# Patient Record
Sex: FEMALE | Race: WHITE | Employment: OTHER
[De-identification: names, ages, dates, MRNs, and addresses within clinical notes are randomized per-mention and may not be internally consistent; named-entity substitution may affect disease eponyms.]

---

## 2017-01-16 ENCOUNTER — TELEPHONE (OUTPATIENT)
Dept: INTERNAL MEDICINE | Facility: CLINIC | Age: 62
End: 2017-01-16

## 2017-03-27 ENCOUNTER — TELEPHONE (OUTPATIENT)
Dept: INTERNAL MEDICINE CLINIC | Age: 62
End: 2017-03-27

## 2017-03-28 RX ORDER — LIRAGLUTIDE 6 MG/ML
1.8 INJECTION SUBCUTANEOUS DAILY
Qty: 5 PEN | Refills: 3 | Status: SHIPPED | OUTPATIENT
Start: 2017-03-28 | End: 2017-07-18 | Stop reason: SDUPTHER

## 2017-03-28 RX ORDER — LIRAGLUTIDE 6 MG/ML
1.8 INJECTION SUBCUTANEOUS DAILY
Qty: 5 PEN | Refills: 3 | Status: SHIPPED | OUTPATIENT
Start: 2017-03-28 | End: 2017-03-28 | Stop reason: SDUPTHER

## 2017-04-28 ENCOUNTER — TELEPHONE (OUTPATIENT)
Dept: INTERNAL MEDICINE CLINIC | Age: 62
End: 2017-04-28

## 2017-04-28 DIAGNOSIS — E11.00 TYPE 2 DIABETES MELLITUS WITH HYPEROSMOLARITY WITHOUT COMA, UNSPECIFIED LONG TERM INSULIN USE STATUS: Primary | ICD-10-CM

## 2017-05-12 ENCOUNTER — HOSPITAL ENCOUNTER (OUTPATIENT)
Age: 62
Discharge: HOME | End: 2017-05-12
Payer: MEDICARE

## 2017-05-12 DIAGNOSIS — E11.00 TYPE 2 DIABETES MELLITUS WITH HYPEROSMOLARITY WITHOUT COMA, UNSPECIFIED LONG TERM INSULIN USE STATUS: ICD-10-CM

## 2017-05-12 LAB
ABSOLUTE EOS #: 0.1 K/UL (ref 0–0.4)
ABSOLUTE LYMPH #: 1.7 K/UL (ref 1–4.8)
ABSOLUTE MONO #: 0.4 K/UL (ref 0.1–1.2)
ALBUMIN SERPL-MCNC: 4.3 G/DL (ref 3.5–5.2)
ALBUMIN/GLOBULIN RATIO: 1.3 (ref 1–2.5)
ALP BLD-CCNC: 63 U/L (ref 35–104)
ALT SERPL-CCNC: 28 U/L (ref 5–33)
ANION GAP SERPL CALCULATED.3IONS-SCNC: 14 MMOL/L (ref 9–17)
AST SERPL-CCNC: 28 U/L
BASOPHILS # BLD: 0 %
BASOPHILS ABSOLUTE: 0 K/UL (ref 0–0.2)
BILIRUB SERPL-MCNC: 0.75 MG/DL (ref 0.3–1.2)
BUN BLDV-MCNC: 14 MG/DL (ref 8–23)
BUN/CREAT BLD: ABNORMAL (ref 9–20)
CALCIUM SERPL-MCNC: 9.8 MG/DL (ref 8.6–10.4)
CHLORIDE BLD-SCNC: 103 MMOL/L (ref 98–107)
CHOLESTEROL, FASTING: 169 MG/DL
CHOLESTEROL/HDL RATIO: 4.4
CO2: 26 MMOL/L (ref 20–31)
CREAT SERPL-MCNC: 0.72 MG/DL (ref 0.5–0.9)
CREATININE URINE: 213.7 MG/DL (ref 28–217)
DIFFERENTIAL TYPE: NORMAL
EOSINOPHILS RELATIVE PERCENT: 2 %
ESTIMATED AVERAGE GLUCOSE: 114 MG/DL
GFR AFRICAN AMERICAN: >60 ML/MIN
GFR NON-AFRICAN AMERICAN: >60 ML/MIN
GFR SERPL CREATININE-BSD FRML MDRD: ABNORMAL ML/MIN/{1.73_M2}
GFR SERPL CREATININE-BSD FRML MDRD: ABNORMAL ML/MIN/{1.73_M2}
GLUCOSE BLD-MCNC: 103 MG/DL (ref 70–99)
HBA1C MFR BLD: 5.6 % (ref 4–6)
HCT VFR BLD CALC: 43.5 % (ref 36–46)
HDLC SERPL-MCNC: 38 MG/DL
HEMOGLOBIN: 14.9 G/DL (ref 12–16)
LDL CHOLESTEROL: 114 MG/DL (ref 0–130)
LYMPHOCYTES # BLD: 29 %
MCH RBC QN AUTO: 29.4 PG (ref 26–34)
MCHC RBC AUTO-ENTMCNC: 34.3 G/DL (ref 31–37)
MCV RBC AUTO: 85.5 FL (ref 80–100)
MICROALBUMIN/CREAT 24H UR: <12 MG/L
MICROALBUMIN/CREAT UR-RTO: 6 MCG/MG CREAT
MONOCYTES # BLD: 7 %
PDW BLD-RTO: 13.3 % (ref 12.5–15.4)
PLATELET # BLD: 287 K/UL (ref 140–450)
PLATELET ESTIMATE: NORMAL
PMV BLD AUTO: 8.3 FL (ref 6–12)
POTASSIUM SERPL-SCNC: 5.5 MMOL/L (ref 3.7–5.3)
RBC # BLD: 5.09 M/UL (ref 4–5.2)
RBC # BLD: NORMAL 10*6/UL
SEG NEUTROPHILS: 62 %
SEGMENTED NEUTROPHILS ABSOLUTE COUNT: 3.7 K/UL (ref 1.8–7.7)
SODIUM BLD-SCNC: 143 MMOL/L (ref 135–144)
TOTAL PROTEIN: 7.5 G/DL (ref 6.4–8.3)
TRIGLYCERIDE, FASTING: 84 MG/DL
TSH SERPL DL<=0.05 MIU/L-ACNC: 1.25 MIU/L (ref 0.3–5)
VLDLC SERPL CALC-MCNC: ABNORMAL MG/DL (ref 1–30)
WBC # BLD: 6 K/UL (ref 3.5–11)
WBC # BLD: NORMAL 10*3/UL

## 2017-05-12 PROCEDURE — 84443 ASSAY THYROID STIM HORMONE: CPT

## 2017-05-12 PROCEDURE — 82570 ASSAY OF URINE CREATININE: CPT

## 2017-05-12 PROCEDURE — 80061 LIPID PANEL: CPT

## 2017-05-12 PROCEDURE — 85025 COMPLETE CBC W/AUTO DIFF WBC: CPT

## 2017-05-12 PROCEDURE — 82043 UR ALBUMIN QUANTITATIVE: CPT

## 2017-05-12 PROCEDURE — 83036 HEMOGLOBIN GLYCOSYLATED A1C: CPT

## 2017-05-12 PROCEDURE — 80053 COMPREHEN METABOLIC PANEL: CPT

## 2017-05-12 PROCEDURE — 36415 COLL VENOUS BLD VENIPUNCTURE: CPT

## 2017-05-24 ENCOUNTER — OFFICE VISIT (OUTPATIENT)
Dept: INTERNAL MEDICINE CLINIC | Age: 62
End: 2017-05-24
Payer: MEDICARE

## 2017-05-24 VITALS
HEIGHT: 65 IN | RESPIRATION RATE: 14 BRPM | SYSTOLIC BLOOD PRESSURE: 122 MMHG | BODY MASS INDEX: 26.33 KG/M2 | DIASTOLIC BLOOD PRESSURE: 70 MMHG | WEIGHT: 158 LBS

## 2017-05-24 DIAGNOSIS — K21.9 GASTROESOPHAGEAL REFLUX DISEASE WITHOUT ESOPHAGITIS: Primary | ICD-10-CM

## 2017-05-24 DIAGNOSIS — E11.9 TYPE 2 DIABETES MELLITUS WITHOUT COMPLICATION, WITHOUT LONG-TERM CURRENT USE OF INSULIN (HCC): ICD-10-CM

## 2017-05-24 PROCEDURE — 99214 OFFICE O/P EST MOD 30 MIN: CPT | Performed by: INTERNAL MEDICINE

## 2017-05-24 ASSESSMENT — ENCOUNTER SYMPTOMS
NAUSEA: 0
TROUBLE SWALLOWING: 0
EYE DISCHARGE: 0
EYE PAIN: 0
VOMITING: 0
COLOR CHANGE: 0
SHORTNESS OF BREATH: 0
COUGH: 0
BLURRED VISION: 0
DIARRHEA: 0
VISUAL CHANGE: 0
BACK PAIN: 0
ABDOMINAL PAIN: 0
CHEST TIGHTNESS: 0
SORE THROAT: 0
CONSTIPATION: 0

## 2017-07-18 ENCOUNTER — OFFICE VISIT (OUTPATIENT)
Dept: PRIMARY CARE CLINIC | Age: 62
End: 2017-07-18
Payer: MEDICARE

## 2017-07-18 VITALS
RESPIRATION RATE: 14 BRPM | HEIGHT: 64 IN | DIASTOLIC BLOOD PRESSURE: 68 MMHG | BODY MASS INDEX: 25.61 KG/M2 | SYSTOLIC BLOOD PRESSURE: 120 MMHG | HEART RATE: 54 BPM | WEIGHT: 150 LBS

## 2017-07-18 DIAGNOSIS — I25.10 CORONARY ARTERY DISEASE INVOLVING NATIVE CORONARY ARTERY OF NATIVE HEART WITHOUT ANGINA PECTORIS: ICD-10-CM

## 2017-07-18 DIAGNOSIS — J02.0 ACUTE STREPTOCOCCAL PHARYNGITIS: ICD-10-CM

## 2017-07-18 DIAGNOSIS — R05.9 COUGH: ICD-10-CM

## 2017-07-18 DIAGNOSIS — M79.7 FIBROMYALGIA: ICD-10-CM

## 2017-07-18 DIAGNOSIS — I48.91 ATRIAL FIBRILLATION, UNSPECIFIED TYPE (HCC): ICD-10-CM

## 2017-07-18 DIAGNOSIS — I10 ESSENTIAL HYPERTENSION: ICD-10-CM

## 2017-07-18 DIAGNOSIS — K21.9 GASTROESOPHAGEAL REFLUX DISEASE WITHOUT ESOPHAGITIS: ICD-10-CM

## 2017-07-18 DIAGNOSIS — E11.9 TYPE 2 DIABETES MELLITUS WITHOUT COMPLICATION, WITHOUT LONG-TERM CURRENT USE OF INSULIN (HCC): Primary | ICD-10-CM

## 2017-07-18 DIAGNOSIS — J44.9 CHRONIC OBSTRUCTIVE PULMONARY DISEASE, UNSPECIFIED COPD TYPE (HCC): ICD-10-CM

## 2017-07-18 DIAGNOSIS — E03.9 ACQUIRED HYPOTHYROIDISM: ICD-10-CM

## 2017-07-18 PROCEDURE — 99214 OFFICE O/P EST MOD 30 MIN: CPT | Performed by: INTERNAL MEDICINE

## 2017-07-18 RX ORDER — LIRAGLUTIDE 6 MG/ML
1.8 INJECTION SUBCUTANEOUS DAILY
Qty: 5 PEN | Refills: 3 | Status: SHIPPED | OUTPATIENT
Start: 2017-07-18 | End: 2018-03-19 | Stop reason: ALTCHOICE

## 2017-07-18 ASSESSMENT — ENCOUNTER SYMPTOMS
VOMITING: 0
BLOOD IN STOOL: 0
ABDOMINAL PAIN: 0
SHORTNESS OF BREATH: 0
COUGH: 0
HEARTBURN: 0
NAUSEA: 0

## 2017-08-23 ENCOUNTER — HOSPITAL ENCOUNTER (OUTPATIENT)
Age: 62
Discharge: HOME | End: 2017-08-23
Payer: MEDICARE

## 2017-08-23 ENCOUNTER — HOSPITAL ENCOUNTER (OUTPATIENT)
Dept: GENERAL RADIOLOGY | Age: 62
Discharge: HOME | End: 2017-08-23
Payer: MEDICARE

## 2017-08-23 ENCOUNTER — PATIENT MESSAGE (OUTPATIENT)
Dept: PRIMARY CARE CLINIC | Age: 62
End: 2017-08-23

## 2017-08-23 DIAGNOSIS — R05.9 COUGH: ICD-10-CM

## 2017-08-23 PROCEDURE — 71020 XR CHEST STANDARD TWO VW: CPT

## 2017-09-20 DIAGNOSIS — Z12.11 SCREEN FOR COLON CANCER: Primary | ICD-10-CM

## 2017-09-20 LAB
CONTROL: PRESENT
HEMOCCULT STL QL: NEGATIVE

## 2017-09-20 PROCEDURE — 82274 ASSAY TEST FOR BLOOD FECAL: CPT | Performed by: INTERNAL MEDICINE

## 2017-09-21 ENCOUNTER — TELEPHONE (OUTPATIENT)
Dept: PRIMARY CARE CLINIC | Age: 62
End: 2017-09-21

## 2018-02-23 ENCOUNTER — HOSPITAL ENCOUNTER (EMERGENCY)
Age: 63
Discharge: HOME | End: 2018-02-23
Attending: EMERGENCY MEDICINE
Payer: MEDICARE

## 2018-02-23 VITALS
BODY MASS INDEX: 23.3 KG/M2 | TEMPERATURE: 98.1 F | WEIGHT: 145 LBS | OXYGEN SATURATION: 98 % | HEART RATE: 80 BPM | SYSTOLIC BLOOD PRESSURE: 151 MMHG | HEIGHT: 66 IN | DIASTOLIC BLOOD PRESSURE: 88 MMHG | RESPIRATION RATE: 18 BRPM

## 2018-02-23 DIAGNOSIS — J06.9 ACUTE UPPER RESPIRATORY INFECTION: ICD-10-CM

## 2018-02-23 DIAGNOSIS — J02.0 STREPTOCOCCAL SORE THROAT: Primary | ICD-10-CM

## 2018-02-23 PROCEDURE — 6370000000 HC RX 637 (ALT 250 FOR IP): Performed by: EMERGENCY MEDICINE

## 2018-02-23 PROCEDURE — 99283 EMERGENCY DEPT VISIT LOW MDM: CPT

## 2018-02-23 RX ORDER — BENZONATATE 100 MG/1
100 CAPSULE ORAL 3 TIMES DAILY PRN
Qty: 30 CAPSULE | Refills: 0 | Status: SHIPPED | OUTPATIENT
Start: 2018-02-23 | End: 2018-03-02

## 2018-02-23 RX ORDER — AZITHROMYCIN 250 MG/1
500 TABLET, FILM COATED ORAL ONCE
Status: COMPLETED | OUTPATIENT
Start: 2018-02-23 | End: 2018-02-23

## 2018-02-23 RX ORDER — AZITHROMYCIN 250 MG/1
TABLET, FILM COATED ORAL
Qty: 1 PACKET | Refills: 0 | Status: SHIPPED | OUTPATIENT
Start: 2018-02-23 | End: 2018-03-05

## 2018-02-23 RX ADMIN — AZITHROMYCIN 500 MG: 250 TABLET, FILM COATED ORAL at 21:11

## 2018-02-23 ASSESSMENT — ENCOUNTER SYMPTOMS
COUGH: 1
RHINORRHEA: 1
EYE DISCHARGE: 0
SORE THROAT: 1
BACK PAIN: 0
NAUSEA: 0
DIARRHEA: 0
VOMITING: 0
SHORTNESS OF BREATH: 0
EYE REDNESS: 0
EYE PAIN: 0
ABDOMINAL PAIN: 0

## 2018-02-23 ASSESSMENT — PAIN SCALES - GENERAL: PAINLEVEL_OUTOF10: 7

## 2018-02-23 ASSESSMENT — PAIN DESCRIPTION - LOCATION: LOCATION: NECK;THROAT

## 2018-02-24 NOTE — ED PROVIDER NOTES
[facility] ED  [address] [place] [address]  Phone: [phone]  Fax: [phone]    eMERGENCY dEPARTMENT eNCOUnter          Pt Name: Hortensia Gamboa  MRN: [id number]  [place] 1955  Date of evaluation: 2/23/2018      CHIEF COMPLAINT       Chief Complaint   Patient presents with    Cough     going on all wee long    Pharyngitis       HISTORY OF PRESENT ILLNESS              Hortensia Gamboa is a 57 y.o. female who presents with a prescription for infection symptoms and sore throat for the past 4 days. Has some generalized body aches as well. Denies any fevers. No significant headache. Also reports some laryngitis. Nothing seems to make the symptoms better or worse. States her pharyngitis is the worst of her symptoms. Denies other concerns. REVIEW OF SYSTEMS         Review of Systems   Constitutional: Negative for chills, fatigue and fever. HENT: Positive for congestion, rhinorrhea and sore throat. Negative for ear discharge and ear pain. Eyes: Negative for pain, discharge and redness. Respiratory: Positive for cough. Negative for shortness of breath. Cardiovascular: Negative for chest pain. Gastrointestinal: Negative for abdominal pain, diarrhea, nausea and vomiting. Genitourinary: Negative for difficulty urinating. Musculoskeletal: Negative for back pain and neck pain. Skin: Negative for rash. Neurological: Negative for weakness and headaches. All other systems reviewed and are negative. PAST MEDICAL HISTORY    has a past medical history of DM2 (diabetes mellitus, type 2) ([address] Fibromyalgia; GERD (gastroesophageal reflux disease); and HTN (hypertension). SURGICAL HISTORY      has a past surgical history that includes Carpal tunnel release; Ulnar tunnel release (Bilateral); and Tubal ligation.     CURRENT MEDICATIONS       Previous Medications    INSULIN PEN NEEDLE 31G X 8 MM MISC    1 each by Does not apply route daily    VICTOZA 18 present. No meningeal signs. Cardiovascular: Normal rate, regular rhythm, normal heart sounds and intact distal pulses. Pulmonary/Chest: Effort normal and breath sounds normal. No stridor. No respiratory distress. She has no wheezes. She has no rales. Abdominal: Soft. Bowel sounds are normal. There is no tenderness. Neurological: She is alert. GCS score is 15. Skin: Skin is warm and dry. No rash noted. She is not diaphoretic. No erythema. Vitals reviewed. DIFFERENTIAL DIAGNOSIS/ MDM:     Plan at this time will be to put the patient on azithromycin. Lungs are clear bilaterally. Clinically she is nontoxic-appearing. No evidence of meningitis. Advised to follow-up with the PCP or return right away if worse or for new or concerning symptoms. She is comfortable with this plan. I have reviewed the disposition diagnosis with the patient and or their family/guardian. I have answered their questions and given discharge instructions. They voiced understanding of these instructions and did not have any further questions or complaints. DIAGNOSTIC RESULTS     EKG: All EKG's are interpreted by the Emergency Department Physician who either signs or Co-signs this chart in the absence of a cardiologist.        RADIOLOGY:   I directly visualized the following  images and reviewed the radiologist interpretations:  No orders to display       No results found. LABS:  No results found for this visit on 02/23/18. EMERGENCY DEPARTMENT COURSE:     The patient was given the following medications:  Orders Placed This Encounter   Medications    azithromycin (ZITHROMAX) 250 MG tablet     Sig: Take 2 tablets (500 mg) on Day 1, followed by 1 tablet (250 mg) once daily on Days 2 through 5.      Dispense:  1 packet     Refill:  0    azithromycin (ZITHROMAX) tablet 500 mg    benzonatate (TESSALON PERLES) 100 MG capsule     Sig: Take 1 capsule by mouth 3 times daily as needed for Cough     Dispense:  30

## 2018-03-15 ENCOUNTER — HOSPITAL ENCOUNTER (OUTPATIENT)
Age: 63
Discharge: HOME | End: 2018-03-15
Payer: MEDICARE

## 2018-03-15 ENCOUNTER — TELEPHONE (OUTPATIENT)
Dept: PRIMARY CARE CLINIC | Age: 63
End: 2018-03-15

## 2018-03-15 DIAGNOSIS — Z13.29 SCREENING FOR THYROID DISORDER: ICD-10-CM

## 2018-03-15 DIAGNOSIS — E11.9 TYPE 2 DIABETES MELLITUS WITHOUT COMPLICATION, WITHOUT LONG-TERM CURRENT USE OF INSULIN (HCC): ICD-10-CM

## 2018-03-15 DIAGNOSIS — I10 ESSENTIAL HYPERTENSION: ICD-10-CM

## 2018-03-15 DIAGNOSIS — Z13.0 SCREENING FOR DEFICIENCY ANEMIA: ICD-10-CM

## 2018-03-15 DIAGNOSIS — Z13.220 SCREENING FOR HYPERLIPIDEMIA: ICD-10-CM

## 2018-03-15 DIAGNOSIS — E11.9 TYPE 2 DIABETES MELLITUS WITHOUT COMPLICATION, WITHOUT LONG-TERM CURRENT USE OF INSULIN (HCC): Primary | ICD-10-CM

## 2018-03-15 LAB
ALBUMIN SERPL-MCNC: 4.2 G/DL (ref 3.5–5.2)
ALBUMIN/GLOBULIN RATIO: 1.3 (ref 1–2.5)
ALP BLD-CCNC: 73 U/L (ref 35–104)
ALT SERPL-CCNC: 15 U/L (ref 5–33)
ANION GAP SERPL CALCULATED.3IONS-SCNC: 14 MMOL/L (ref 9–17)
AST SERPL-CCNC: 26 U/L
BILIRUB SERPL-MCNC: 0.78 MG/DL (ref 0.3–1.2)
BUN BLDV-MCNC: 14 MG/DL (ref 8–23)
BUN/CREAT BLD: ABNORMAL (ref 9–20)
CALCIUM SERPL-MCNC: 9.7 MG/DL (ref 8.6–10.4)
CHLORIDE BLD-SCNC: 102 MMOL/L (ref 98–107)
CHOLESTEROL/HDL RATIO: 3.9
CHOLESTEROL: 176 MG/DL
CO2: 26 MMOL/L (ref 20–31)
CREAT SERPL-MCNC: 0.62 MG/DL (ref 0.5–0.9)
ESTIMATED AVERAGE GLUCOSE: 128 MG/DL
GFR AFRICAN AMERICAN: >60 ML/MIN
GFR NON-AFRICAN AMERICAN: >60 ML/MIN
GFR SERPL CREATININE-BSD FRML MDRD: ABNORMAL ML/MIN/{1.73_M2}
GFR SERPL CREATININE-BSD FRML MDRD: ABNORMAL ML/MIN/{1.73_M2}
GLUCOSE BLD-MCNC: 100 MG/DL (ref 70–99)
HBA1C MFR BLD: 6.1 % (ref 4–6)
HCT VFR BLD CALC: 43.9 % (ref 36.3–47.1)
HDLC SERPL-MCNC: 45 MG/DL
HEMOGLOBIN: 14.4 G/DL (ref 11.9–15.1)
LDL CHOLESTEROL: 101 MG/DL (ref 0–130)
MCH RBC QN AUTO: 29.3 PG (ref 25.2–33.5)
MCHC RBC AUTO-ENTMCNC: 32.8 G/DL (ref 28.4–34.8)
MCV RBC AUTO: 89.4 FL (ref 82.6–102.9)
NRBC AUTOMATED: 0 PER 100 WBC
PDW BLD-RTO: 12.4 % (ref 11.8–14.4)
PLATELET # BLD: 290 K/UL (ref 138–453)
PMV BLD AUTO: 10.6 FL (ref 8.1–13.5)
POTASSIUM SERPL-SCNC: 5 MMOL/L (ref 3.7–5.3)
RBC # BLD: 4.91 M/UL (ref 3.95–5.11)
SODIUM BLD-SCNC: 142 MMOL/L (ref 135–144)
TOTAL PROTEIN: 7.4 G/DL (ref 6.4–8.3)
TRIGL SERPL-MCNC: 148 MG/DL
TSH SERPL DL<=0.05 MIU/L-ACNC: 1.62 MIU/L (ref 0.3–5)
VLDLC SERPL CALC-MCNC: NORMAL MG/DL (ref 1–30)
WBC # BLD: 5.9 K/UL (ref 3.5–11.3)

## 2018-03-15 PROCEDURE — 80053 COMPREHEN METABOLIC PANEL: CPT

## 2018-03-15 PROCEDURE — 84443 ASSAY THYROID STIM HORMONE: CPT

## 2018-03-15 PROCEDURE — 83036 HEMOGLOBIN GLYCOSYLATED A1C: CPT

## 2018-03-15 PROCEDURE — 80061 LIPID PANEL: CPT

## 2018-03-15 PROCEDURE — 36415 COLL VENOUS BLD VENIPUNCTURE: CPT

## 2018-03-15 PROCEDURE — 85027 COMPLETE CBC AUTOMATED: CPT

## 2018-03-19 ENCOUNTER — OFFICE VISIT (OUTPATIENT)
Dept: PRIMARY CARE CLINIC | Age: 63
End: 2018-03-19
Payer: MEDICARE

## 2018-03-19 VITALS
DIASTOLIC BLOOD PRESSURE: 74 MMHG | SYSTOLIC BLOOD PRESSURE: 122 MMHG | OXYGEN SATURATION: 97 % | HEIGHT: 66 IN | HEART RATE: 70 BPM | BODY MASS INDEX: 24.85 KG/M2 | RESPIRATION RATE: 17 BRPM | WEIGHT: 154.6 LBS

## 2018-03-19 DIAGNOSIS — R49.9 CHANGE IN VOICE: ICD-10-CM

## 2018-03-19 DIAGNOSIS — I10 ESSENTIAL HYPERTENSION: ICD-10-CM

## 2018-03-19 DIAGNOSIS — Z12.39 SCREENING FOR BREAST CANCER: ICD-10-CM

## 2018-03-19 DIAGNOSIS — E11.9 TYPE 2 DIABETES MELLITUS WITHOUT COMPLICATION, WITHOUT LONG-TERM CURRENT USE OF INSULIN (HCC): Primary | ICD-10-CM

## 2018-03-19 PROCEDURE — G8510 SCR DEP NEG, NO PLAN REQD: HCPCS | Performed by: NURSE PRACTITIONER

## 2018-03-19 PROCEDURE — 99214 OFFICE O/P EST MOD 30 MIN: CPT | Performed by: NURSE PRACTITIONER

## 2018-03-19 ASSESSMENT — PATIENT HEALTH QUESTIONNAIRE - PHQ9
SUM OF ALL RESPONSES TO PHQ QUESTIONS 1-9: 0
SUM OF ALL RESPONSES TO PHQ QUESTIONS 1-9: 0
1. LITTLE INTEREST OR PLEASURE IN DOING THINGS: 0
1. LITTLE INTEREST OR PLEASURE IN DOING THINGS: 0
2. FEELING DOWN, DEPRESSED OR HOPELESS: 0
2. FEELING DOWN, DEPRESSED OR HOPELESS: 0
SUM OF ALL RESPONSES TO PHQ9 QUESTIONS 1 & 2: 0
SUM OF ALL RESPONSES TO PHQ9 QUESTIONS 1 & 2: 0

## 2018-03-19 ASSESSMENT — ENCOUNTER SYMPTOMS
BLURRED VISION: 0
SHORTNESS OF BREATH: 0
ABDOMINAL PAIN: 0
BACK PAIN: 0
ORTHOPNEA: 0
VOICE CHANGE: 1
COUGH: 0

## 2018-03-19 NOTE — PROGRESS NOTES
3/19/2019) for annual exam.     1. DM- Stable. Hga1c 6.1. Not taking any meds. Continues to monitor blood sugar at home daily to monitor. 2. HTN- Stable. No meds. Continue to monitor at home. Follow up in one year for recheck. 3. Change in voice- Rx given for referral to ENT. Follow up as needed. Of the 25 minute duration appointment visit, Sotero ROTH-BC spent at least 50% of the face-to-face time in counseling, explanation of diagnosis, planning of further management, and answering all questions. Orders Placed This Encounter   Procedures    WES DIGITAL SCREEN W CAD BILATERAL     Standing Status:   Future     Standing Expiration Date:   3/19/2019     Order Specific Question:   Reason for exam:     Answer:   screening    External Referral To Ent     Referral Priority:   Routine     Referral Type:   Consult for Advice and Opinion     Referral Reason:   Specialty Services Required     Requested Specialty:   Otolaryngology     Number of Visits Requested:   1     Orders Placed This Encounter   Medications    glucose blood VI test strips (ASCENSIA AUTODISC VI;ONE TOUCH ULTRA TEST VI) strip     Si each by Other route daily Use with associated glucose meter. Dispense:  100 strip     Refill:  11       Patient given educational materials - see patient instructions. Discussed use, benefit, and side effects of prescribed medications. All patient questions answered. Pt voiced understanding. Reviewed health maintenance. Instructed to continue current medications, diet and exercise. Patient agreed with treatment plan. Follow up as directed.       Electronically signed by Jim Emanuel CNP on 3/19/2018 at 11:38 AM

## 2018-03-20 ENCOUNTER — HOSPITAL ENCOUNTER (OUTPATIENT)
Dept: MAMMOGRAPHY | Age: 63
Discharge: HOME | End: 2018-03-22
Payer: MEDICARE

## 2018-03-20 ENCOUNTER — TELEPHONE (OUTPATIENT)
Dept: PRIMARY CARE CLINIC | Age: 63
End: 2018-03-20

## 2018-03-20 DIAGNOSIS — Z12.39 SCREENING FOR BREAST CANCER: ICD-10-CM

## 2018-03-20 PROCEDURE — 77067 SCR MAMMO BI INCL CAD: CPT

## 2018-10-31 ENCOUNTER — OFFICE VISIT (OUTPATIENT)
Dept: PRIMARY CARE CLINIC | Age: 63
End: 2018-10-31
Payer: MEDICARE

## 2018-10-31 VITALS
BODY MASS INDEX: 25.82 KG/M2 | WEIGHT: 160 LBS | HEART RATE: 71 BPM | DIASTOLIC BLOOD PRESSURE: 76 MMHG | OXYGEN SATURATION: 96 % | SYSTOLIC BLOOD PRESSURE: 116 MMHG

## 2018-10-31 DIAGNOSIS — M72.0 DUPUYTREN CONTRACTURE: Primary | ICD-10-CM

## 2018-10-31 DIAGNOSIS — L91.8 SKIN TAG: ICD-10-CM

## 2018-10-31 PROCEDURE — 99214 OFFICE O/P EST MOD 30 MIN: CPT | Performed by: NURSE PRACTITIONER

## 2018-10-31 ASSESSMENT — ENCOUNTER SYMPTOMS
ABDOMINAL PAIN: 0
ROS SKIN COMMENTS: SKIN TAGS
SHORTNESS OF BREATH: 0
BACK PAIN: 0
COUGH: 0

## 2018-10-31 NOTE — PROGRESS NOTES
Prescriptions   Medication Sig Dispense Refill    glucose blood VI test strips (ASCENSIA AUTODISC VI;ONE TOUCH ULTRA TEST VI) strip 1 each by Other route daily Use with associated glucose meter. 100 strip 11     No current facility-administered medications for this visit. Allergies   Allergen Reactions    Codeine Nausea Only       Health Maintenance   Topic Date Due    Hepatitis C screen  1955    HIV screen  08/11/1968    DTaP/Tdap/Td vaccine (1 - Tdap) 08/11/1972    Shingles Vaccine (1 of 2 - 2 Dose Series) 08/11/2003    Low dose CT lung screening  08/11/2008    Cervical cancer screen  06/02/2017    Diabetic foot exam  07/18/2017    Diabetic retinal exam  01/03/2018    Diabetic microalbuminuria test  05/12/2018    Pneumococcal low/med risk (1 of 2 - PCV13) 08/11/2018    Flu vaccine (1) 09/01/2018    Colon Cancer Screen FIT/FOBT  09/19/2018    DEXA (modify frequency per FRAX score)  08/11/2018    A1C test (Diabetic or Prediabetic)  03/15/2019    Lipid screen  03/15/2019    Breast cancer screen  03/20/2020       Subjective:      Review of Systems   Constitutional: Negative for chills, fatigue and fever. HENT: Negative for congestion. Respiratory: Negative for cough and shortness of breath. Cardiovascular: Negative for chest pain and palpitations. Gastrointestinal: Negative for abdominal pain. Genitourinary: Negative for dysuria. Musculoskeletal: Negative for back pain. Contracture of 4th digit bilateral hands   Skin:        Skin tags   Neurological: Negative for dizziness and numbness. Psychiatric/Behavioral: Negative for self-injury, sleep disturbance and suicidal ideas. The patient is not nervous/anxious. Objective:     Physical Exam   Constitutional: She is oriented to person, place, and time. She appears well-developed and well-nourished. HENT:   Head: Normocephalic and atraumatic. Eyes: Pupils are equal, round, and reactive to light.    Neck: Normal health maintenance. Instructedto continue current medications, diet and exercise. Patient agreed with treatmentplan. Follow up as directed.      Electronicallysigned by DEISY Mittal CNP on 10/31/2018 at 2:54 PM

## 2018-11-14 ENCOUNTER — OFFICE VISIT (OUTPATIENT)
Dept: ORTHOPEDIC SURGERY | Age: 63
End: 2018-11-14
Payer: MEDICARE

## 2018-11-14 VITALS — BODY MASS INDEX: 25.71 KG/M2 | WEIGHT: 160 LBS | HEIGHT: 66 IN

## 2018-11-14 DIAGNOSIS — M20.011 ACQUIRED MALLET DEFORMITY OF RIGHT LITTLE FINGER: ICD-10-CM

## 2018-11-14 DIAGNOSIS — Z01.818 PRE-OP TESTING: ICD-10-CM

## 2018-11-14 DIAGNOSIS — M65.342 TRIGGER RING FINGER OF LEFT HAND: Primary | ICD-10-CM

## 2018-11-14 DIAGNOSIS — M65.341 TRIGGER RING FINGER OF RIGHT HAND: ICD-10-CM

## 2018-11-14 PROCEDURE — 99204 OFFICE O/P NEW MOD 45 MIN: CPT | Performed by: ORTHOPAEDIC SURGERY

## 2018-11-14 ASSESSMENT — ENCOUNTER SYMPTOMS
COUGH: 0
ABDOMINAL PAIN: 0
CHEST TIGHTNESS: 0
VOMITING: 0
APNEA: 0

## 2018-11-14 NOTE — PROGRESS NOTES
[facility]  [address] [place] [id number]  Dept: [phone]    Ambulatory Orthopedic Consult      CHIEF COMPLAINT:    Chief Complaint   Patient presents with    Dupytrens syndrome     bilateral       HISTORY OF PRESENT ILLNESS:      The patient is a 70 y.o. female who is being seen at the request of  DEISY Peng CNP for consultation and evaluation of bilateral ring finger pain. The patient states that for the last 3 months her bilateral ring fingers have been locking up and causing her pain. The patient states that she did not have any injuries to either hand. The patient is right hand dominant. The patient states that her pain is worse than at night and her fingers will lock up over night and she will have to force her fingers straight. The patient has finger deformity to her right pinky finger than happened approximately 7 years ago. The patient remembers getting her finger caught in her dogs collar but didn't think anything of it until years later when it never changed. The patient states that her finger bothers her when exercising as well as doing yoga. Past Medical History:    Past Medical History:   Diagnosis Date    [facility]2 (di[facility] mellitus, type 2) [facility]     Fibromyalgia     GERD (gastroesophageal reflux disease)     HTN (hypertension)        Past Surgical History:    Past Surgical History:   Procedure Laterality Date    CARPAL TUNNEL RELEASE      TUBAL LIGATION      ULNAR TUNNEL RELEASE Bilateral        Current Medications:   Current Outpatient Prescriptions   Medication Sig Dispense Refill    glucose blood VI test strips (ASCENSIA AUTODISC VI;ONE TOUCH ULTRA TEST VI) strip 1 each by Other route daily Use with associated glucose meter. 100 strip 11     No current facility-administered medications for this visit.         Allergies:    Codeine    Social History:   Social History     Social Palpable nodule in the region of the A1 pulley of the Bilateral ring fingers is noted. Active triggering of the ring fingers are noted. Motor, sensory, vascular examination to the Bilateral upper extremity is noted to be intact without deficits. Patient has full range of motion of the other fingers of the Bilateral hand without tenderness and has full range of motion of the wrist.    Right small finger flexion deformity at the DIP joint is appreciated with mild swelling and mild tenderness over the DIP. Finger can be passively extended to full extension. Active extension of the small finger at the DIP cannot be accomplished. No instability of the right small finger is noted. Motor, sensory, vascular examination the right upper extremity is grossly intact without focal deficits. Patient is able to make a tight composite fist on the right without difficulty. Radiology:     No results found. ASSESSMENT:     1. Trigger ring finger of left hand    2. Trigger ring finger of right hand    3. Pre-op testing    4. 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 The treatment options may include oral anti-inflammatories, bracing, injections, advanced imaging, activity modification, physical therapy and/or surgical intervention. Discussed risks/benifits of surgery with the patient today in the office. Discussed with the patient that after surgery you cannot lift anything heavier than a pen/fork. As for the mallet finger I discussed with the patient the surgical options for repairing the mallet finger. Would like to get an x-ray of the finger to confirm mallet deformity. Discussed sending the patient to a hand therapist for bracing. The patient would like to proceed with trigger finger release for the right ring finger. The patient will follow up in the office 2 weeks post operatively.   We discussed that the

## 2018-11-20 ENCOUNTER — TELEPHONE (OUTPATIENT)
Dept: PRIMARY CARE CLINIC | Age: 63
End: 2018-11-20

## 2018-11-20 DIAGNOSIS — Z12.11 SCREEN FOR COLON CANCER: Primary | ICD-10-CM

## 2018-11-20 DIAGNOSIS — Z13.820 SCREENING FOR OSTEOPOROSIS: ICD-10-CM

## 2018-11-21 NOTE — TELEPHONE ENCOUNTER
Labs were all done 3/2018, WNL  Repeat in 3/2019    Ordered dexa and fit  Will need to discuss/document information for low dose ct, I am assuming lung?     Thanks

## 2018-11-27 ENCOUNTER — HOSPITAL ENCOUNTER (OUTPATIENT)
Dept: PREADMISSION TESTING | Facility: CLINIC | Age: 63
Discharge: HOME | End: 2018-12-01
Payer: MEDICARE

## 2018-11-27 VITALS
DIASTOLIC BLOOD PRESSURE: 84 MMHG | HEART RATE: 59 BPM | BODY MASS INDEX: 24.91 KG/M2 | RESPIRATION RATE: 16 BRPM | WEIGHT: 155 LBS | HEIGHT: 66 IN | SYSTOLIC BLOOD PRESSURE: 135 MMHG | OXYGEN SATURATION: 95 %

## 2018-11-27 LAB
-: NORMAL
ALBUMIN SERPL-MCNC: 4.3 G/DL (ref 3.5–5.2)
ALBUMIN/GLOBULIN RATIO: 1.4 (ref 1–2.5)
ALP BLD-CCNC: 70 U/L (ref 35–104)
ALT SERPL-CCNC: 25 U/L (ref 5–33)
AMORPHOUS: NORMAL
ANION GAP SERPL CALCULATED.3IONS-SCNC: 11 MMOL/L (ref 9–17)
AST SERPL-CCNC: 28 U/L
BACTERIA: NORMAL
BILIRUB SERPL-MCNC: 0.64 MG/DL (ref 0.3–1.2)
BILIRUBIN URINE: NEGATIVE
BUN BLDV-MCNC: 11 MG/DL (ref 8–23)
BUN/CREAT BLD: ABNORMAL (ref 9–20)
CALCIUM SERPL-MCNC: 9.4 MG/DL (ref 8.6–10.4)
CASTS UA: NORMAL /LPF (ref 0–8)
CHLORIDE BLD-SCNC: 102 MMOL/L (ref 98–107)
CO2: 28 MMOL/L (ref 20–31)
COLOR: YELLOW
COMMENT UA: ABNORMAL
CREAT SERPL-MCNC: 0.64 MG/DL (ref 0.5–0.9)
CRYSTALS, UA: NORMAL /HPF
EKG ATRIAL RATE: 61 BPM
EKG P AXIS: 32 DEGREES
EKG P-R INTERVAL: 140 MS
EKG Q-T INTERVAL: 410 MS
EKG QRS DURATION: 66 MS
EKG QTC CALCULATION (BAZETT): 412 MS
EKG R AXIS: 36 DEGREES
EKG T AXIS: 38 DEGREES
EKG VENTRICULAR RATE: 61 BPM
EPITHELIAL CELLS UA: NORMAL /HPF (ref 0–5)
GFR AFRICAN AMERICAN: >60 ML/MIN
GFR NON-AFRICAN AMERICAN: >60 ML/MIN
GFR SERPL CREATININE-BSD FRML MDRD: ABNORMAL ML/MIN/{1.73_M2}
GFR SERPL CREATININE-BSD FRML MDRD: ABNORMAL ML/MIN/{1.73_M2}
GLUCOSE BLD-MCNC: 147 MG/DL (ref 70–99)
GLUCOSE URINE: NEGATIVE
HCT VFR BLD CALC: 43.4 % (ref 36.3–47.1)
HEMOGLOBIN: 14.2 G/DL (ref 11.9–15.1)
KETONES, URINE: NEGATIVE
LEUKOCYTE ESTERASE, URINE: ABNORMAL
MCH RBC QN AUTO: 29.8 PG (ref 25.2–33.5)
MCHC RBC AUTO-ENTMCNC: 32.7 G/DL (ref 28.4–34.8)
MCV RBC AUTO: 91 FL (ref 82.6–102.9)
MUCUS: NORMAL
NITRITE, URINE: NEGATIVE
NRBC AUTOMATED: 0 PER 100 WBC
OTHER OBSERVATIONS UA: NORMAL
PDW BLD-RTO: 12.4 % (ref 11.8–14.4)
PH UA: 6 (ref 5–8)
PLATELET # BLD: 263 K/UL (ref 138–453)
PMV BLD AUTO: 10.3 FL (ref 8.1–13.5)
POTASSIUM SERPL-SCNC: 4.5 MMOL/L (ref 3.7–5.3)
PROTEIN UA: NEGATIVE
RBC # BLD: 4.77 M/UL (ref 3.95–5.11)
RBC UA: NORMAL /HPF (ref 0–4)
RENAL EPITHELIAL, UA: NORMAL /HPF
SODIUM BLD-SCNC: 141 MMOL/L (ref 135–144)
SPECIFIC GRAVITY UA: 1.02 (ref 1–1.03)
TOTAL PROTEIN: 7.4 G/DL (ref 6.4–8.3)
TRICHOMONAS: NORMAL
TURBIDITY: CLEAR
URINE HGB: NEGATIVE
UROBILINOGEN, URINE: NORMAL
WBC # BLD: 6.9 K/UL (ref 3.5–11.3)
WBC UA: NORMAL /HPF (ref 0–5)
YEAST: NORMAL

## 2018-11-27 PROCEDURE — 81001 URINALYSIS AUTO W/SCOPE: CPT

## 2018-11-27 PROCEDURE — 80053 COMPREHEN METABOLIC PANEL: CPT

## 2018-11-27 PROCEDURE — 85027 COMPLETE CBC AUTOMATED: CPT

## 2018-11-27 PROCEDURE — 93005 ELECTROCARDIOGRAM TRACING: CPT

## 2018-11-27 NOTE — PROGRESS NOTES
DAY OF SURGERY/PROCEDURE  GUIDELINES    As a patient at the [facility] you can expect quality medical and nursing care that is centered on your individual needs. It is our goal to make your surgical experience as comfortable and excellent as possible.  ________________________________________________________________________    The following instructions are general guidelines, if any information on this sheet is different from what your doctor has instructed you to do, please follow your doctor's instructions. · Please arrive on time or your procedure may be rescheduled  · Enter through front entrance of the building. Surgery Center will be located to your right. · Upon arrival you will be taken to the pre-operative area to get ready for surgery, your family will stay in the waiting room and visit with you once you are ready for surgery. Due to special limitations please limit visitation to 1-2 members of your family at a time. When it is time for surgery your family will return to the waiting room. · You will be given a specific time when you will NOT be able to eat, drink, smoke, suck or chew ANYTHING (no water, gum, mints, cigarettes, cigars, pipes, snuff, chewing tobacco, etc.) or your surgery may be canceled. This will occur during your PAT appointment or by phone 1-2 days before surgery  · Take a shower or bath on the morning of your surgery/procedure (Hibiclens if directed)  · Brush your teeth, but do not swallow any water  · IN CASE OF ILLNESS - If you have a cold or flu symptoms (high fever, runny nose, sore throat, cough, etc.) rash, nausea, vomiting, loose stools, and/or recent contact with someone who has a contagious disease (chick pox, measles, etc.) please call your doctor before coming to the surgery center  · Take a small sip of water with heart, blood pressure, and/or seizure medication the morning of surgery.  (DO NOT take blood pressure medications that contain a

## 2018-11-29 ENCOUNTER — HOSPITAL ENCOUNTER (OUTPATIENT)
Dept: GENERAL RADIOLOGY | Age: 63
Discharge: HOME | End: 2018-12-01
Payer: MEDICARE

## 2018-11-29 ENCOUNTER — HOSPITAL ENCOUNTER (OUTPATIENT)
Age: 63
Discharge: HOME | End: 2018-12-01
Payer: MEDICARE

## 2018-11-29 DIAGNOSIS — Z01.818 PRE-OP TESTING: ICD-10-CM

## 2018-11-29 PROCEDURE — 71046 X-RAY EXAM CHEST 2 VIEWS: CPT

## 2018-12-07 ENCOUNTER — ANESTHESIA (OUTPATIENT)
Dept: OPERATING ROOM | Facility: CLINIC | Age: 63
End: 2018-12-07
Payer: MEDICARE

## 2018-12-07 ENCOUNTER — HOSPITAL ENCOUNTER (OUTPATIENT)
Facility: CLINIC | Age: 63
Setting detail: OUTPATIENT SURGERY
Discharge: HOME | End: 2018-12-07
Attending: ORTHOPAEDIC SURGERY | Admitting: ORTHOPAEDIC SURGERY
Payer: MEDICARE

## 2018-12-07 ENCOUNTER — ANESTHESIA EVENT (OUTPATIENT)
Dept: OPERATING ROOM | Facility: CLINIC | Age: 63
End: 2018-12-07
Payer: MEDICARE

## 2018-12-07 VITALS
DIASTOLIC BLOOD PRESSURE: 92 MMHG | OXYGEN SATURATION: 95 % | HEART RATE: 63 BPM | WEIGHT: 155 LBS | TEMPERATURE: 97.5 F | BODY MASS INDEX: 24.91 KG/M2 | RESPIRATION RATE: 20 BRPM | HEIGHT: 66 IN | SYSTOLIC BLOOD PRESSURE: 151 MMHG

## 2018-12-07 VITALS — DIASTOLIC BLOOD PRESSURE: 70 MMHG | SYSTOLIC BLOOD PRESSURE: 111 MMHG | OXYGEN SATURATION: 95 %

## 2018-12-07 DIAGNOSIS — G89.18 POST-OP PAIN: Primary | ICD-10-CM

## 2018-12-07 LAB — GLUCOSE BLD-MCNC: 88 MG/DL (ref 65–105)

## 2018-12-07 PROCEDURE — 3600000002 HC SURGERY LEVEL 2 BASE: Performed by: ORTHOPAEDIC SURGERY

## 2018-12-07 PROCEDURE — 6360000002 HC RX W HCPCS: Performed by: ANESTHESIOLOGY

## 2018-12-07 PROCEDURE — 3700000001 HC ADD 15 MINUTES (ANESTHESIA): Performed by: ORTHOPAEDIC SURGERY

## 2018-12-07 PROCEDURE — 6360000002 HC RX W HCPCS: Performed by: ORTHOPAEDIC SURGERY

## 2018-12-07 PROCEDURE — 2500000003 HC RX 250 WO HCPCS: Performed by: NURSE ANESTHETIST, CERTIFIED REGISTERED

## 2018-12-07 PROCEDURE — 2580000003 HC RX 258: Performed by: ANESTHESIOLOGY

## 2018-12-07 PROCEDURE — 3700000000 HC ANESTHESIA ATTENDED CARE: Performed by: ORTHOPAEDIC SURGERY

## 2018-12-07 PROCEDURE — 7100000010 HC PHASE II RECOVERY - FIRST 15 MIN: Performed by: ORTHOPAEDIC SURGERY

## 2018-12-07 PROCEDURE — 82947 ASSAY GLUCOSE BLOOD QUANT: CPT

## 2018-12-07 PROCEDURE — 7100000011 HC PHASE II RECOVERY - ADDTL 15 MIN: Performed by: ORTHOPAEDIC SURGERY

## 2018-12-07 PROCEDURE — 26055 INCISE FINGER TENDON SHEATH: CPT | Performed by: ORTHOPAEDIC SURGERY

## 2018-12-07 PROCEDURE — 3600000012 HC SURGERY LEVEL 2 ADDTL 15MIN: Performed by: ORTHOPAEDIC SURGERY

## 2018-12-07 PROCEDURE — 6360000002 HC RX W HCPCS: Performed by: NURSE ANESTHETIST, CERTIFIED REGISTERED

## 2018-12-07 RX ORDER — FENTANYL CITRATE 50 UG/ML
25 INJECTION, SOLUTION INTRAMUSCULAR; INTRAVENOUS EVERY 5 MIN PRN
Status: DISCONTINUED | OUTPATIENT
Start: 2018-12-07 | End: 2018-12-07 | Stop reason: HOSPADM

## 2018-12-07 RX ORDER — LIDOCAINE HYDROCHLORIDE 10 MG/ML
INJECTION, SOLUTION EPIDURAL; INFILTRATION; INTRACAUDAL; PERINEURAL PRN
Status: DISCONTINUED | OUTPATIENT
Start: 2018-12-07 | End: 2018-12-07 | Stop reason: SDUPTHER

## 2018-12-07 RX ORDER — PROPOFOL 10 MG/ML
INJECTION, EMULSION INTRAVENOUS PRN
Status: DISCONTINUED | OUTPATIENT
Start: 2018-12-07 | End: 2018-12-07 | Stop reason: SDUPTHER

## 2018-12-07 RX ORDER — ONDANSETRON 2 MG/ML
4 INJECTION INTRAMUSCULAR; INTRAVENOUS
Status: DISCONTINUED | OUTPATIENT
Start: 2018-12-07 | End: 2018-12-07 | Stop reason: HOSPADM

## 2018-12-07 RX ORDER — SODIUM CHLORIDE, SODIUM LACTATE, POTASSIUM CHLORIDE, CALCIUM CHLORIDE 600; 310; 30; 20 MG/100ML; MG/100ML; MG/100ML; MG/100ML
INJECTION, SOLUTION INTRAVENOUS CONTINUOUS
Status: DISCONTINUED | OUTPATIENT
Start: 2018-12-07 | End: 2018-12-07 | Stop reason: HOSPADM

## 2018-12-07 RX ORDER — HYDROCODONE BITARTRATE AND ACETAMINOPHEN 5; 325 MG/1; MG/1
1 TABLET ORAL EVERY 4 HOURS PRN
Qty: 18 TABLET | Refills: 0 | Status: SHIPPED | OUTPATIENT
Start: 2018-12-07 | End: 2018-12-10

## 2018-12-07 RX ORDER — PROPOFOL 10 MG/ML
INJECTION, EMULSION INTRAVENOUS CONTINUOUS PRN
Status: DISCONTINUED | OUTPATIENT
Start: 2018-12-07 | End: 2018-12-07 | Stop reason: SDUPTHER

## 2018-12-07 RX ORDER — MIDAZOLAM HYDROCHLORIDE 1 MG/ML
1 INJECTION INTRAMUSCULAR; INTRAVENOUS ONCE
Status: COMPLETED | OUTPATIENT
Start: 2018-12-07 | End: 2018-12-07

## 2018-12-07 RX ADMIN — LIDOCAINE HYDROCHLORIDE 50 MG: 10 INJECTION, SOLUTION EPIDURAL; INFILTRATION; INTRACAUDAL at 14:37

## 2018-12-07 RX ADMIN — PROPOFOL 80 MG: 10 INJECTION, EMULSION INTRAVENOUS at 14:37

## 2018-12-07 RX ADMIN — PROPOFOL 100 MCG/KG/MIN: 10 INJECTION, EMULSION INTRAVENOUS at 14:37

## 2018-12-07 RX ADMIN — SODIUM CHLORIDE, POTASSIUM CHLORIDE, SODIUM LACTATE AND CALCIUM CHLORIDE: 600; 310; 30; 20 INJECTION, SOLUTION INTRAVENOUS at 13:15

## 2018-12-07 RX ADMIN — Medication 2 G: at 14:36

## 2018-12-07 RX ADMIN — MIDAZOLAM HYDROCHLORIDE 1 MG: 1 INJECTION, SOLUTION INTRAMUSCULAR; INTRAVENOUS at 13:25

## 2018-12-07 ASSESSMENT — PULMONARY FUNCTION TESTS
PIF_VALUE: 1
PIF_VALUE: 1
PIF_VALUE: 0
PIF_VALUE: 0
PIF_VALUE: 1
PIF_VALUE: 0
PIF_VALUE: 0
PIF_VALUE: 1
PIF_VALUE: 0
PIF_VALUE: 1
PIF_VALUE: 0
PIF_VALUE: 1
PIF_VALUE: 0
PIF_VALUE: 1
PIF_VALUE: 0
PIF_VALUE: 0
PIF_VALUE: 1

## 2018-12-07 ASSESSMENT — PAIN - FUNCTIONAL ASSESSMENT: PAIN_FUNCTIONAL_ASSESSMENT: 0-10

## 2018-12-07 ASSESSMENT — ENCOUNTER SYMPTOMS
STRIDOR: 0
SHORTNESS OF BREATH: 0

## 2018-12-07 NOTE — ANESTHESIA PRE PROCEDURE
Department of Anesthesiology  Preprocedure Note       Name:  Jose Killian   Age:  70 y.o.  :  1955                                          MRN:  [id number]         Date:  2018      Surgeon: Pauly Menendez):  Skip Garg DO    Procedure: RIGHT RING FINGER TRIGGER RELEASE (N/A )    Medications prior to admission:   Prior to Admission medications    Medication Sig Start Date End Date Taking? Authorizing Provider   glucose blood VI test strips (ASCENSIA AUTODISC VI;ONE TOUCH ULTRA TEST VI) strip 1 each by Other route daily Use with associated glucose meter. 3/19/18   Joseph Hurley, APRN - CNP       Current medications:    No current facility-administered medications for this encounter. Allergies:     Allergies   Allergen Reactions    Codeine Nausea Only       Problem List:    Patient Active Problem List   Diagnosis Code    Type 2 diabetes mellitus without complication ([facility]) E78.8    Fibromyalgia M79.7    HTN (hypertension) I10    GERD (gastroesophageal reflux disease) K21.9    Low back pain M54.5    Dizziness R42    DM2 (diabetes mellitus, type 2) ([address] E11.9       Past Medical History:        Diagnosis Date    Diabetes mellitus ([address]     Fibromyalgia     GERD (gastroesophageal reflux disease)        Past Surgical History:        Procedure Laterality Date    CARPAL TUNNEL RELEASE      TUBAL LIGATION      ULNAR TUNNEL RELEASE Bilateral        Social History:    Social History   Substance Use Topics    Smoking status: Former Smoker     Packs/day: 1.50     Years: 30.00     Types: Cigarettes     Quit date: 2005    Smokeless tobacco: Never Used    Alcohol use No                                Counseling given: Not Answered      Vital Signs (Current):   Vitals:    18 1301   BP: (!) 146/93   Pulse: 62   Resp: 12   Temp: 97.2 °F (36.2 °C)   TempSrc: Temporal   SpO2: 95%   Weight: 155 lb (70.3 kg)   Height: 5' 6\" (1.676 m)                                              BP

## 2018-12-19 ENCOUNTER — OFFICE VISIT (OUTPATIENT)
Dept: ORTHOPEDIC SURGERY | Age: 63
End: 2018-12-19

## 2018-12-19 VITALS — HEIGHT: 66 IN | BODY MASS INDEX: 24.91 KG/M2 | WEIGHT: 154.98 LBS

## 2018-12-19 DIAGNOSIS — M65.341 TRIGGER RING FINGER OF RIGHT HAND: Primary | ICD-10-CM

## 2018-12-19 PROCEDURE — 99024 POSTOP FOLLOW-UP VISIT: CPT | Performed by: ORTHOPAEDIC SURGERY

## 2018-12-19 ASSESSMENT — ENCOUNTER SYMPTOMS
DIARRHEA: 0
NAUSEA: 0
COUGH: 0
CONSTIPATION: 0

## 2019-01-15 ENCOUNTER — TELEPHONE (OUTPATIENT)
Dept: PRIMARY CARE CLINIC | Age: 64
End: 2019-01-15

## 2019-03-14 ENCOUNTER — TELEPHONE (OUTPATIENT)
Dept: PRIMARY CARE CLINIC | Age: 64
End: 2019-03-14

## 2019-03-15 ENCOUNTER — TELEPHONE (OUTPATIENT)
Dept: PRIMARY CARE CLINIC | Age: 64
End: 2019-03-15

## 2019-03-15 DIAGNOSIS — R79.9 ABNORMAL FINDING OF BLOOD CHEMISTRY: ICD-10-CM

## 2019-03-15 DIAGNOSIS — Z13.1 SCREENING FOR DIABETES MELLITUS: ICD-10-CM

## 2019-03-15 DIAGNOSIS — Z13.29 SCREENING FOR THYROID DISORDER: ICD-10-CM

## 2019-03-15 DIAGNOSIS — Z13.220 SCREENING FOR LIPID DISORDERS: ICD-10-CM

## 2019-03-15 DIAGNOSIS — Z13.0 SCREENING FOR DEFICIENCY ANEMIA: Primary | ICD-10-CM

## 2019-03-19 ENCOUNTER — HOSPITAL ENCOUNTER (OUTPATIENT)
Age: 64
Discharge: HOME | End: 2019-03-19
Payer: MEDICARE

## 2019-03-19 DIAGNOSIS — Z13.220 SCREENING FOR LIPID DISORDERS: ICD-10-CM

## 2019-03-19 DIAGNOSIS — Z13.1 SCREENING FOR DIABETES MELLITUS: ICD-10-CM

## 2019-03-19 DIAGNOSIS — Z01.818 PRE-OP TESTING: ICD-10-CM

## 2019-03-19 DIAGNOSIS — R79.9 ABNORMAL FINDING OF BLOOD CHEMISTRY: ICD-10-CM

## 2019-03-19 DIAGNOSIS — Z13.29 SCREENING FOR THYROID DISORDER: ICD-10-CM

## 2019-03-19 LAB
ALBUMIN SERPL-MCNC: 4.3 G/DL (ref 3.5–5.2)
ALBUMIN/GLOBULIN RATIO: 1.5 (ref 1–2.5)
ALP BLD-CCNC: 73 U/L (ref 35–104)
ALT SERPL-CCNC: 18 U/L (ref 5–33)
ANION GAP SERPL CALCULATED.3IONS-SCNC: 17 MMOL/L (ref 9–17)
AST SERPL-CCNC: 22 U/L
BILIRUB SERPL-MCNC: 0.45 MG/DL (ref 0.3–1.2)
BUN BLDV-MCNC: 13 MG/DL (ref 8–23)
BUN/CREAT BLD: ABNORMAL (ref 9–20)
CALCIUM SERPL-MCNC: 9.5 MG/DL (ref 8.6–10.4)
CHLORIDE BLD-SCNC: 105 MMOL/L (ref 98–107)
CHOLESTEROL/HDL RATIO: 3.7
CHOLESTEROL: 143 MG/DL
CO2: 23 MMOL/L (ref 20–31)
CREAT SERPL-MCNC: 0.59 MG/DL (ref 0.5–0.9)
ESTIMATED AVERAGE GLUCOSE: 146 MG/DL
GFR AFRICAN AMERICAN: >60 ML/MIN
GFR NON-AFRICAN AMERICAN: >60 ML/MIN
GFR SERPL CREATININE-BSD FRML MDRD: ABNORMAL ML/MIN/{1.73_M2}
GFR SERPL CREATININE-BSD FRML MDRD: ABNORMAL ML/MIN/{1.73_M2}
GLUCOSE BLD-MCNC: 127 MG/DL (ref 70–99)
HBA1C MFR BLD: 6.7 % (ref 4–6)
HCT VFR BLD CALC: 41.9 % (ref 36.3–47.1)
HDLC SERPL-MCNC: 39 MG/DL
HEMOGLOBIN: 13.8 G/DL (ref 11.9–15.1)
LDL CHOLESTEROL: 81 MG/DL (ref 0–130)
MCH RBC QN AUTO: 29.8 PG (ref 25.2–33.5)
MCHC RBC AUTO-ENTMCNC: 32.9 G/DL (ref 28.4–34.8)
MCV RBC AUTO: 90.5 FL (ref 82.6–102.9)
NRBC AUTOMATED: 0 PER 100 WBC
PDW BLD-RTO: 12.3 % (ref 11.8–14.4)
PLATELET # BLD: 276 K/UL (ref 138–453)
PMV BLD AUTO: 10.7 FL (ref 8.1–13.5)
POTASSIUM SERPL-SCNC: 4.8 MMOL/L (ref 3.7–5.3)
RBC # BLD: 4.63 M/UL (ref 3.95–5.11)
SODIUM BLD-SCNC: 145 MMOL/L (ref 135–144)
TOTAL PROTEIN: 7.1 G/DL (ref 6.4–8.3)
TRIGL SERPL-MCNC: 117 MG/DL
TSH SERPL DL<=0.05 MIU/L-ACNC: 1.27 MIU/L (ref 0.3–5)
VLDLC SERPL CALC-MCNC: ABNORMAL MG/DL (ref 1–30)
WBC # BLD: 6.1 K/UL (ref 3.5–11.3)

## 2019-03-19 PROCEDURE — 84443 ASSAY THYROID STIM HORMONE: CPT

## 2019-03-19 PROCEDURE — 80053 COMPREHEN METABOLIC PANEL: CPT

## 2019-03-19 PROCEDURE — 83036 HEMOGLOBIN GLYCOSYLATED A1C: CPT

## 2019-03-19 PROCEDURE — 36415 COLL VENOUS BLD VENIPUNCTURE: CPT

## 2019-03-19 PROCEDURE — 80061 LIPID PANEL: CPT

## 2019-03-19 PROCEDURE — 85027 COMPLETE CBC AUTOMATED: CPT

## 2019-03-20 ENCOUNTER — OFFICE VISIT (OUTPATIENT)
Dept: PRIMARY CARE CLINIC | Age: 64
End: 2019-03-20
Payer: MEDICARE

## 2019-03-20 VITALS
HEART RATE: 69 BPM | DIASTOLIC BLOOD PRESSURE: 74 MMHG | OXYGEN SATURATION: 96 % | HEIGHT: 65 IN | BODY MASS INDEX: 27.02 KG/M2 | SYSTOLIC BLOOD PRESSURE: 128 MMHG | WEIGHT: 162.2 LBS | RESPIRATION RATE: 16 BRPM

## 2019-03-20 DIAGNOSIS — E11.9 TYPE 2 DIABETES MELLITUS WITHOUT COMPLICATION, WITHOUT LONG-TERM CURRENT USE OF INSULIN (HCC): ICD-10-CM

## 2019-03-20 DIAGNOSIS — Z13.0 SCREENING FOR DEFICIENCY ANEMIA: ICD-10-CM

## 2019-03-20 DIAGNOSIS — Z00.00 ENCOUNTER FOR GENERAL ADULT MEDICAL EXAMINATION W/O ABNORMAL FINDINGS: Primary | ICD-10-CM

## 2019-03-20 DIAGNOSIS — Z78.0 POST-MENOPAUSAL: ICD-10-CM

## 2019-03-20 DIAGNOSIS — Z12.11 SCREEN FOR COLON CANCER: ICD-10-CM

## 2019-03-20 DIAGNOSIS — Z13.220 SCREENING FOR HYPERLIPIDEMIA: ICD-10-CM

## 2019-03-20 DIAGNOSIS — Z13.29 SCREENING FOR THYROID DISORDER: ICD-10-CM

## 2019-03-20 PROCEDURE — 99397 PER PM REEVAL EST PAT 65+ YR: CPT | Performed by: NURSE PRACTITIONER

## 2019-03-20 RX ORDER — LANOLIN ALCOHOL/MO/W.PET/CERES
1000 CREAM (GRAM) TOPICAL DAILY
COMMUNITY
End: 2021-07-29

## 2019-03-20 ASSESSMENT — PATIENT HEALTH QUESTIONNAIRE - PHQ9
SUM OF ALL RESPONSES TO PHQ QUESTIONS 1-9: 0
2. FEELING DOWN, DEPRESSED OR HOPELESS: 0
SUM OF ALL RESPONSES TO PHQ QUESTIONS 1-9: 0
SUM OF ALL RESPONSES TO PHQ9 QUESTIONS 1 & 2: 0
1. LITTLE INTEREST OR PLEASURE IN DOING THINGS: 0

## 2019-03-20 ASSESSMENT — ENCOUNTER SYMPTOMS
SHORTNESS OF BREATH: 0
ABDOMINAL PAIN: 0
COUGH: 0
BACK PAIN: 0

## 2019-07-31 ENCOUNTER — TELEPHONE (OUTPATIENT)
Dept: PRIMARY CARE CLINIC | Age: 64
End: 2019-07-31

## 2019-10-18 ENCOUNTER — TELEPHONE (OUTPATIENT)
Dept: PRIMARY CARE CLINIC | Age: 64
End: 2019-10-18

## 2020-04-28 ENCOUNTER — TELEPHONE (OUTPATIENT)
Dept: ADMINISTRATIVE | Age: 65
End: 2020-04-28

## 2020-04-29 ENCOUNTER — HOSPITAL ENCOUNTER (OUTPATIENT)
Age: 65
Setting detail: SPECIMEN
Discharge: HOME | End: 2020-04-29
Payer: MEDICARE

## 2020-04-29 LAB
ALBUMIN SERPL-MCNC: 4.2 G/DL (ref 3.5–5.2)
ALBUMIN/GLOBULIN RATIO: 1.3 (ref 1–2.5)
ALP BLD-CCNC: 83 U/L (ref 35–104)
ALT SERPL-CCNC: 20 U/L (ref 5–33)
ANION GAP SERPL CALCULATED.3IONS-SCNC: 12 MMOL/L (ref 9–17)
AST SERPL-CCNC: 22 U/L
BILIRUB SERPL-MCNC: 0.6 MG/DL (ref 0.3–1.2)
BUN BLDV-MCNC: 12 MG/DL (ref 8–23)
BUN/CREAT BLD: ABNORMAL (ref 9–20)
CALCIUM SERPL-MCNC: 9.1 MG/DL (ref 8.6–10.4)
CHLORIDE BLD-SCNC: 103 MMOL/L (ref 98–107)
CHOLESTEROL/HDL RATIO: 4.3
CHOLESTEROL: 155 MG/DL
CO2: 25 MMOL/L (ref 20–31)
CREAT SERPL-MCNC: 0.68 MG/DL (ref 0.5–0.9)
ESTIMATED AVERAGE GLUCOSE: 194 MG/DL
GFR AFRICAN AMERICAN: >60 ML/MIN
GFR NON-AFRICAN AMERICAN: >60 ML/MIN
GFR SERPL CREATININE-BSD FRML MDRD: ABNORMAL ML/MIN/{1.73_M2}
GFR SERPL CREATININE-BSD FRML MDRD: ABNORMAL ML/MIN/{1.73_M2}
GLUCOSE BLD-MCNC: 155 MG/DL (ref 70–99)
HBA1C MFR BLD: 8.4 % (ref 4–6)
HCT VFR BLD CALC: 43.6 % (ref 36.3–47.1)
HDLC SERPL-MCNC: 36 MG/DL
HEMOGLOBIN: 14.2 G/DL (ref 11.9–15.1)
LDL CHOLESTEROL: 94 MG/DL (ref 0–130)
MCH RBC QN AUTO: 29.4 PG (ref 25.2–33.5)
MCHC RBC AUTO-ENTMCNC: 32.6 G/DL (ref 28.4–34.8)
MCV RBC AUTO: 90.3 FL (ref 82.6–102.9)
NRBC AUTOMATED: 0 PER 100 WBC
PDW BLD-RTO: 12.2 % (ref 11.8–14.4)
PLATELET # BLD: 276 K/UL (ref 138–453)
PMV BLD AUTO: 10.6 FL (ref 8.1–13.5)
POTASSIUM SERPL-SCNC: 5 MMOL/L (ref 3.7–5.3)
RBC # BLD: 4.83 M/UL (ref 3.95–5.11)
SODIUM BLD-SCNC: 140 MMOL/L (ref 135–144)
TOTAL PROTEIN: 7.4 G/DL (ref 6.4–8.3)
TRIGL SERPL-MCNC: 123 MG/DL
TSH SERPL DL<=0.05 MIU/L-ACNC: 1.05 MIU/L (ref 0.3–5)
VLDLC SERPL CALC-MCNC: ABNORMAL MG/DL (ref 1–30)
WBC # BLD: 5.7 K/UL (ref 3.5–11.3)

## 2020-04-30 ENCOUNTER — TELEMEDICINE (OUTPATIENT)
Dept: PRIMARY CARE CLINIC | Age: 65
End: 2020-04-30
Payer: MEDICARE

## 2020-04-30 PROBLEM — I48.91 ATRIAL FIBRILLATION (HCC): Status: ACTIVE | Noted: 2020-04-30

## 2020-04-30 PROBLEM — J44.9 CHRONIC OBSTRUCTIVE PULMONARY DISEASE (HCC): Status: ACTIVE | Noted: 2020-04-30

## 2020-04-30 PROCEDURE — 3052F HG A1C>EQUAL 8.0%<EQUAL 9.0%: CPT | Performed by: NURSE PRACTITIONER

## 2020-04-30 PROCEDURE — 99214 OFFICE O/P EST MOD 30 MIN: CPT | Performed by: NURSE PRACTITIONER

## 2020-04-30 RX ORDER — SEMAGLUTIDE 1.34 MG/ML
0.5 INJECTION, SOLUTION SUBCUTANEOUS WEEKLY
Qty: 3 PEN | Refills: 0 | Status: SHIPPED | OUTPATIENT
Start: 2020-04-30 | End: 2020-06-26

## 2020-04-30 ASSESSMENT — ENCOUNTER SYMPTOMS
BACK PAIN: 0
SHORTNESS OF BREATH: 0
ABDOMINAL PAIN: 0
COUGH: 0

## 2020-04-30 NOTE — PROGRESS NOTES
understanding. Reviewed health maintenance. Instructedto continue current medications, diet and exercise. Patient agreed with treatmentplan. Follow up as directed.      Electronicallysigned by Governor DEISY Talavera CNP on 4/30/2020 at 3:30 PM

## 2020-05-04 ENCOUNTER — TELEPHONE (OUTPATIENT)
Dept: PRIMARY CARE CLINIC | Age: 65
End: 2020-05-04

## 2020-06-26 RX ORDER — SEMAGLUTIDE 1.34 MG/ML
INJECTION, SOLUTION SUBCUTANEOUS
Qty: 4.5 ML | Refills: 6 | Status: SHIPPED | OUTPATIENT
Start: 2020-06-26 | End: 2021-07-29

## 2020-06-26 NOTE — TELEPHONE ENCOUNTER
Last OV 04/30/2020    Health Maintenance   Topic Date Due    Hepatitis C screen  1955    DTaP/Tdap/Td vaccine (1 - Tdap) 08/11/1972    Shingles Vaccine (1 of 2) 08/11/2003    DEXA (modify frequency per FRAX score)  08/11/2008    Diabetic foot exam  07/18/2017    Diabetic retinal exam  01/03/2018    Diabetic microalbuminuria test  05/12/2018    Pneumococcal 65+ years Vaccine (1 of 1 - PPSV23) 08/11/2018    Colon Cancer Screen FIT/FOBT  09/19/2018    Annual Wellness Visit (AWV)  05/29/2019    Breast cancer screen  03/20/2020    Flu vaccine (Season Ended) 09/01/2020    A1C test (Diabetic or Prediabetic)  04/29/2021    Lipid screen  04/29/2021    Hepatitis A vaccine  Aged Out    Hib vaccine  Aged Out    Meningococcal (ACWY) vaccine  Aged Out             (applicable per patient's age: Cancer Screenings, Depression Screening, Fall Risk Screening, Immunizations)    Hemoglobin A1C (%)   Date Value   04/29/2020 8.4 (H)   03/19/2019 6.7 (H)   03/15/2018 6.1 (H)     Microalb/Crt. Ratio (mcg/mg creat)   Date Value   05/12/2017 6     LDL Cholesterol (mg/dL)   Date Value   04/29/2020 94     AST (U/L)   Date Value   04/29/2020 22     ALT (U/L)   Date Value   04/29/2020 20     BUN (mg/dL)   Date Value   04/29/2020 12      (goal A1C is < 7)   (goal LDL is <100) need 30-50% reduction from baseline     BP Readings from Last 3 Encounters:   03/20/19 128/74   12/07/18 (!) 151/92   12/07/18 111/70    (goal /80)      All Future Testing planned in [facility]:  Lab Frequency Next Occurrence       Next Visit Date:  No future appointments.          Patient Active Problem List:     Type 2 diabetes mellitus without complication (HCC)     Fibromyalgia     HTN (hypertension)     GERD (gastroesophageal reflux disease)     Low back pain     Dizziness     DM2 (diabetes mellitus, type 2) (HCC)     Trigger finger, right ring finger     Chronic obstructive pulmonary disease (HCC)     Atrial fibrillation ([address]

## 2020-11-03 PROBLEM — E11.9 DM2 (DIABETES MELLITUS, TYPE 2) (HCC): Status: RESOLVED | Noted: 2020-11-03 | Resolved: 2020-11-03

## 2021-02-03 ENCOUNTER — OFFICE VISIT (OUTPATIENT)
Dept: PRIMARY CARE CLINIC | Age: 66
End: 2021-02-03
Payer: MEDICARE

## 2021-02-03 VITALS
HEIGHT: 70 IN | SYSTOLIC BLOOD PRESSURE: 126 MMHG | HEART RATE: 77 BPM | RESPIRATION RATE: 13 BRPM | BODY MASS INDEX: 24.25 KG/M2 | DIASTOLIC BLOOD PRESSURE: 80 MMHG | OXYGEN SATURATION: 96 % | WEIGHT: 169.4 LBS

## 2021-02-03 DIAGNOSIS — Z12.31 VISIT FOR SCREENING MAMMOGRAM: ICD-10-CM

## 2021-02-03 DIAGNOSIS — E78.5 HYPERLIPIDEMIA, UNSPECIFIED HYPERLIPIDEMIA TYPE: ICD-10-CM

## 2021-02-03 DIAGNOSIS — Z00.00 ENCOUNTER FOR GENERAL ADULT MEDICAL EXAMINATION W/O ABNORMAL FINDINGS: Primary | ICD-10-CM

## 2021-02-03 DIAGNOSIS — E11.9 TYPE 2 DIABETES MELLITUS WITHOUT COMPLICATION, WITHOUT LONG-TERM CURRENT USE OF INSULIN (HCC): ICD-10-CM

## 2021-02-03 DIAGNOSIS — Z78.0 POST-MENOPAUSAL: ICD-10-CM

## 2021-02-03 DIAGNOSIS — Z13.0 SCREENING FOR DEFICIENCY ANEMIA: ICD-10-CM

## 2021-02-03 DIAGNOSIS — Z13.29 SCREENING FOR THYROID DISORDER: ICD-10-CM

## 2021-02-03 PROBLEM — R49.0 HOARSENESS: Status: ACTIVE | Noted: 2018-04-03

## 2021-02-03 PROBLEM — H93.13 BILATERAL TINNITUS: Status: ACTIVE | Noted: 2018-04-03

## 2021-02-03 LAB — HBA1C MFR BLD: 6.2 %

## 2021-02-03 PROCEDURE — 99397 PER PM REEVAL EST PAT 65+ YR: CPT | Performed by: NURSE PRACTITIONER

## 2021-02-03 PROCEDURE — 83036 HEMOGLOBIN GLYCOSYLATED A1C: CPT | Performed by: NURSE PRACTITIONER

## 2021-02-03 RX ORDER — SEMAGLUTIDE 1.34 MG/ML
1 INJECTION, SOLUTION SUBCUTANEOUS WEEKLY
Qty: 5 PEN | Refills: 3 | Status: SHIPPED | OUTPATIENT
Start: 2021-02-03 | End: 2021-07-29

## 2021-02-03 ASSESSMENT — ENCOUNTER SYMPTOMS
COUGH: 0
BACK PAIN: 0
SHORTNESS OF BREATH: 0
ABDOMINAL PAIN: 0

## 2021-02-03 ASSESSMENT — PATIENT HEALTH QUESTIONNAIRE - PHQ9
1. LITTLE INTEREST OR PLEASURE IN DOING THINGS: 0
SUM OF ALL RESPONSES TO PHQ QUESTIONS 1-9: 0

## 2021-02-03 NOTE — PROGRESS NOTES
CARPAL TUNNEL RELEASE      FINGER TRIGGER RELEASE Right 12/07/2018    ring    FINGER TRIGGER RELEASE N/A 12/7/2018    RIGHT RING FINGER TRIGGER RELEASE performed by Deangelo Irvin DO at [address] [place] [address]      ULNAR TUNNEL RELEASE Bilateral        Family History   Problem Relation Age of Onset    Diabetes Maternal Grandmother     Diabetes Maternal Grandfather           Social History     Tobacco Use    Smoking status: Former Smoker     Packs/day: 1.50     Years: 30.00     Pack years: 45.00     Types: Cigarettes     Quit date: 5/23/2005     Years since quitting: 15.7    Smokeless tobacco: Never Used   Substance Use Topics    Alcohol use: No      Current Outpatient Medications   Medication Sig Dispense Refill    Semaglutide, 1 MG/DOSE, (OZEMPIC, 1 MG/DOSE,) 2 MG/1.5ML SOPN Inject 1 mg into the skin once a week 5 pen 3    OZEMPIC, 0.25 OR 0.5 MG/DOSE, 2 MG/1.5ML SOPN INJECT 0.5 MG UNDER THE SKIN ONCE A WEEK 4.5 mL 6    Handicap Placard MISC by Does not apply route Expires 4/2025 1 each 0    vitamin B-12 (CYANOCOBALAMIN) 1000 MCG tablet Take 1,000 mcg by mouth daily      glucose blood VI test strips (ASCENSIA AUTODISC VI;ONE TOUCH ULTRA TEST VI) strip 1 each by Other route daily Use with associated glucose meter. 100 strip 11     No current facility-administered medications for this visit.       Allergies   Allergen Reactions    Codeine Nausea Only       Health Maintenance   Topic Date Due    DEXA (modify frequency per FRAX score)  08/11/2008    Colon Cancer Screen FIT/FOBT  09/19/2018    Annual Wellness Visit (AWV)  05/29/2019    Breast cancer screen  03/20/2020    DTaP/Tdap/Td vaccine (1 - Tdap) 02/24/2021 (Originally 8/11/1972)    Pneumococcal 65+ years Vaccine (1 of 1 - PPSV23) 08/31/2021 (Originally 8/11/2018)    Flu vaccine (1) 02/03/2022 (Originally 9/1/2020)    Shingles Vaccine (1 of 2) 02/03/2022 (Originally 8/11/2003)    Diabetic retinal exam  02/13/2022 (Originally 1/3/2018)    Lipid screen  04/29/2021    Diabetic microalbuminuria test  11/13/2021    Diabetic foot exam  02/03/2022    A1C test (Diabetic or Prediabetic)  02/03/2022    Hepatitis A vaccine  Aged Out    Hib vaccine  Aged Out    Meningococcal (ACWY) vaccine  Aged Out    Hepatitis C screen  Discontinued       Subjective:      Review of Systems   Constitutional: Negative for chills, fatigue and fever. HENT: Negative for congestion. Eyes: Negative for visual disturbance. Respiratory: Negative for cough and shortness of breath. Cardiovascular: Negative for chest pain and palpitations. Gastrointestinal: Negative for abdominal pain. Genitourinary: Negative for dysuria. Musculoskeletal: Negative for back pain. Neurological: Negative for dizziness, numbness and headaches. Psychiatric/Behavioral: Negative for self-injury, sleep disturbance and suicidal ideas. The patient is not nervous/anxious. Objective:     Physical Exam  Vitals signs and nursing note reviewed. Constitutional:       Appearance: She is well-developed. HENT:      Head: Normocephalic and atraumatic. Eyes:      Pupils: Pupils are equal, round, and reactive to light. Neck:      Musculoskeletal: Normal range of motion and neck supple. Cardiovascular:      Rate and Rhythm: Normal rate and regular rhythm. Heart sounds: Normal heart sounds. Pulmonary:      Effort: Pulmonary effort is normal.      Breath sounds: Normal breath sounds. Abdominal:      General: Bowel sounds are normal.      Palpations: Abdomen is soft. Tenderness: There is no abdominal tenderness. Musculoskeletal: Normal range of motion. Skin:     General: Skin is warm and dry. Neurological:      Mental Status: She is alert and oriented to person, place, and time. Psychiatric:         Behavior: Behavior normal.         Thought Content:  Thought content normal.         Judgment: Judgment normal.       /80   Pulse 77 Resp 13   Ht 5' 9.8\" (1.773 m)   Wt 169 lb 6.4 oz (76.8 kg)   SpO2 96%   Breastfeeding No   BMI 24.45 kg/m²     Assessment:       Diagnosis Orders   1. Encounter for general adult medical examination w/o abnormal findings     2. Type 2 diabetes mellitus without complication, without long-term current use of insulin (HCC)  POCT glycosylated hemoglobin (Hb A1C)    Comprehensive Metabolic Panel     DIABETES FOOT EXAM   3. Visit for screening mammogram  WES DIGITAL SCREEN W OR WO CAD BILATERAL   4. Screening for deficiency anemia  CBC   5. Hyperlipidemia, unspecified hyperlipidemia type  Lipid Panel   6. Screening for thyroid disorder  TSH with Reflex   7. Post-menopausal  HM DEXA SCAN             Plan:      Return in about 1 year (around 2/3/2022) for AWV. 1. HM- Rx given for annual labs, mammogram and dexa. Continue diet/exercise. Increase ozempic to 1mg weekly. If no improvement with weight loss notify office and will discuss. Otherwise follow up in one year/as needed for AWV.     Orders Placed This Encounter   Procedures    WES DIGITAL SCREEN W OR WO CAD BILATERAL     Standing Status:   Future     Standing Expiration Date:   4/5/2022     DEXA SCAN     Standing Status:   Future     Standing Expiration Date:   8/3/2022    Comprehensive Metabolic Panel     Standing Status:   Future     Standing Expiration Date:   2/3/2022    CBC     Standing Status:   Future     Standing Expiration Date:   2/3/2022    Lipid Panel     Standing Status:   Future     Standing Expiration Date:   2/3/2022     Order Specific Question:   Is Patient Fasting?/# of Hours     Answer:   12    TSH with Reflex     Standing Status:   Future     Standing Expiration Date:   2/3/2022    POCT glycosylated hemoglobin (Hb A1C)     DIABETES FOOT EXAM        Orders Placed This Encounter   Medications    Semaglutide, 1 MG/DOSE, (OZEMPIC, 1 MG/DOSE,) 2 MG/1.5ML SOPN     Sig: Inject 1 mg into the skin once a week     Dispense:  5 pen

## 2021-03-08 ENCOUNTER — HOSPITAL ENCOUNTER (OUTPATIENT)
Dept: MAMMOGRAPHY | Facility: CLINIC | Age: 66
Discharge: HOME | End: 2021-03-10
Payer: MEDICARE

## 2021-03-08 DIAGNOSIS — Z78.0 POSTMENOPAUSE: ICD-10-CM

## 2021-03-08 DIAGNOSIS — Z12.31 VISIT FOR SCREENING MAMMOGRAM: ICD-10-CM

## 2021-03-08 PROCEDURE — 77080 DXA BONE DENSITY AXIAL: CPT

## 2021-03-08 PROCEDURE — 77067 SCR MAMMO BI INCL CAD: CPT

## 2021-07-29 ENCOUNTER — OFFICE VISIT (OUTPATIENT)
Dept: PRIMARY CARE CLINIC | Age: 66
End: 2021-07-29
Payer: MEDICARE

## 2021-07-29 VITALS
HEIGHT: 66 IN | HEART RATE: 67 BPM | WEIGHT: 162.2 LBS | OXYGEN SATURATION: 96 % | DIASTOLIC BLOOD PRESSURE: 80 MMHG | RESPIRATION RATE: 13 BRPM | SYSTOLIC BLOOD PRESSURE: 118 MMHG | BODY MASS INDEX: 26.07 KG/M2

## 2021-07-29 DIAGNOSIS — E11.9 TYPE 2 DIABETES MELLITUS WITHOUT COMPLICATION, WITHOUT LONG-TERM CURRENT USE OF INSULIN (HCC): Primary | ICD-10-CM

## 2021-07-29 DIAGNOSIS — Z23 NEED FOR PNEUMOCOCCAL VACCINE: ICD-10-CM

## 2021-07-29 DIAGNOSIS — I48.91 ATRIAL FIBRILLATION, UNSPECIFIED TYPE (HCC): ICD-10-CM

## 2021-07-29 DIAGNOSIS — J44.9 CHRONIC OBSTRUCTIVE PULMONARY DISEASE, UNSPECIFIED COPD TYPE (HCC): ICD-10-CM

## 2021-07-29 PROBLEM — M75.42 IMPINGEMENT SYNDROME OF BOTH SHOULDERS: Status: ACTIVE | Noted: 2021-02-12

## 2021-07-29 PROBLEM — M75.41 IMPINGEMENT SYNDROME OF BOTH SHOULDERS: Status: ACTIVE | Noted: 2021-02-12

## 2021-07-29 LAB — HBA1C MFR BLD: 6.6 %

## 2021-07-29 PROCEDURE — G0009 ADMIN PNEUMOCOCCAL VACCINE: HCPCS | Performed by: NURSE PRACTITIONER

## 2021-07-29 PROCEDURE — 90732 PPSV23 VACC 2 YRS+ SUBQ/IM: CPT | Performed by: NURSE PRACTITIONER

## 2021-07-29 PROCEDURE — 99214 OFFICE O/P EST MOD 30 MIN: CPT | Performed by: NURSE PRACTITIONER

## 2021-07-29 PROCEDURE — 83036 HEMOGLOBIN GLYCOSYLATED A1C: CPT | Performed by: NURSE PRACTITIONER

## 2021-07-29 RX ORDER — OMEPRAZOLE 10 MG/1
10 CAPSULE, DELAYED RELEASE ORAL DAILY
COMMUNITY

## 2021-07-29 RX ORDER — SEMAGLUTIDE 1.34 MG/ML
1 INJECTION, SOLUTION SUBCUTANEOUS WEEKLY
Qty: 5 PEN | Refills: 3 | Status: SHIPPED | OUTPATIENT
Start: 2021-07-29 | End: 2022-10-12

## 2021-07-29 SDOH — ECONOMIC STABILITY: FOOD INSECURITY: WITHIN THE PAST 12 MONTHS, THE FOOD YOU BOUGHT JUST DIDN'T LAST AND YOU DIDN'T HAVE MONEY TO GET MORE.: NEVER TRUE

## 2021-07-29 SDOH — ECONOMIC STABILITY: FOOD INSECURITY: WITHIN THE PAST 12 MONTHS, YOU WORRIED THAT YOUR FOOD WOULD RUN OUT BEFORE YOU GOT MONEY TO BUY MORE.: NEVER TRUE

## 2021-07-29 ASSESSMENT — ENCOUNTER SYMPTOMS
ABDOMINAL PAIN: 0
COUGH: 0
BACK PAIN: 0
SHORTNESS OF BREATH: 0

## 2021-07-29 ASSESSMENT — SOCIAL DETERMINANTS OF HEALTH (SDOH): HOW HARD IS IT FOR YOU TO PAY FOR THE VERY BASICS LIKE FOOD, HOUSING, MEDICAL CARE, AND HEATING?: NOT HARD AT ALL

## 2021-07-29 NOTE — PROGRESS NOTES
[address] PRIMARY CARE  [address] [facility] 15[address]  [address] [address]  Dept: [phone]  Dept Fax: [phone]    Raisa Frazier is a 77 y.o. female who presentstoday for her medical conditions/complaints as noted below. Raisa Frazier is c/o of  Chief Complaint   Patient presents with    Diabetes    6 Month Follow-Up    Discuss Medications     Diabetes     Health Maintenance     had cologuard last spring           HPI:     Presents for 6 month recheck on chronic conditions  BP well controlled  Has lost 7lb since last visit in February    Hga1c from 6.2 to 6.6  Foot exam with Dr. Jw Rock in [place]  Diabetic eye exam Dr. Grayson Ulrich  Following plant based diet with Dr. 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  Ratio (mcg/mg creat)   Date Value   2017 6     LDL Cholesterol (mg/dL)   Date Value   2020 94   2019 81   03/15/2018 101       (goal LDL is <100)   AST (U/L)   Date Value   2020 22     ALT (U/L)   Date Value   2020 20     BUN (mg/dL)   Date Value   2020 12     BP Readings from Last 3 Encounters:   21 118/80   21 126/80   19 128/74          ([id number])    Past Medical History:   Diagnosis Date    Diabetes mellitus (HCC)     Fibromyalgia     GERD (gastroesophageal reflux disease)       Past Surgical History:   Procedure Laterality Date    CARPAL TUNNEL RELEASE      FINGER TRIGGER RELEASE Right 2018    ring    FINGER TRIGGER RELEASE N/A 2018 Meningococcal (ACWY) vaccine  Aged Out    Hepatitis C screen  Discontinued       Subjective:      Review of Systems   Constitutional: Negative for chills, fatigue and fever. HENT: Negative for congestion. Eyes: Negative for visual disturbance. Respiratory: Negative for cough and shortness of breath. Cardiovascular: Negative for chest pain and palpitations. Gastrointestinal: Negative for abdominal pain. Genitourinary: Negative for dysuria. Musculoskeletal: Negative for back pain. Neurological: Negative for dizziness, numbness and headaches. Psychiatric/Behavioral: Negative for self-injury, sleep disturbance and suicidal ideas. The patient is not nervous/anxious. Objective:     Physical Exam  Vitals and nursing note reviewed. Constitutional:       Appearance: She is well-developed. HENT:      Head: Normocephalic and atraumatic. Eyes:      Pupils: Pupils are equal, round, and reactive to light. Cardiovascular:      Rate and Rhythm: Normal rate and regular rhythm. Heart sounds: Normal heart sounds. Pulmonary:      Effort: Pulmonary effort is normal.      Breath sounds: Normal breath sounds. Abdominal:      General: Bowel sounds are normal.      Palpations: Abdomen is soft. Tenderness: There is no abdominal tenderness. Musculoskeletal:         General: Normal range of motion. Cervical back: Normal range of motion and neck supple. Skin:     General: Skin is warm and dry. Neurological:      Mental Status: She is alert and oriented to person, place, and time. Psychiatric:         Behavior: Behavior normal.         Thought Content: Thought content normal.         Judgment: Judgment normal.       /80   Pulse 67   Resp 13   Ht 5' 6\" (1.676 m)   Wt 162 lb 3.2 oz (73.6 kg)   SpO2 96%   Breastfeeding No   BMI 26.18 kg/m²     Assessment:       Diagnosis Orders   1.  Type 2 diabetes mellitus without complication, without long-term current use of insulin ([facility]  POCT glycosylated hemoglobin (Hb A1C)   2. Chronic obstructive pulmonary disease, unspecified COPD type ([facility]     3. Atrial fibrillation, unspecified type ([facility]     4. Need for pneumococcal vaccine  Pneumococcal polysaccharide vaccine 23-valent greater than or equal to 3yo subcutaneous/IM             Plan:      Return in about 6 months (around 1/29/2022) for AWV. 1. Chronic conditions- Stable. Continue current meds. Encouraged diet/exercise. Follow up in six months for recheck/AWV. Orders Placed This Encounter   Procedures    Pneumococcal polysaccharide vaccine 23-valent greater than or equal to 3yo subcutaneous/IM    POCT glycosylated hemoglobin (Hb A1C)        Orders Placed This Encounter   Medications    Semaglutide, 1 MG/DOSE, (OZEMPIC, 1 MG/DOSE,) 2 MG/1.5ML SOPN     Sig: Inject 1 mg into the skin once a week     Dispense:  5 pen     Refill:  3       Patient given educational materials - see patient instructions. Discussed use, benefit, and side effects of prescribed medications. All patientquestions answered. Pt voiced understanding. Reviewed health maintenance. Instructedto continue current medications, diet and exercise. Patient agreed with treatmentplan. Follow up as directed.      Electronicallysigned by DEISY Claros CNP on 7/29/2021 at 11:19 AM

## 2021-08-27 ENCOUNTER — HOSPITAL ENCOUNTER (EMERGENCY)
Age: 66
Discharge: HOME | End: 2021-08-27
Attending: EMERGENCY MEDICINE
Payer: MEDICARE

## 2021-08-27 VITALS
SYSTOLIC BLOOD PRESSURE: 170 MMHG | TEMPERATURE: 98 F | HEART RATE: 74 BPM | DIASTOLIC BLOOD PRESSURE: 101 MMHG | RESPIRATION RATE: 16 BRPM | OXYGEN SATURATION: 95 %

## 2021-08-27 DIAGNOSIS — R10.9 FLANK PAIN: Primary | ICD-10-CM

## 2021-08-27 DIAGNOSIS — N30.00 ACUTE CYSTITIS WITHOUT HEMATURIA: ICD-10-CM

## 2021-08-27 LAB
-: ABNORMAL
AMORPHOUS: ABNORMAL
ANION GAP SERPL CALCULATED.3IONS-SCNC: 12 MMOL/L (ref 9–17)
BACTERIA: ABNORMAL
BILIRUBIN URINE: NEGATIVE
BUN BLDV-MCNC: 15 MG/DL (ref 8–23)
BUN/CREAT BLD: ABNORMAL (ref 9–20)
CALCIUM SERPL-MCNC: 9.4 MG/DL (ref 8.6–10.4)
CASTS UA: ABNORMAL /LPF
CHLORIDE BLD-SCNC: 103 MMOL/L (ref 98–107)
CO2: 26 MMOL/L (ref 20–31)
COLOR: YELLOW
COMMENT UA: ABNORMAL
CREAT SERPL-MCNC: 0.72 MG/DL (ref 0.5–0.9)
CRYSTALS, UA: ABNORMAL /HPF
EPITHELIAL CELLS UA: ABNORMAL /HPF (ref 0–5)
GFR AFRICAN AMERICAN: >60 ML/MIN
GFR NON-AFRICAN AMERICAN: >60 ML/MIN
GFR SERPL CREATININE-BSD FRML MDRD: ABNORMAL ML/MIN/{1.73_M2}
GFR SERPL CREATININE-BSD FRML MDRD: ABNORMAL ML/MIN/{1.73_M2}
GLUCOSE BLD-MCNC: 106 MG/DL (ref 70–99)
GLUCOSE URINE: NEGATIVE
KETONES, URINE: NEGATIVE
LEUKOCYTE ESTERASE, URINE: ABNORMAL
MUCUS: ABNORMAL
NITRITE, URINE: NEGATIVE
OTHER OBSERVATIONS UA: ABNORMAL
PH UA: 6.5 (ref 5–8)
POTASSIUM SERPL-SCNC: 3.8 MMOL/L (ref 3.7–5.3)
PROTEIN UA: NEGATIVE
RBC UA: ABNORMAL /HPF (ref 0–2)
RENAL EPITHELIAL, UA: ABNORMAL /HPF
SODIUM BLD-SCNC: 141 MMOL/L (ref 135–144)
SPECIFIC GRAVITY UA: 1.02 (ref 1–1.03)
TRICHOMONAS: ABNORMAL
TURBIDITY: CLEAR
URINE HGB: NEGATIVE
UROBILINOGEN, URINE: NORMAL
WBC UA: ABNORMAL /HPF (ref 0–5)
YEAST: ABNORMAL

## 2021-08-27 PROCEDURE — 2580000003 HC RX 258: Performed by: EMERGENCY MEDICINE

## 2021-08-27 PROCEDURE — 99284 EMERGENCY DEPT VISIT MOD MDM: CPT

## 2021-08-27 PROCEDURE — 36415 COLL VENOUS BLD VENIPUNCTURE: CPT

## 2021-08-27 PROCEDURE — 81001 URINALYSIS AUTO W/SCOPE: CPT

## 2021-08-27 PROCEDURE — 80048 BASIC METABOLIC PNL TOTAL CA: CPT

## 2021-08-27 PROCEDURE — 6360000002 HC RX W HCPCS: Performed by: EMERGENCY MEDICINE

## 2021-08-27 PROCEDURE — 96374 THER/PROPH/DIAG INJ IV PUSH: CPT

## 2021-08-27 PROCEDURE — 6370000000 HC RX 637 (ALT 250 FOR IP): Performed by: EMERGENCY MEDICINE

## 2021-08-27 RX ORDER — 0.9 % SODIUM CHLORIDE 0.9 %
1000 INTRAVENOUS SOLUTION INTRAVENOUS ONCE
Status: COMPLETED | OUTPATIENT
Start: 2021-08-27 | End: 2021-08-27

## 2021-08-27 RX ORDER — NITROFURANTOIN 25; 75 MG/1; MG/1
100 CAPSULE ORAL 2 TIMES DAILY
Qty: 10 CAPSULE | Refills: 0 | Status: SHIPPED | OUTPATIENT
Start: 2021-08-27 | End: 2021-09-01

## 2021-08-27 RX ORDER — KETOROLAC TROMETHAMINE 30 MG/ML
30 INJECTION, SOLUTION INTRAMUSCULAR; INTRAVENOUS ONCE
Status: COMPLETED | OUTPATIENT
Start: 2021-08-27 | End: 2021-08-27

## 2021-08-27 RX ORDER — NITROFURANTOIN 25; 75 MG/1; MG/1
100 CAPSULE ORAL ONCE
Status: COMPLETED | OUTPATIENT
Start: 2021-08-27 | End: 2021-08-27

## 2021-08-27 RX ADMIN — NITROFURANTOIN (MONOHYDRATE/MACROCRYSTALS) 100 MG: 75; 25 CAPSULE ORAL at 02:54

## 2021-08-27 RX ADMIN — KETOROLAC TROMETHAMINE 30 MG: 30 INJECTION, SOLUTION INTRAMUSCULAR; INTRAVENOUS at 02:54

## 2021-08-27 RX ADMIN — SODIUM CHLORIDE 1000 ML: 9 INJECTION, SOLUTION INTRAVENOUS at 02:06

## 2021-08-27 ASSESSMENT — PAIN DESCRIPTION - LOCATION: LOCATION: FLANK

## 2021-08-27 ASSESSMENT — PAIN SCALES - GENERAL: PAINLEVEL_OUTOF10: 10

## 2021-08-27 ASSESSMENT — ENCOUNTER SYMPTOMS
ALLERGIC/IMMUNOLOGIC NEGATIVE: 1
EYES NEGATIVE: 1
GASTROINTESTINAL NEGATIVE: 1
RESPIRATORY NEGATIVE: 1

## 2021-08-27 ASSESSMENT — PAIN DESCRIPTION - FREQUENCY: FREQUENCY: CONTINUOUS

## 2021-08-27 ASSESSMENT — PAIN DESCRIPTION - ORIENTATION: ORIENTATION: LEFT

## 2021-08-27 ASSESSMENT — PAIN DESCRIPTION - PAIN TYPE: TYPE: ACUTE PAIN

## 2021-08-27 ASSESSMENT — PAIN DESCRIPTION - DESCRIPTORS: DESCRIPTORS: SHARP

## 2021-08-27 NOTE — ED PROVIDER NOTES
eMERGENCY dEPARTMENT eNCOUnter      Pt Name: Mi Vaughn  MRN: [id number]  [place] 1955  Date of evaluation: 8/27/2021      CHIEF COMPLAINT       Chief Complaint   Patient presents with    Flank Pain          HISTORY OF PRESENT ILLNESS    Mi Vaughn is a 74 y.o. female who presents department for evaluation of left-sided flank pain that she says is been going on for just about 3 days. Patient relates no dysuria no urinary frequency no fevers no chills and no vaginal discharge. She does not have any inguinal pain. That is all in her back states that it is worse when she tries to exhale suddenly. Rating this about a 5-6 out of 10 with no palliative but provocative is taking a deep breath or exhaling suddenly. REVIEW OF SYSTEMS       Review of Systems   Constitutional: Negative. HENT: Negative. Eyes: Negative. Respiratory: Negative. Cardiovascular: Negative. Gastrointestinal: Negative. Endocrine: Negative. Genitourinary: Positive for flank pain. Negative for difficulty urinating and dysuria. Skin: Negative. Allergic/Immunologic: Negative. Neurological: Negative. Hematological: Negative. Psychiatric/Behavioral: Negative. PAST MEDICAL HISTORY    has a past medical history of Diabetes mellitus ([facility] Fibromyalgia, and GERD (gastroesophageal reflux disease). SURGICAL HISTORY      has a past surgical history that includes Carpal tunnel release; Ulnar tunnel release (Bilateral); Tubal ligation; Finger trigger release (Right, 12/07/2018); and Finger trigger release (N/A, 12/7/2018). CURRENT MEDICATIONS       Previous Medications    GLUCOSE BLOOD VI TEST STRIPS (ASCENSIA AUTODISC VI;ONE TOUCH ULTRA TEST VI) STRIP    1 each by Other route daily Use with associated glucose meter.     HANDICAP PLACARD MISC    by Does not apply route Expires 4/2025    OMEPRAZOLE (PRILOSEC) 10 MG DELAYED RELEASE CAPSULE    Take 10 mg by mouth daily    SEMAGLUTIDE, 1 MG/DOSE, (OZEMPIC, 1 MG/DOSE,) 2 MG/1.5ML SOPN    Inject 1 mg into the skin once a week       ALLERGIES     is allergic to codeine. FAMILY HISTORY     She indicated that the status of her maternal grandmother is unknown. She indicated that the status of her maternal grandfather is unknown.     family history includes Diabetes in her maternal grandfather and maternal grandmother. SOCIAL HISTORY      reports that she quit smoking about 16 years ago. Her smoking use included cigarettes. She has a 45.00 pack-year smoking history. She has never used smokeless tobacco. She reports that she does not drink alcohol and does not use drugs. PHYSICAL EXAM     INITIAL VITALS:  oral temperature is 98 °F (36.7 °C). Her blood pressure is 170/101 (abnormal) and her pulse is 74. Her respiration is 16 and oxygen saturation is 95%. Constitutional: Alert, oriented x3, nontoxic, afebrile, answering questions appropriately, acting properly for age, in no acute distress  HEENT: Extraocular muscles intact, mucus membranes moist, TMs clear bilaterally, no posterior pharyngeal erythema or exudates, Pupils equal, round, reactive to light,   Neck: Trachea midline, Supple without lymphadenopathy, no posterior midline neck tenderness to palpation  Cardiovascular: Regular rhythm and rate no S3, S4, or murmurs  Respiratory: Clear to auscultation bilaterally no wheezes, rhonchi, rales, no respiratory distress  Gastrointestinal: Soft, nontender, nondistended, positive bowel sounds. No rebound, rigidity, or guarding. Musculoskeletal: No extremity pain or swelling. Mild CVA tenderness noted on the left-hand side.   Neurologic: Moving all 4 extremities without difficulty there are no gross focal neurologic deficits  Skin: Warm and dry      DIFFERENTIAL DIAGNOSIS/ MDM:     Flank pain uncertain etiology, patient will get a urinalysis some laboratories and I will give her some Toradol and some fluids and then reevaluate the need for radiology. DIAGNOSTIC RESULTS     EKG: All EKG's are interpreted by the Emergency Department Physician who either signs or Co-signs this chart in the absence of a cardiologist.        Not indicated unless otherwise documented above    LABS:  Results for orders placed or performed during the hospital encounter of 59/10/88   Basic Metabolic Panel   Result Value Ref Range    Glucose 106 (H) 70 - 99 mg/dL    BUN 15 8 - 23 mg/dL    CREATININE 0.72 0.50 - 0.90 mg/dL    Bun/Cre Ratio NOT REPORTED 9 - 20    Calcium 9.4 8.6 - 10.4 mg/dL    Sodium 141 135 - 144 mmol/L    Potassium 3.8 3.7 - 5.3 mmol/L    Chloride 103 98 - 107 mmol/L    CO2 26 20 - 31 mmol/L    Anion Gap 12 9 - 17 mmol/L    GFR Non-African American >60 >60 mL/min    GFR African American >60 >60 mL/min    GFR Comment          GFR Staging NOT REPORTED    Urinalysis Reflex to Culture    Specimen: Urine, clean catch   Result Value Ref Range    Color, UA YELLOW YELLOW    Turbidity UA CLEAR CLEAR    Glucose, Ur NEGATIVE NEGATIVE    Bilirubin Urine NEGATIVE NEGATIVE    Ketones, Urine NEGATIVE NEGATIVE    Specific Gravity, UA 1.020 1.005 - 1.030    Urine Hgb NEGATIVE NEGATIVE    pH, UA 6.5 5.0 - 8.0    Protein, UA NEGATIVE NEGATIVE    Urobilinogen, Urine Normal Normal    Nitrite, Urine NEGATIVE NEGATIVE    Leukocyte Esterase, Urine SMALL (A) NEGATIVE    Urinalysis Comments NOT REPORTED    Microscopic Urinalysis   Result Value Ref Range    -          WBC, UA 5 TO 10 0 - 5 /HPF    RBC, UA None 0 - 2 /HPF    Casts UA NOT REPORTED /LPF    Crystals, UA NOT REPORTED None /HPF    Epithelial Cells UA 5 TO 10 0 - 5 /HPF    Renal Epithelial, UA NOT REPORTED 0 /HPF    Bacteria, UA FEW (A) None    Mucus, UA NOT REPORTED None    Trichomonas, UA NOT REPORTED None    Amorphous, UA NOT REPORTED None    Other Observations UA (A) NOT REQ. Utilizing a urinalysis as the only screening method to exclude a potential uropathogen can be unreliable in many patient populations.   Rapid screening tests are less sensitive than culture and if UTI is a clinical possibility, culture should be considered despite a negative urinalysis. Yeast, UA NOT REPORTED None       Not indicated unless otherwise documented above    RADIOLOGY:   I reviewed the radiologist interpretations:  No orders to display       Not indicated unless otherwise documented above    EMERGENCY DEPARTMENT COURSE:     The patient was given the following medications:  Orders Placed This Encounter   Medications    0.9 % sodium chloride bolus    nitrofurantoin (macrocrystal-monohydrate) (MACROBID) capsule 100 mg    ketorolac (TORADOL) injection 30 mg        Vitals:    Vitals:    08/27/21 0122 08/27/21 0141   BP: (!) 170/101    Pulse: 74    Resp: 16    Temp:  98 °F (36.7 °C)   TempSrc:  Oral   SpO2: 95%      -------------------------  BP (!) 170/101   Pulse 74   Temp 98 °F (36.7 °C) (Oral)   Resp 16   SpO2 95%         I have reviewed the disposition diagnosis with the patient and or their family/guardian. I have answered their questions and given discharge instructions. They voiced understanding of these instructions and did not have any further questions or complaints. CRITICAL CARE:    None    CONSULTS:    None    PROCEDURES:    None      OARRS Report if indicated             FINAL IMPRESSION      1. Flank pain    2. Acute cystitis without hematuria          DISPOSITION/PLAN   DISPOSITION Decision To Discharge    I have reviewed the disposition diagnosis with the patient and or their family/guardian. I have answered their questions and given discharge instructions. They voiced understanding of these instructions and did not have any further questions or complaints.         Reevaluation: Patient states that she is feeling somewhat better at this point time I will still give her some Toradol here before she goes she does have a urine infection for which she will get some antibiotics and then she is stable for discharge home to follow-up with her own doctors. Kidney stone was on the differential diagnosis however the patient does not appear to have a typical stone presentation. At this time it was elected not to do any radiographic studies.       PATIENT REFERRED TO:  DEISY Ordonez - CNP  [address] [place] [address] [address]  [address]  [phone]    In 1 day        DISCHARGE MEDICATIONS:  New Prescriptions    No medications on file       (Please note that portions of this note were completed with a voice recognition program.  Efforts were made to edit the dictations but occasionally words are mis-transcribed.)    Ashwin Frederick MD,  Attending Emergency Physician           Ashwin Frederick MD  08/27/21 [id number]

## 2021-09-10 ENCOUNTER — OFFICE VISIT (OUTPATIENT)
Dept: PRIMARY CARE CLINIC | Age: 66
End: 2021-09-10
Payer: MEDICARE

## 2021-09-10 VITALS
OXYGEN SATURATION: 98 % | SYSTOLIC BLOOD PRESSURE: 130 MMHG | WEIGHT: 164.8 LBS | BODY MASS INDEX: 26.48 KG/M2 | HEART RATE: 86 BPM | DIASTOLIC BLOOD PRESSURE: 72 MMHG | HEIGHT: 66 IN

## 2021-09-10 DIAGNOSIS — R10.9 LEFT FLANK PAIN: Primary | ICD-10-CM

## 2021-09-10 DIAGNOSIS — N89.8 VAGINAL ITCHING: ICD-10-CM

## 2021-09-10 PROCEDURE — 99214 OFFICE O/P EST MOD 30 MIN: CPT | Performed by: NURSE PRACTITIONER

## 2021-09-10 RX ORDER — CLOTRIMAZOLE AND BETAMETHASONE DIPROPIONATE 10; .64 MG/G; MG/G
CREAM TOPICAL 2 TIMES DAILY
Qty: 45 G | Refills: 0 | Status: SHIPPED | OUTPATIENT
Start: 2021-09-10 | End: 2022-07-27

## 2021-09-10 ASSESSMENT — ENCOUNTER SYMPTOMS
SHORTNESS OF BREATH: 0
BACK PAIN: 0
COUGH: 0
ABDOMINAL PAIN: 1

## 2021-09-10 NOTE — PROGRESS NOTES
[address] PRIMARY CARE  [address] [facility] 1560  [address] [address]  Dept: [phone]  Dept Fax: [phone]    Jackson Doyle is a 74 y.o. female who presentstoday for her medical conditions/complaints as noted below. Jackson Doyle is c/o of  Chief Complaint   Patient presents with    ED Follow-up         HPI:     Presents for ER follow up  BP well controlled  Has gained 2lb since last visit    ER visit 21  Left flank pain, originally noted after waking up from a nap but it became so bad she sought emergent treatment  Given macrobid with small improvement in pain  Waited after completion of antibiotic, but would like further eval as pain is still present  Denies any n/v, changes in bowel/bladder    Requesting refill on vaginal cream for itching  Using externally only, Lotrisone was filled in the past and this worked well for her      Hemoglobin A1C (%)   Date Value   2021 6.6   2021 6.2   2020 8.4 (H)             ( goal A1C is < 7)   Microalb/Crt.  Ratio (mcg/mg creat)   Date Value   2017 6     LDL Cholesterol (mg/dL)   Date Value   2020 94   2019 81   03/15/2018 101       (goal LDL is <100)   AST (U/L)   Date Value   2020 22     ALT (U/L)   Date Value   2020 20     BUN (mg/dL)   Date Value   2021 15     BP Readings from Last 3 Encounters:   09/10/21 130/72   21 (!) 170/101   21 118/80          ([id number])    Past Medical History:   Diagnosis Date    Diabetes mellitus ([address]     Fibromyalgia     GERD (gastroesophageal reflux disease)       Past Surgical History:   Procedure Laterality Date    CARPAL TUNNEL RELEASE      FINGER TRIGGER RELEASE Right 2018    ring    FINGER TRIGGER RELEASE N/A 2018    RIGHT RING FINGER TRIGGER RELEASE performed by Johan Rosario DO at [address] [place] [address]      ULNAR TUNNEL RELEASE Bilateral        Family History   Problem Relation Age of Onset    Diabetes Maternal Grandmother     Diabetes Maternal Grandfather           Social History     Tobacco Use    Smoking status: Former Smoker     Packs/day: 1.50     Years: 30.00     Pack years: 45.00     Types: Cigarettes     Quit date: 2005     Years since quittin.3    Smokeless tobacco: Never Used   Substance Use Topics    Alcohol use: No      Current Outpatient Medications   Medication Sig Dispense Refill    clotrimazole-betamethasone (LOTRISONE) 1-0.05 % cream Apply topically 2 times daily Apply topically 2 times daily. 45 g 0    omeprazole (PRILOSEC) 10 MG delayed release capsule Take 10 mg by mouth daily      Semaglutide, 1 MG/DOSE, (OZEMPIC, 1 MG/DOSE,) 2 MG/1.5ML SOPN Inject 1 mg into the skin once a week 5 pen 3    Handicap Placard MISC by Does not apply route Expires 2025 1 each 0    glucose blood VI test strips (ASCENSIA AUTODISC VI;ONE TOUCH ULTRA TEST VI) strip 1 each by Other route daily Use with associated glucose meter. 100 strip 11     No current facility-administered medications for this visit.      Allergies   Allergen Reactions    Codeine Nausea Only       Health Maintenance   Topic Date Due    COVID-19 Vaccine (1) Never done    Colon Cancer Screen FIT/FOBT  2018    Annual Wellness Visit (AWV)  Never done    Lipid screen  2021    Flu vaccine (1) Never done    DTaP/Tdap/Td vaccine (1 - Tdap) 10/01/2021 (Originally 1974)    Shingles Vaccine (1 of 2) 2022 (Originally 2003)    Diabetic retinal exam  2022 (Originally 1/3/2018)    Diabetic microalbuminuria test  2021    Diabetic foot exam  2022    A1C test (Diabetic or Prediabetic)  2022    Breast cancer screen  2023    DEXA (modify frequency per FRAX score)  Completed    Pneumococcal 65+ years Vaccine  Completed    Hepatitis A vaccine  Aged Out    Hib vaccine  Aged Out    Meningococcal (ACWY) vaccine  Aged Out    Hepatitis C screen Recommendation   2. Vaginal itching               Plan:      Return if symptoms worsen or fail to improve. 1. Left flank pain- Not improved with use of antibiotic, rx given for CT abdomen. Follow up pending results. 2. Vaginal itching- Rx renewed for lotrisone, follow up as needed. Orders Placed This Encounter   Procedures    CT ABDOMEN PELVIS W IV CONTRAST Additional Contrast? Radiologist Recommendation     Standing Status:   Future     Standing Expiration Date:   9/10/2022     Order Specific Question:   Additional Contrast?     Answer:   Radiologist Recommendation     Order Specific Question:   Reason for exam:     Answer:   continued left flank pain, no improvement after ER visit 8.27.21        Orders Placed This Encounter   Medications    clotrimazole-betamethasone (LOTRISONE) 1-0.05 % cream     Sig: Apply topically 2 times daily Apply topically 2 times daily. Dispense:  45 g     Refill:  0       Patient given educational materials - see patient instructions. Discussed use, benefit, and side effects of prescribed medications. All patientquestions answered. Pt voiced understanding. Reviewed health maintenance. Instructedto continue current medications, diet and exercise. Patient agreed with treatmentplan. Follow up as directed.      Electronicallysigned by DEISY Martinez CNP on 9/10/2021 at 2:02 PM

## 2021-09-14 ENCOUNTER — TELEPHONE (OUTPATIENT)
Dept: PRIMARY CARE CLINIC | Age: 66
End: 2021-09-14

## 2021-09-14 DIAGNOSIS — R10.9 ABDOMINAL PAIN, UNSPECIFIED ABDOMINAL LOCATION: Primary | ICD-10-CM

## 2021-09-16 ENCOUNTER — HOSPITAL ENCOUNTER (OUTPATIENT)
Dept: CT IMAGING | Age: 66
Discharge: HOME | End: 2021-09-18
Payer: MEDICARE

## 2021-09-16 DIAGNOSIS — R10.9 ABDOMINAL PAIN, UNSPECIFIED ABDOMINAL LOCATION: ICD-10-CM

## 2021-09-16 PROCEDURE — 74176 CT ABD & PELVIS W/O CONTRAST: CPT

## 2021-10-28 ENCOUNTER — TELEPHONE (OUTPATIENT)
Dept: PRIMARY CARE CLINIC | Age: 66
End: 2021-10-28

## 2021-10-28 DIAGNOSIS — J01.90 ACUTE BACTERIAL SINUSITIS: Primary | ICD-10-CM

## 2021-10-28 DIAGNOSIS — B96.89 ACUTE BACTERIAL SINUSITIS: Primary | ICD-10-CM

## 2021-10-28 RX ORDER — AZITHROMYCIN 250 MG/1
TABLET, FILM COATED ORAL
Qty: 6 TABLET | Refills: 0 | Status: SHIPPED | OUTPATIENT
Start: 2021-10-28 | End: 2021-11-07

## 2021-10-28 NOTE — TELEPHONE ENCOUNTER
----- Message from Concepcion Winston sent at 10/27/2021  4:17 PM EDT -----  Subject: Message to Provider    QUESTIONS  Information for Provider? Patient states she has had a sore throat and   cough, denies having a fever. Patient has had these symptoms since 10/23. Patient would like to know if Dima Frazier could send something to the   pharmacy for her, pt did NOT want to schedule an appt at time of call. Patient would like a call back if Tulsa Gell can send something in for her,   or if Tul[place] Gell would rather her schedule a VV appt. Thank you. [facility]   pharmacy  ---------------------------------------------------------------------------  --------------  Amanda GREER  What is the best way for the office to contact you? OK to leave message on   voicemail  Preferred Call Back Phone Number? [phone]  ---------------------------------------------------------------------------  --------------  SCRIPT ANSWERS  Relationship to Patient?  Self

## 2021-12-02 ENCOUNTER — OFFICE VISIT (OUTPATIENT)
Dept: PRIMARY CARE CLINIC | Age: 66
End: 2021-12-02
Payer: MEDICARE

## 2021-12-02 VITALS
OXYGEN SATURATION: 97 % | BODY MASS INDEX: 27.13 KG/M2 | HEART RATE: 85 BPM | WEIGHT: 168.8 LBS | HEIGHT: 66 IN | SYSTOLIC BLOOD PRESSURE: 126 MMHG | DIASTOLIC BLOOD PRESSURE: 76 MMHG | RESPIRATION RATE: 16 BRPM

## 2021-12-02 DIAGNOSIS — M54.6 ACUTE LEFT-SIDED THORACIC BACK PAIN: Primary | ICD-10-CM

## 2021-12-02 DIAGNOSIS — R10.9 LEFT FLANK PAIN: ICD-10-CM

## 2021-12-02 PROCEDURE — 99214 OFFICE O/P EST MOD 30 MIN: CPT | Performed by: NURSE PRACTITIONER

## 2021-12-02 RX ORDER — TIZANIDINE 4 MG/1
4 TABLET ORAL 3 TIMES DAILY
Qty: 60 TABLET | Refills: 0 | Status: SHIPPED | OUTPATIENT
Start: 2021-12-02 | End: 2022-07-27

## 2021-12-02 ASSESSMENT — ENCOUNTER SYMPTOMS
DIARRHEA: 0
COUGH: 0
BACK PAIN: 1
ABDOMINAL PAIN: 0
NAUSEA: 0
SHORTNESS OF BREATH: 0

## 2021-12-02 NOTE — PROGRESS NOTES
[address] PRIMARY CARE  [address] [facility] [address]  [address] [address]  Dept: [phone]  Dept Fax: [phone]    Oleg Waters is a 74 y.o. female who presentstoday for her medical conditions/complaints as noted below. Oleg Waters is c/o of  Chief Complaint   Patient presents with   Herring Other     lower back and abdominal pain follow up, notes pain has not changed         HPI:     Presents for acute visit for continued flank pain  BP well controlled  Has gained 4lb since last OV    Pain originally noted in August, ER for eval  Treated for UTI without improvement  Follow up in office with CT abdomen- negative findings  Pain is worse with certain movements and when trying to sleep at night  Unable to sleep on her left side  No improvement with heat/ice  Denies any mechanism of injury  Pinches left side to illustrate location of pain, but when she twists to demonstrate is when pain is noted  Pain comes/goes- sometimes takes her breath away  No changes noted with diet  No changes noted to bladder/bowels    Denies any other problems/concerns      Hemoglobin A1C (%)   Date Value   2021 6.6   2021 6.2   2020 8.4 (H)             ( goal A1C is < 7)   Microalb/Crt.  Ratio (mcg/mg creat)   Date Value   2017 6     LDL Cholesterol (mg/dL)   Date Value   2020 94   2019 81   03/15/2018 101       (goal LDL is <100)   AST (U/L)   Date Value   2020 22     ALT (U/L)   Date Value   2020 20     BUN (mg/dL)   Date Value   2021 15     BP Readings from Last 3 Encounters:   21 126/76   09/10/21 130/72   21 (!) 170/101          ([id number])    Past Medical History:   Diagnosis Date    Diabetes mellitus ([address]     Fibromyalgia     GERD (gastroesophageal reflux disease)       Past Surgical History:   Procedure Laterality Date    CARPAL TUNNEL RELEASE      FINGER TRIGGER RELEASE Right 2018    ring    FINGER TRIGGER RELEASE N/A 2018    RIGHT RING FINGER TRIGGER RELEASE performed by Lisa Mcallister DO at [facility]      ULNAR TUNNEL RELEASE Bilateral        Family History   Problem Relation Age of Onset    Diabetes Maternal Grandmother     Diabetes Maternal Grandfather           Social History     Tobacco Use    Smoking status: Former Smoker     Packs/day: 1.50     Years: 30.00     Pack years: 45.00     Types: Cigarettes     Quit date: 2005     Years since quittin.5    Smokeless tobacco: Never Used   Substance Use Topics    Alcohol use: No      Current Outpatient Medications   Medication Sig Dispense Refill    tiZANidine (ZANAFLEX) 4 MG tablet Take 1 tablet by mouth 3 times daily 60 tablet 0    clotrimazole-betamethasone (LOTRISONE) 1-0.05 % cream Apply topically 2 times daily Apply topically 2 times daily. 45 g 0    omeprazole (PRILOSEC) 10 MG delayed release capsule Take 10 mg by mouth daily      Semaglutide, 1 MG/DOSE, (OZEMPIC, 1 MG/DOSE,) 2 MG/1.5ML SOPN Inject 1 mg into the skin once a week 5 pen 3    Handicap Placard MISC by Does not apply route Expires 2025 1 each 0    glucose blood VI test strips (ASCENSIA AUTODISC VI;ONE TOUCH ULTRA TEST VI) strip 1 each by Other route daily Use with associated glucose meter. 100 strip 11     No current facility-administered medications for this visit.      Allergies   Allergen Reactions    Codeine Nausea Only       Health Maintenance   Topic Date Due    COVID-19 Vaccine (1) Never done    Colon Cancer Screen FIT/FOBT  2018    Annual Wellness Visit (AWV)  Never done    Lipid screen  2021    Diabetic microalbuminuria test  2021    DTaP/Tdap/Td vaccine (1 - Tdap) 2021 (Originally 1972)    Shingles Vaccine (1 of 2) 2022 (Originally 2003)    Diabetic retinal exam  2022 (Originally 1/3/2018)    Flu vaccine (1) 2022 (Originally 2021)    Diabetic foot exam  2022  A1C test (Diabetic or Prediabetic)  07/29/2022    Breast cancer screen  03/08/2023    DEXA (modify frequency per FRAX score)  Completed    Pneumococcal 65+ years Vaccine  Completed    Hepatitis A vaccine  Aged Out    Hib vaccine  Aged Out    Meningococcal (ACWY) vaccine  Aged Out    Hepatitis C screen  Discontinued       Subjective:      Review of Systems   Constitutional: Negative for chills, fatigue and fever. HENT: Negative for congestion. Eyes: Negative for visual disturbance. Respiratory: Negative for cough and shortness of breath. Cardiovascular: Negative for chest pain and palpitations. Gastrointestinal: Negative for abdominal pain, diarrhea and nausea. Genitourinary: Positive for flank pain. Negative for dysuria. Musculoskeletal: Positive for back pain. Neurological: Negative for dizziness, numbness and headaches. Psychiatric/Behavioral: Negative for self-injury, sleep disturbance and suicidal ideas. The patient is not nervous/anxious. Objective:     Physical Exam  Vitals and nursing note reviewed. Constitutional:       Appearance: She is well-developed. HENT:      Head: Normocephalic and atraumatic. Eyes:      Pupils: Pupils are equal, round, and reactive to light. Cardiovascular:      Rate and Rhythm: Normal rate and regular rhythm. Heart sounds: Normal heart sounds. Pulmonary:      Effort: Pulmonary effort is normal.      Breath sounds: Normal breath sounds. Abdominal:      General: Bowel sounds are normal.      Palpations: Abdomen is soft. Tenderness: There is no abdominal tenderness. Musculoskeletal:         General: Normal range of motion. Cervical back: Normal range of motion and neck supple. Back:       Comments: Mid back flank pain illicited with certain position changes, twisting    Skin:     General: Skin is warm and dry. Neurological:      Mental Status: She is alert and oriented to person, place, and time.    Psychiatric: Behavior: Behavior normal.         Thought Content: Thought content normal.         Judgment: Judgment normal.       /76 (Site: Left Upper Arm, Position: Sitting, Cuff Size: Medium Adult)   Pulse 85   Resp 16   Ht 5' 6\" (1.676 m)   Wt 168 lb 12.8 oz (76.6 kg)   SpO2 97%   Breastfeeding No   BMI 27.25 kg/m²     Assessment:       Diagnosis Orders   1. Acute left-sided thoracic back pain  tiZANidine (ZANAFLEX) 4 MG tablet    [facility] [place]   2. Left flank pain  tiZANidine (ZANAFLEX) 4 MG tablet    [facility] [place]             Plan:      Return if symptoms worsen or fail to improve. 1. Back pain/flank pain- Rx given for zanaflex and referral to PT. Will also see her chiropractor. Follow up if no improvement in symptoms with use of meds or if treatment does not improve her symptoms. Orders Placed This Encounter   Procedures    [facility] [place]     Referral Priority:   Routine     Referral Type:   Eval and Treat     Referral Reason:   Specialty Services Required     Requested Specialty:   Physical Therapy     Number of Visits Requested:   1        Orders Placed This Encounter   Medications    tiZANidine (ZANAFLEX) 4 MG tablet     Sig: Take 1 tablet by mouth 3 times daily     Dispense:  60 tablet     Refill:  0       Patient given educational materials - see patient instructions. Discussed use, benefit, and side effects of prescribed medications. All patientquestions answered. Pt voiced understanding. Reviewed health maintenance. Instructedto continue current medications, diet and exercise. Patient agreed with treatmentplan. Follow up as directed.      Electronicallysigned by DEISY Montoya CNP on 12/2/2021 at 10:32 AM

## 2021-12-10 ENCOUNTER — HOSPITAL ENCOUNTER (OUTPATIENT)
Dept: PHYSICAL THERAPY | Facility: CLINIC | Age: 66
Setting detail: THERAPIES SERIES
Discharge: HOME | End: 2021-12-10
Payer: MEDICARE

## 2021-12-10 PROCEDURE — 97161 PT EVAL LOW COMPLEX 20 MIN: CPT

## 2021-12-10 NOTE — CONSULTS
[] [facility]) - [facility] &  Therapy  [address]  [phone]  F: [phone] [] [address]  [facility]   [address]  P: [phone]  F: [phone] [x] [address] &  Therapy  [address]  P: [phone]  F: [phone] [] [address]  P: [phone]  F: [phone] [] [address]  [facility]   [address]   [place]: [phone]  F: [phone]      Physical Therapy Spine Evaluation    Date:  12/10/2021  Patient: Ananya Kuhn  : 1955  MRN: [id number]  Physician: Joy Steven CNP   Insurance: [employer] (visits N)  Medical Diagnosis:   M54.6 (ICD-10-CM) - Acute left-sided thoracic back pain   R10.9 (ICD-10-CM) - Left flank pain     Rehab Codes: M54.6, M54.5, M62.81, R20.2   Onset Date: 21  Next 's appt.: PRN    Subjective:   CC/HPI: Patient reports to physical therapy with thoracic and flank pain. Patient reports that back in August she laid down for a nap and when she woke up she began to have increased pain in her low thoracic spine. Patient states she could walk but she had to bend over to do so. Patient states that she has had increased pain with sleeping and has to take increased time to get comfortable in order to fall asleep at night. Patient states that she tried to treat the pain with ice and heat with no relief. Patient states that [facility] and one morning that pain became unbearable. Patient went to the ER and was diagnosed with a kidney infection. Patient was prescribed antibiotics that were not beneficial. Patient states that she went to Joy Steven, where a CT scan was taken showing a fatty liver and diverticulitis.  Patient cont to have increased pain and went back to [facility], where a muscle relaxer, physical therapy and chiropractic care was prescribed. Patient states she has seen her chiropractor and feels a little bit better today. PMHx: [] Unremarkable [x] Diabetes- Medication  [] HTN  [] Pacemaker   [] MI/Heart Problems [] Cancer [] Arthritis [] Other:              [x] Refer to full medical chart  In EPIC       Comorbidities:   [] Obesity [] Dialysis  [] N/A   [] Asthma/COPD [] Dementia [] Other:   [] Stroke [] Sleep apnea [] Other:   [] Vascular disease [] Rheumatic disease [] Other:     Tests: [] X-Ray: [] MRI:  [x] Other: CT Scan     Medications: [x] Refer to full medical record [] None [] Other:  Allergies:      [x] Refer to full medical record [] None [] Other:    Function:  Hand Dominance  [] Right  [] Left  Employer Drives to [employer]    Job Status [x]  Normal duty   [] Light duty   [] Off due to condition    []  Retired   [] Not employed   [] Disability  [] Other:  []  Return to work:    Work activities/duties Drives [employer] [place]        ADL/IADL Previous level of function Current level of function Who currently assists the patient with task   Bathing  [x] Independent  [] Assist [x] Independent  [] Assist    Dress/grooming [x] Independent  [] Assist [x] Independent  [] Assist    Transfer/mobility [x] Independent  [] Assist [x] Independent  [] Assist Patient states she can stand up to 10 min before needing to sit again    Feeding [x] Independent  [] Assist [x] Independent  [] Assist    Toileting [x] Independent  [] Assist [x] Independent  [] Assist    Driving [x] Independent  [] Assist [x] Independent  [] Assist Drives with seat leaned back    Housekeeping [x] Independent  [] Assist [x] Independent  [] Assist    Grocery shop/meal prep [x] Independent  [] Assist [x] Independent  [] Assist Standing to meal prep is difficult       Pain:  [x] Yes  [] No Location: (L) Thoracic spine and (L) flank pain Pain Rating: (0-10 scale) 2-3/10 at rest, 9/10 at worst   Pain altered Tx:  [] Yes  [x] No  Action:    Symptoms:  [] Improving [] Worsening [x] Same  Better:  [] AM    [] PM    [] Sit    [] Rise/Sit    []Stand    [] Walk    [x] Lying    [x] Other: Chiropractor, Medication  Worse: [] AM    [] PM    [x] Sit    [] Rise/Sit    [x]Stand    [x] Walk    [] Lying    [] Bend                      [] Valsalva    [] Other:  Sleep: [x] OK    [] Disturbed     Objective:      STRENGTH  STRENGTH  ROM    Left Right  Left Right Cervical    C5 Shld Abd 5 4+ L1-2 Hip Flex 4 4+ Flexion    Shld Flexion 5 4+ Hip Abd   Extension       Knee flexion 4 4      Shld IR 5 5 L3-4 Knee Ext 4+ 4+ Rotation L R   Shld ER 5 3+ L4 Ankle DF 5 5 Sidebend L R   C6 Elb Flex 5 4 L5 EHL 5 5 Retraction    C7 Elb Ext 3+ 3+ S1 Plant.  Flex 4 5 Thoracic    C8 EPL 5 5 Abdominals   Flexion 100%   T1 Fing Abd 5 5 Erector Spinae   Extension 25%         Rotation L 75% R   50%         Sidebend L 100% R  100%         UE/LE           Shoulder flexion [facility] WFL         Shoulder ABD [facility] WFL                                      TESTS (+/-) LEFT RIGHT Not Tested   SLR [] sit [] supine   []   Hamstring (SLR)   []   SKTC (-) (-) []   DKTC   []   Slump/Dural   []   SI JT (-) (-) []   ROBERT (-) (-) []   FADDIR (-) (-) []       OBSERVATION No Deficit Deficit Not Tested Comments   Posture       Forward Head [] [x] []    Rounded Shoulders [] [x] []    Kyphosis [] [x] [] Decreased   Lordosis [] [x] [] Increased   Lateral Shift [] [] []    Scoliosis [] [] []    Iliac Crest [x] [] []    PSIS [x] [] []    ASIS [x] [] []    Genu Valgus [] [x] []    Genu Varus [x] [] []    Genu Recurvatum [x] [] []    Palpation [] [x] [] TTP at L1-L3 paraspinals, A/P mobs at (L) L1-L3 reproduced pain   Sensation [] [x] [] Numbness noted throughout (L) quad   Edema [] [] [x]    Neurological [] [] [x]        Functional Test: NDI Score: 20% functionally impaired     Comments:    Assessment:  Patient is a 74year old female who presents to physical therapy with (L) thoracic pain. Patient demonstrates impairments in pain tolerance, thoracic ROM, (B) LE strength, (B) UE strength and decreased vertebral mobility. These impairments affect the patient's standing tolerance, sleeping tolerance, ability to perform ADLs and ability to perform work related tasks. Patient would benefit from skilled physical therapy in order to improve impairments and overall quality of life. Educated patient on evaluation findings and poc. Educated patient on importance of stability within core and (B) hips in order to reduce tension on back. Patient verbalized good understanding of education at this time. Patient requested to leave early due to chiropractic appointment at 11:00 am.     Problems:    [x] ? Pain:  [x] ? ROM:  [x] ? Strength:  [x] ? Function:  [] Other:      STG: (to be met in 6 treatments)  1. ? Pain: Patient will report pain as 0/10   2. ? ROM: Patient will improve thoracic ROM to 100% in order to increase ease of performing ADLs   3. ? Function: Patient will report being able to stand up to 30 min in order to prep meals with ease  4. Patient to be independent with home exercise program as demonstrated by performance with correct form without cues. 5. Demonstrate Knowledge of fall prevention  LTG: (to be met in 12 treatments)  1. Patient will report NDI as 10% functionally impaired to indicate improved overall quality of life  2. Patient will improve (B) LE strength to 4+/5 in order to improve standing tolerance  3. Patient will improve (B) UE strength to 4+/5 in order to improve driving tolerance  4. Patient will report 2/10 pain at the worst       Patient goals:  \"Be able to complete Yoga training without pain\"    Rehab Potential:  [x] Good  [] Fair  [] Poor   Suggested Professional Referral:  [x] No  [] Yes:  Barriers to Goal Achievement:  [x] No  [] Yes:  Domestic Concerns:  [x] No  [] Yes:    Pt. Education:  [x] Plans/Goals, Risks/Benefits discussed  [x] Home exercise program  Access Code: [email or web address]  URL: [email or web address]  Date: 12/10/2021  Prepared by: Jordan Sanders    Exercises  Sidelying Thoracic Rotation with Open Book - 2 x daily - 7 x weekly - 3 sets - 10 reps - 5 sec hold  Modified Luis Alfredo Stretch - 2 x daily - 7 x weekly - 3 sets - 30 sec hold  Quadruped Thoracic Rotation - Reach Under - 2 x daily - 7 x weekly - 3 sets - 10 reps  Supine Figure 4 Piriformis Stretch - 2 x daily - 7 x weekly - 3 sets - 30 sec hold  Supine Bridge - 2 x daily - 7 x weekly - 3 sets - 10 reps  Clamshell - 2 x daily - 7 x weekly - 3 sets - 10 reps  Sidelying Hip Abduction - 2 x daily - 7 x weekly - 3 sets - 10 reps  Method of Education: [x] Verbal  [x] Demo  [x] Written  Comprehension of Education:  [x] Verbalizes understanding. [x] Demonstrates understanding. [x] Needs Review. [] Demonstrates/verbalizes understanding of HEP/Ed previously given. Treatment Plan:  [x] Therapeutic Exercise   99650  [] Iontophoresis: 4 mg/mL Dexamethasone Sodium Phosphate  mAmin  37034   [x] Therapeutic Activity  97254 [] Vasopneumatic cold with compression  13297    [] Gait Training   02636 [] Ultrasound   86907   [] Neuromuscular Re-education  28572 [] Electrical Stimulation Unattended  26421   [x] Manual Therapy  46348 [] Electrical Stimulation Attended  25727   [x] Instruction in HEP  [] Lumbar/Cervical Traction  64708   [] Aquatic Therapy   44192 [x] Cold/hotpack    [] Massage   44687      [] Dry Needling, 1 or 2 muscles  83830   [] Biofeedback, first 15 minutes   52243  [] Biofeedback, additional 15 minutes   65021 [] Dry Needling, 3 or more muscles  34213     [x]  Medication allergies reviewed for use of    Dexamethasone Sodium Phosphate 4mg/ml     with iontophoresis treatments. Pt is not allergic.     Frequency:  2 x/week for 12 visits    Todays Treatment:  Modalities:   Precautions:  Exercises:  Exercise Reps/ Time Weight/ Level Comments   Nustep DAVID S      Figure 4 S      Open Books       Modified Luis Alfredo PHAN      Thread the Needle            Bridges      Clamshells      SL Hip abd      Other:    Specific Instructions for next treatment: Progress core stability, (B) hip strengthening, Thoracic/lumbar mobility ex    Evaluation Complexity:  History (Personal factors, comorbidities) [x] 0 [] 1-2 [] 3+   Exam (limitations, restrictions) [x] 1-2 [] 3 [] 4+   Clinical presentation (progression) [x] Stable [] Evolving  [] Unstable   Decision Making [x] Low [] Moderate [] High    [x] Low Complexity [] Moderate Complexity [] High Complexity       Treatment Charges: Mins Units   [x] Evaluation       [x]  Low       []  Moderate       []  High 45 1   []  Modalities     []  Ther Exercise     []  Manual Therapy     []  Ther Activities     []  Aquatics     []  Vasocompression     []  Other       TOTAL TREATMENT TIME: 45 min    Time in: 10:00 am      Time out: 10:50 am    Electronically signed by: Royal Rai PT        Physician Signature:________________________________Date:__________________  By signing above or cosigning this note, I have reviewed this plan of care and certify a need for medically necessary rehabilitation services.      *PLEASE SIGN ABOVE AND FAX BACK ALL PAGES*

## 2021-12-15 ENCOUNTER — HOSPITAL ENCOUNTER (OUTPATIENT)
Dept: PHYSICAL THERAPY | Facility: CLINIC | Age: 66
Setting detail: THERAPIES SERIES
Discharge: HOME | End: 2021-12-15
Payer: MEDICARE

## 2021-12-15 PROCEDURE — 97110 THERAPEUTIC EXERCISES: CPT

## 2021-12-15 NOTE — FLOWSHEET NOTE
[] [facility] [address] [facility]  [address]  [phone]  F: [phone] [] [address]  [facility] 36   [address]  P: [phone]  F: [phone] [x] [address]  Outpatient Rehabilitation &  Therapy  [address]  P: [phone]  F: [phone] [] [address]  P: [phone]  F: [phone] [] [address]  [facility]   [address]   [place]: [phone]  F: [phone]      Physical Therapy Daily Treatment Note    Date:  12/15/2021  Patient Name:  Ananya Kuhn    :  1955  MRN: [id number]   Physician: Joy Steven CNP                                Insurance: [employer] (visits BMN)  Medical Diagnosis:   M54.6 (ICD-10-CM) - Acute left-sided thoracic back pain   R10.9 (ICD-10-CM) - Left flank pain      Rehab Codes: M54.6, M54.5, M62.81, R20.2   Onset Date: 21                 Next 's appt.: PRN    Visit# / total visits:  (2x wk)                  Cancels/No Shows:     Subjective:    Pain:  [x] Yes  [] No Location: (L) Thoracic spine and (L) flank pain   Pain Rating: (0-10 scale) 2-3/10 at rest, 9/10 at worst   Pain altered Tx:  [x] No  [] Yes  Action:  Comments: Pt stated had massage today followed by chiropractor and feels \"really good\" on arrival with minimal L sided discomfort.      Objective:  Modalities:   Precautions:  Exercises:  Exercise Reps/ Time Weight/ Level Comments   Nustep  8  L3               HS S  3x20\"    supine Rope   Figure 4 S  3x30\"       Open Books nithya   10x       Modified Luis Alfredo S  3x30\"       Thread the Needle nithya  10x       Prayer S 3-ways 2x20\"     Piriformis S 3x30\"                     Bridges  15x5\"       Clamshells  15       SL Hip abd  15       Other:     Specific Instructions for next treatment: Progress core stability, (B) hip strengthening, Thoracic/lumbar mobility ex       Treatment Charges: Mins Units   []  Modalities     [x]  Ther Exercise 35 2   []  Manual Therapy     []  Ther Activities     []  Aquatics     []  Vasocompression     []  Other     Total Treatment time 35 2       Assessment: [x] Progressing toward goals. Initiated with nustep for gentle warm up with reported pain. Pt demo good knowledge of clamshells, bridges, and hip abd with min cues to decrease compensation and no complaint of pain. Pt completed full stretching regiment with min cues in open book and prayer stretch with good follow through of tissue release post. Pt denied any increase of sxs post tx, continue to monitor pt response and progress regiment as pt elx. [] No change. [] Other:  [] Patient would continue to benefit from skilled physical therapy services in order to decrease pain and improve lumbar strength and flexibility. STG: (to be met in 6 treatments)  1. ? Pain: Patient will report pain as 0/10   2. ? ROM: Patient will improve thoracic ROM to 100% in order to increase ease of performing ADLs   3. ? Function: Patient will report being able to stand up to 30 min in order to prep meals with ease  4. Patient to be independent with home exercise program as demonstrated by performance with correct form without cues. 5. Demonstrate Knowledge of fall prevention  LTG: (to be met in 12 treatments)  1. Patient will report NDI as 10% functionally impaired to indicate improved overall quality of life  2. Patient will improve (B) LE strength to 4+/5 in order to improve standing tolerance  3. Patient will improve (B) UE strength to 4+/5 in order to improve driving tolerance  4. Patient will report 2/10 pain at the worst         Patient goals: \"Be able to complete Yoga training without pain\"    Pt.  Education:  [x] Yes  [] No  [] Reviewed Prior HEP/Ed  Method of Education: [x] Verbal  [x] Demo  [x] Written  Comprehension of Education:  [x] Verbalizes understanding. [x] Demonstrates understanding. [] Needs review. [] Demonstrates/verbalizes HEP/Ed previously given. Plan: [x] Continue current frequency toward long and short term goals.     [] Specific Instructions for subsequent treatments:       Time In:4:00pm            Time Out: 4:45pm    Electronically signed by:  Milly Mendez PTA

## 2021-12-17 ENCOUNTER — HOSPITAL ENCOUNTER (OUTPATIENT)
Dept: PHYSICAL THERAPY | Facility: CLINIC | Age: 66
Setting detail: THERAPIES SERIES
Discharge: HOME | End: 2021-12-17
Payer: MEDICARE

## 2021-12-17 PROCEDURE — 97110 THERAPEUTIC EXERCISES: CPT

## 2021-12-17 NOTE — FLOWSHEET NOTE
[] [facility]) [facility] &  Therapy  [address]  [phone]  F: [phone] [] [address]  [facility]   [address]  P: [phone]  F: [phone] [x] [address] &  Therapy  [address]  P: [phone]  F: [phone] [] [address]  P: [phone]  F: [phone] [] [address]  [facility]   [address]   [place]: [phone]  F: [phone]      Physical Therapy Daily Treatment Note    Date:  2021  Patient Name:  Magi Gipson    :  1955  MRN: [id number]   Physician: Leonel Mohr CNP                                Insurance: Cleotis Night (visits BMN)  Medical Diagnosis:   M54.6 (ICD-10-CM) - Acute left-sided thoracic back pain   R10.9 (ICD-10-CM) - Left flank pain      Rehab Codes: M54.6, M54.5, M62.81, R20.2   Onset Date: 21                 Next 's appt.: PRN    Visit# / total visits: 3/12 (2x wk)                  Cancels/No Shows:     Subjective:    Pain:  [x] Yes  [] No Location: (L) Thoracic spine and (L) flank pain   Pain Rating: (0-10 scale) 3/10  Pain altered Tx:  [x] No  [] Yes  Action:  Comments: Pt notes overall her back is feeling better.      Objective:  Modalities:   Precautions:  Exercises:  Exercise Reps/ Time Weight/ Level Comments    Nustep  9  L3   x              HS S  3x20\"    supine Rope x   Figure 4 S  3x30\"     x   Modified Luis Alfredo S  3x30\"        Quadruped thoracic rotation 15x   x   Thread the Needle nithya 15x     x   Prayer S 3-ways 3x20\"   x   Piriformis S 3x30\"      Thoracic / on foam roller 3'  Hands behind head  x              Bridges  15x5\"     x   Clamshells  15     x   SL Hip abd  15     x          Shoulder abd, flexion x10 ea  2#  x   ER x15 lime  x   Elbow / X89 lime  x          3 way hip  x15 lime  x   Leg ext x15 20#  x   Leg curl x15 25# Inc resistance next visit x   Other:     Specific Instructions for next treatment: Progress core stability, (B) hip strengthening, Thoracic/lumbar mobility ex       Treatment Charges: Mins Units   []  Modalities     [x]  Ther Exercise 45 3   []  Manual Therapy     []  Ther Activities     []  Aquatics     []  Vasocompression     []  Other     Total Treatment time 45 3       Assessment: [x] Progressing toward goals. Cont with nustep warm up, stretching and mat program as noted above. Able to inc progressed thoracic mobility. Attempted seated SB roll outs for LB and SB prayer pose for thoracic extension - pt noted feeling these ex more in shoulders than intended target, held further reps. Progressed UE/LE program with focus on UE/LE strength deficits and inc standing lex. Pt noted some mm fatigue towards end of tx.      [] No change. [] Other:  [] Patient would continue to benefit from skilled physical therapy services in order to decrease pain and improve lumbar strength and flexibility. STG: (to be met in 6 treatments)  1. ? Pain: Patient will report pain as 0/10   2. ? ROM: Patient will improve thoracic ROM to 100% in order to increase ease of performing ADLs   3. ? Function: Patient will report being able to stand up to 30 min in order to prep meals with ease  4. Patient to be independent with home exercise program as demonstrated by performance with correct form without cues. 5. Demonstrate Knowledge of fall prevention  LTG: (to be met in 12 treatments)  1. Patient will report NDI as 10% functionally impaired to indicate improved overall quality of life  2. Patient will improve (B) LE strength to 4+/5 in order to improve standing tolerance  3. Patient will improve (B) UE strength to 4+/5 in order to improve driving tolerance  4. Patient will report 2/10 pain at the worst         Patient goals:  \"Be able to complete Yoga training without pain\"    Pt. Education:  [x] Yes  [] No  [] Reviewed Prior HEP/Ed  Method of Education: [x] Verbal  [x] Demo  [x] Written  Comprehension of Education:  [x] Verbalizes understanding. [x] Demonstrates understanding. [] Needs review. [] Demonstrates/verbalizes HEP/Ed previously given. Plan: [x] Continue current frequency toward long and short term goals.     [] Specific Instructions for subsequent treatments:       Time In: 2:00 pm            Time Out: 2:55 pm    Electronically signed by:  Abraham Loyd PTA

## 2021-12-21 ENCOUNTER — TELEPHONE (OUTPATIENT)
Dept: PRIMARY CARE CLINIC | Age: 66
End: 2021-12-21

## 2021-12-21 ENCOUNTER — HOSPITAL ENCOUNTER (OUTPATIENT)
Dept: PHYSICAL THERAPY | Facility: CLINIC | Age: 66
Setting detail: THERAPIES SERIES
Discharge: HOME | End: 2021-12-21
Payer: MEDICARE

## 2021-12-21 DIAGNOSIS — M25.511 RIGHT SHOULDER PAIN, UNSPECIFIED CHRONICITY: Primary | ICD-10-CM

## 2021-12-21 DIAGNOSIS — M25.521 RIGHT ELBOW PAIN: ICD-10-CM

## 2021-12-21 PROCEDURE — 97164 PT RE-EVAL EST PLAN CARE: CPT

## 2021-12-21 PROCEDURE — 97110 THERAPEUTIC EXERCISES: CPT

## 2021-12-21 NOTE — TELEPHONE ENCOUNTER
Writer notified pt. Of the updated referral being placed for her Physical therapy. Pt. Verbalized understanding. *  Writer contacted the physical therapy office and verified they were able to access the new referral;they stated they had access to the order, and they were able to continue scheduling the pt.

## 2021-12-21 NOTE — PROGRESS NOTES
Physical Therapy      [] [facility]) - [facility] &  Therapy  [address]  [phone]  F: [phone] [] [address] [facility] 36   [address]  P: [phone]  F: [phone] [x] [address]  [facility]patient [facility] &  Therapy  [address]  P: [phone]  F: [phone] [] [address]  P: [phone]  F: [phone] [] [address]  [facility]   [address]   [place]: [phone]  F: [phone]      Physical Therapy Upper Extremity Evaluation    Date:  2021  Patient: Mayra Martin   : 1955  MRN: [id number]  Physician: Kristina Castellon CNP                                Insurance: Janiya Sanz (visits BMN)  Medical Diagnosis:   M54.6 (ICD-10-CM) - Acute left-sided thoracic back pain   R10.9 (ICD-10-CM) - Left flank pain     M25.511 (ICD-10-CM) - Right shoulder pain, unspecified chronicity   M25.521 (ICD-10-CM) - Right elbow pain       Rehab Codes: M54.6, M54.5, M62.81, R20.2, M25.511, M25.611, R29.3, M25.311, M25.521   Onset Date: 21 (thoracic pain) 2020 (shoulder pain)                  Next 's appt.: PRN     Subjective:   CC/HPI: Patient reports to physical therapy with (R) shoulder and (R) elbow pain. Patient reports that about 2 years ago she began to have increased pain in (R) shoulder that came on insidiously. Patient states that she received injections in (R) shoulder for pain in spring of 2020 and stopped in May of 2021. Patient states that injections were helpful for a short amount of time, however injections were affecting blood sugar levels and did not decrease pain long term. States that pain progressed and then patient started to notice (R) elbow pain about 2 months ago.  Patient reports that she called and talks with Independent  []? Assist [x]? Independent  []? Assist Standing to meal prep is difficult         Pain:  [x]? Yes  []? No  Location: (R) Shoulder, (R) Elbow      Pain Rating: (0-10 scale) (R) Shoulder: 0/10 at rest, 8/10 with lifting, (R) elbow:  5/10 at rest, 10/10 with lifting   Pain altered Tx:  []? Yes  [x]? No  Action:     Symptoms:  []? Improving     [x]? Worsening  []? Same  Better:  []? AM    []? PM    []? Sit    []? Rise/Sit    []? Stand    []? Walk    []? Lying    [x]? Other: Heat, ice, Biofreeze, Motrin  Worse: []? AM    []? PM    [x]? Sit    []? Rise/Sit    []? Stand    []? Walk    []? Lying    []? Bend                      []? Valsalva    [x]? Other: Lifting, extending elbow  Sleep: [x]? OK    []? Disturbed   Objective:     ROM  °A/P END FEEL STRENGTH    Left Right  Left Right   Sit Shld Flex        Sit Shld Abd        Sit Shld IR        Shoulder Flex 150 125  5 4-   Ext         140  5 4-   ER @ 0 45 90    5 3+   IR    5 4-   Supraspinatus    5 4-   Infraspinatus    5 3+   Serratus Ant    5 5   Mid Trap    3 3   Lower Trap    3 3   Elbow Flex. [facility] WFL  4+ 4+   Elbow Ext.  [facility] WFL  4+ 3+   Wrist Flexion 22 50  5 5   Wrist extension 55 45  5 3       OBSERVATION No Deficit Deficit Not Tested Comments   Forward Head [] [x] []    Rounded Shoulders [] [x] []    Kyphosis [] [x] [] Decreased   Scapular Height/Position [] [x] [] (R) sholder elevated, (R) scapula upwardly rot   Winging [x] [] []    Scapulohumeral Rhythm [] [x] []    INSPECTION/PALPATION       SC/AC Joint [] [x] [] TTP at (R) AC joint   Supraspinatus [x] [] []    Biceps tendon/groove [x] [] []    Posterior shld [] [x] [] TTP at (R) medial border of scapula   Subscapularis [x] [] []    Elbow  [] [x] [] TTP at (R) lateral epicondyle    NEUROLOGICAL       Cervical ROM/Quadrant [] [x] [] Flexion: 45 deg, Extension: 40 deg (B) SB: 25 deg, (L) rot: 60 deg, (R) Rot: 55 deg   Reflexes [] [] [x]    Compression/Distraction [] [] [x]    Sensation [x] [] [] Comments:    Assessment:  Patient is a 74year old female who presents to physical therapy with (R) shoulder and (R) elbow pain. Patient demonstrates impairments in pain tolerance, (R) shoulder ROM, cervical ROM, (R) wrist ROM, (R) UE strength and (R) scapular strength. These impairments affect the patient's ability to perform ADLs, household related tasks and work related tasks without increased pain. Patient would benefit from skilled physical therapy in order to improve impairments and overall quality of life. Educated patient on evaluation findings and poc. Educated patient on anatomy of shoulder and importance of scapular strength to improve posture and overhead mobility of shoulder. Educated patient on anatomy of elbow and potential for friction between extensor musculature causing increased pain in elbow. Patient verbalizes good understanding of education at this time. Problems:    [x] ? Pain:  [x] ? ROM:  [x] ? Strength:  [x] ? Function:  [] Other:      STG: (to be met in 6 treatments)  1. ? Pain: Patient will report pain as 5/10 with lifting objects   2. ? ROM: Patient will improve (R) shoulder elevation to 160 deg in order to increase ease of performing ADLs  3. Patient will improve wrist flexibility and AROM to equal (B) in order to increase ease of driving for work  4. Patient will improve (B) cervical SB to 30 deg in order to indicate improved upper trapezius length and improve scapulohumeral rhythm  5. Patient to be independent with home exercise program as demonstrated by performance with correct form without cues. LTG: (to be met in 12 treatments)  1. Patient will improve (B) scapular strength to 4+/5 in order to increase ease of performing overhead tasks  2. Patient will improve (B) UE strength to 4+/5 in order to increase ease of performing lifting activities   3. Patient will report pain as 2/10 with lifting objects                   Patient goals:  \"Decrease pain and not have to get another shot\"    Rehab Potential:  [x] Good  [] Fair  [] Poor   Suggested Professional Referral:  [x] No  [] Yes:  Barriers to Goal Achievement:  [x] No  [] Yes:  Domestic Concerns:  [x] No  [] Yes:    Pt. Education:  [x] Plans/Goals, Risks/Benefits discussed  [x] Home exercise program  Access Code: [email or web address]  URL: [employer]/  Date: 12/21/2021  Prepared by: Mariangel Mckeon    Exercises  Seated Gentle Upper Trapezius Stretch - 2 x daily - 7 x weekly - 3 sets - 30 sec hold  Gentle Levator Scapulae Stretch - 2 x daily - 7 x weekly - 3 sets - 30 sec hold  Doorway Pec Stretch at 90 Degrees Abduction - 2 x daily - 7 x weekly - 3 sets - 30 sec hold  Shoulder Flexion Wall Slide with Towel - 2 x daily - 7 x weekly - 3 sets - 10 reps  Standing Shoulder Abduction Slides at Wall - 2 x daily - 7 x weekly - 3 sets - 10 reps  Supine Shoulder Flexion Extension AAROM with Dowel - 2 x daily - 7 x weekly - 3 sets - 10 reps  Supine Shoulder Abduction AAROM with Dowel - 2 x daily - 7 x weekly - 3 sets - 10 reps  Supine Shoulder External Rotation with Dowel - 2 x daily - 7 x weekly - 3 sets - 10 reps  Seated Scapular Retraction - 2 x daily - 7 x weekly - 3 sets - 10 reps - 5 sec hold  Seated Wrist Flexion Stretch - 2 x daily - 7 x weekly - 3 sets - 30 sec hold  Seated Wrist Extension Stretch - 2 x daily - 7 x weekly - 3 sets - 30 sec hold  Wrist Extension AROM - 2 x daily - 7 x weekly - 3 sets - 10 reps  Wrist Flexion AROM - 2 x daily - 7 x weekly - 3 sets - 10 reps    Method of Education: [x] Verbal  [x] Demo  [x] Written  Comprehension of Education:  [x] Verbalizes understanding. [x] Demonstrates understanding. [x] Needs Review. [] Demonstrates/verbalizes understanding of HEP/Ed previously given.     Treatment Plan:  [x] Therapeutic Exercise   60334  [] Iontophoresis: 4 mg/mL Dexamethasone Sodium Phosphate  mAmin  63190   [x] Therapeutic Activity  38703 [x] Vasopneumatic cold with compression  57209    [] Gait Training   43505 [x] Ultrasound   [id number]   [] Neuromuscular Re-education  [phone] [] Electrical Stimulation Unattended  80682   [x] Manual Therapy  67674 [] Electrical Stimulation Attended  [id number]   [x] Instruction in HEP  [] Lumbar/Cervical Traction  [id number]   [] Aquatic Therapy   [id number] [x] Cold/hotpack    [] Massage   18048      [] Dry Needling, 1 or 2 muscles  28803   [] Biofeedback, first 15 minutes   16457  [] Biofeedback, additional 15 minutes   28947 [] Dry Needling, 3 or more muscles  55507     [x]  Medication allergies reviewed for use of    Dexamethasone Sodium Phosphate 4mg/ml     with iontophoresis treatments. Pt is not allergic. Frequency: 2 x/week for 12 visits    Todays Treatment:  Modalities:   Precautions:  Exercises:  Exercise Reps/ Time Weight/ Level Comments   UBE      Pulleys            UT S      Levator Scap S      Doorway Pec S      Wrist Flexion S 30\"x3     Wrist Ext S 30\"x3           Wall Slides   Flex, Abd   Wand S            Scaular Retractions      Other:    Specific Instructions for next treatment:      Evaluation Complexity:  History (Personal factors, comorbidities) [x] 0 [] 1-2 [] 3+   Exam (limitations, restrictions) [x] 1-2 [] 3 [] 4+   Clinical presentation (progression) [x] Stable [] Evolving  [] Unstable   Decision Making [x] Low [] Moderate [] High    [x] Low Complexity [] Moderate Complexity [] High Complexity       Treatment Charges: Mins Units   [x] Re-Evaluation 40 1   []  Modalities     [x]  Ther Exercise 8 1   []  Manual Therapy     []  Ther Activities     []  Aquatics     []  Vasocompression     []  Other       TOTAL TREATMENT TIME: 48 min    Time in: 3:08 pm    Time Out: 4:00 pm    Electronically signed by: Adriano Cano PT        Physician Signature:________________________________Date:__________________  By signing above or cosigning this note, I have reviewed this plan of care and certify a need for medically necessary rehabilitation services.      *PLEASE SIGN ABOVE AND FAX BACK ALL PAGES*

## 2021-12-21 NOTE — TELEPHONE ENCOUNTER
Patient called in stating she has been getting physical therapy for her back but she is asking if another referral can be placed for her right shoulder and her right elbow    Patient notified provider is not in the office on Tuesdays and will respond sometime today or tomorrow when she is available    Patient verbalized understanding

## 2021-12-23 ENCOUNTER — HOSPITAL ENCOUNTER (OUTPATIENT)
Dept: PHYSICAL THERAPY | Facility: CLINIC | Age: 66
Setting detail: THERAPIES SERIES
Discharge: HOME | End: 2021-12-23
Payer: MEDICARE

## 2021-12-23 PROCEDURE — 97016 VASOPNEUMATIC DEVICE THERAPY: CPT

## 2021-12-23 PROCEDURE — 97110 THERAPEUTIC EXERCISES: CPT

## 2021-12-23 PROCEDURE — 97140 MANUAL THERAPY 1/> REGIONS: CPT

## 2021-12-23 NOTE — FLOWSHEET NOTE
[] [facility]  [address]  [phone]  F: [phone] [] [address]  [facility] 36   [address]  P: [phone]  F: [phone] [] [address]  [address] [facility]  P: [phone]  F: [phone] [] [address]: [phone]  F: [phone] [] [address]  [facility]   [address]   [place]: [phone]  F: [phone]      Physical Therapy Daily Treatment Note    Date:  2021  Patient Name:  Cuong Becerra    :  1955  MRN: [id number]   [address], CNP                                Insurance: [employer] (visits BMN)  Medical Diagnosis:            M25.511 (ICD-10-CM) - Right shoulder pain, unspecified chronicity   M25.521 (ICD-10-CM) - Right elbow pain   Rehab Codes: M54.6, M54.5, M62.81, R20.2, M25.511, M25.611, R29.3, M25.311, M25.521 Onset Date: 21 (thoracic pain) 2020 (shoulder pain)                  Next 's appt.: PRN     Visit# / total visits: 2/12 (2x wk)      Cancels/No Shows:   ALTERNATE BETWEEN SCRIPTS   Subjective:    Pain:  [x] Yes  [] No Location: R shoulder, R elbow  Pain Rating: (0-10 scale) R) Shoulder: 5/10 at rest,  (R) elbow:  8/10 at rest,   Pain altered Tx:  [x] No  [] Yes  Action:  Comments: Pt states a lot pain in her R elbow on arrival and mod pain in R shoulder.      Objective:  Modalities:   Precautions:  Exercises:  Exercise Reps/ Time Weight/ Level Comments   UBE  6'       Pulleys  3/3    Flex/scaption             UT S 3x30\"       Levator Scap S 3x30\"       Doorway Pec S 3x30\"       Wrist Flexion S 30\"x3       Wrist Ext S 30\"x3                 Wall Slides  10x3\"   Flex, Abd   Wand S  10x5\"    flex, abd, ER             Scaular Retractions  10x5\"       Other:     Specific Instructions for next treatment:      Treatment Charges: Mins Units   [x]  Modalities     [x]  Ther Exercise 25 1   [x]  Manual Therapy 8 1   []  Ther Activities     []  Aquatics     [x]  Vasocompression 15 1   []  Other     Total Treatment time 48 3       Assessment: [x] Progressing toward goals. Initiated with UBE for gentle warm up of R shoulder and R elbow which was deemed lex by pt. Ed pt on wand AAROM stretches with max abd range of 85 degrees without increase pain, flexion and ER were completed in full ROM. Added DI and MFR to lateral epicondyle and wrist extensor with good carryover post. Ended with vaso to R shoulder and CP to R elbow for pain management. [] No change. [] Other:  [x] Patient would continue to benefit from skilled physical therapy services in order to:     STG: (to be met in 6 treatments)  1. ? Pain: Patient will report pain as 5/10 with lifting objects   2. ? ROM: Patient will improve (R) shoulder elevation to 160 deg in order to increase ease of performing ADLs  3. Patient will improve wrist flexibility and AROM to equal (B) in order to increase ease of driving for work  4. Patient will improve (B) cervical SB to 30 deg in order to indicate improved upper trapezius length and improve scapulohumeral rhythm  5. Patient to be independent with home exercise program as demonstrated by performance with correct form without cues. LTG: (to be met in 12 treatments)  1. Patient will improve (B) scapular strength to 4+/5 in order to increase ease of performing overhead tasks  2. Patient will improve (B) UE strength to 4+/5 in order to increase ease of performing lifting activities   3. Patient will report pain as 2/10 with lifting objects                    Patient goals: \"Decrease pain and not have to get another shot\"    Pt.  Education:  [x] Yes  [] No  [] Reviewed Prior HEP/Ed  Method of Education: [x] Verbal  [x] Demo  [x] Written  Comprehension of Education:  [x] Verbalizes understanding. [x] Demonstrates understanding. [x] Needs review. [] Demonstrates/verbalizes HEP/Ed previously given. Plan: [x] Continue current frequency toward long and short term goals.     [] Specific Instructions for subsequent treatments:       Time In: 4:00pm                Time Out: 4:55pm    Electronically signed by:  Mariana Crowell PTA

## 2021-12-27 ENCOUNTER — HOSPITAL ENCOUNTER (OUTPATIENT)
Dept: PHYSICAL THERAPY | Facility: CLINIC | Age: 66
Setting detail: THERAPIES SERIES
Discharge: HOME | End: 2021-12-27
Payer: MEDICARE

## 2021-12-27 PROCEDURE — 97110 THERAPEUTIC EXERCISES: CPT

## 2021-12-27 PROCEDURE — 97140 MANUAL THERAPY 1/> REGIONS: CPT

## 2021-12-27 NOTE — FLOWSHEET NOTE
[] [facility]  [address]  [phone]  F: [phone] [] [address]  [facility]   [address]  P: [phone]  F: [phone] [] [place]  [address] [facility]  P: [phone]  F: [phone] [] [address]: [phone]  F: [phone] [] [address]  [facility]   [address]   [place]: [phone]  F: [phone]      Physical Therapy Daily Treatment Note    Date:  2021  Patient Name:  Junaid Ritchie    :  1955  MRN: [id number]   [address], CNP                                Insurance: [employer] (visits BMN)  Medical Diagnosis:            M25.511 (ICD-10-CM) - Right shoulder pain, unspecified chronicity   M25.521 (ICD-10-CM) - Right elbow pain   Rehab Codes: M54.6, M54.5, M62.81, R20.2, M25.511, M25.611, R29.3, M25.311, M25.521 Onset Date: 21 (thoracic pain) 2020 (shoulder pain)                  Next 's appt.: PRN     Visit# / total visits: 2/12 (2x wk)      Cancels/No Shows:   ALTERNATE BETWEEN SCRIPTS   Subjective:    Pain:  [x] Yes  [] No Location: R shoulder, R elbow  Pain Rating: (0-10 scale) R) Shoulder: 5/10 at rest,  (R) elbow:  8/10 at rest,   Pain altered Tx:  [x] No  [] Yes  Action:  Comments: Pt states a lot pain in her R elbow on arrival and mod pain in R shoulder.      Objective:  Modalities:   Precautions:  Exercises:  Exercise Reps/ Time Weight/ Level Comments   UBE  6'       Pulleys  3/3    Flex/scaption             UT S 3x30\"       Levator Scap S 3x30\"       Doorway Pec S 3x30\"       Wrist Flexion S 30\"x3       Wrist Ext S 30\"x3                 Wall Slides  10x3\"   Flex, Abd   Wand S  10x5\"    flex, abd, ER             Prone Scaular Retractions  10x5\"       Prone Scapular Retractions + Depression + shoulder extension (lift hand off mat) 10x5''     Other:     Specific Instructions for next treatment:      Treatment Charges: Mins Units   [x]  Modalities: MHP 10 -   [x]  Ther Exercise 10 1   [x]  Manual Therapy 20 2   []  Ther Activities     []  Aquatics     []  Vasocompression     []  Other     Total Treatment time 40 3       Assessment: [x] Progressing toward goals. Held warm-up this date d/t necessity - strictly manual focus this date to decr tension and pain. UT DI, MFR to wrist extensors and wrist flexors during R shoulder depression stretch hold. Able to achieve full ROM into abduction and ER. Pt reports decr pain and ability to lift RUE. Prone scapular strengthening added this date to encourage appr posture. [] No change. [] Other:  [x] Patient would continue to benefit from skilled physical therapy services in order to:     STG: (to be met in 6 treatments)  1. ? Pain: Patient will report pain as 5/10 with lifting objects   2. ? ROM: Patient will improve (R) shoulder elevation to 160 deg in order to increase ease of performing ADLs  3. Patient will improve wrist flexibility and AROM to equal (B) in order to increase ease of driving for work  4. Patient will improve (B) cervical SB to 30 deg in order to indicate improved upper trapezius length and improve scapulohumeral rhythm  5. Patient to be independent with home exercise program as demonstrated by performance with correct form without cues. LTG: (to be met in 12 treatments)  1. Patient will improve (B) scapular strength to 4+/5 in order to increase ease of performing overhead tasks  2. Patient will improve (B) UE strength to 4+/5 in order to increase ease of performing lifting activities   3. Patient will report pain as 2/10 with lifting objects                    Patient goals: \"Decrease pain and not have to get another shot\"    Pt.  Education:  [x] Yes  [] No  [] Reviewed Prior HEP/Ed  Method of Education: [x] Verbal  [x] Demo  [x] Written  Comprehension of Education:  [x] Verbalizes understanding. [x] Demonstrates understanding. [x] Needs review. [] Demonstrates/verbalizes HEP/Ed previously given. Plan: [x] Continue current frequency toward long and short term goals.     [] Specific Instructions for subsequent treatments:       Time In: 1200                Time Out: 1240     Electronically signed by:  Luis Miguel Kyle PTA

## 2021-12-29 ENCOUNTER — HOSPITAL ENCOUNTER (OUTPATIENT)
Dept: PHYSICAL THERAPY | Facility: CLINIC | Age: 66
Setting detail: THERAPIES SERIES
Discharge: HOME | End: 2021-12-29
Payer: MEDICARE

## 2021-12-29 PROCEDURE — 97110 THERAPEUTIC EXERCISES: CPT

## 2021-12-29 NOTE — FLOWSHEET NOTE
[] [facility] &  Therapy  [address]  [phone]  F: [phone] [] [address]  [facility] 36   [address]  P: [phone]  F: [phone] [x] [address]   Outpatient Rehabilitation &  Therapy  [address]  P: [phone]  F: [phone] [] [address]  P: [phone]  F: [phone] [] [address]  [facility]   [address]   [place]: [phone]  F: [phone]      Physical Therapy Daily Treatment Note    Date:  2021  Patient Name:  Severo Urbano    :  1955  MRN: [id number]   Physician: Haritha Odom CNP                                Insurance: [employer] (visits BMN)  Medical Diagnosis:   M54.6 (ICD-10-CM) - Acute left-sided thoracic back pain   R10.9 (ICD-10-CM) - Left flank pain      Rehab Codes: M54.6, M54.5, M62.81, R20.2   Onset Date: 21                 Next 's appt.: PRN  ALTERNATE BETWEEN SCRIPTS   Visit# / total visits:  (2x wk)                  Cancels/No Shows:     Subjective:    Pain:  [x] Yes  [] No Location: (L) Thoracic spine and (L) flank pain   Pain Rating: (0-10 scale) 4/10 L thoracic spine, 5/10 L flank  Pain altered Tx:  [x] No  [] Yes  Action:  Comments: Pt states \"has not been that bad\" but reported was painful last night and has to modify her sleeping habits. She stated is a side sleeper but becomes painful on her L side \" So I will have to half be on my side and half on my back for comfort\".      Objective:  Modalities:   Precautions:  Exercises:  Exercise Reps/ Time Weight/ Level Comments    Nustep  8  L3   x              HS S  3x20\"    supine Rope x   Figure 4 S  3x30\"     x   Modified Luis Alfredo S  3x30\"        Quadruped thoracic rotation 15x   x   Thread the Needle nithya 15x     x Prayer S 3-ways 3x20\"   x   Piriformis S 3x30\"      Thoracic / on foam roller 3'  Hands behind head  x              Bridges  15x5\"  YTB  added TB 12/29 x   Clamshells  20    increased reps 12/29 x   SL Hip abd  20    increased reps 12/29 x          TB       HAB  2x10 OTB Added 12/29 x   Rows  2x10 YTB Added 12/29 x          ER x15 lime  --   Elbow / x15 lime  --                 pallof  rotation  2x10 Yellow  Added 12/29 increase to red next tx  x   TG: squats  2x10 18 Added 12/29 x   3 way hip  x15 lime  x   Leg ext x15 20#     Leg curl x15 25# Inc resistance next visit    Other:     Specific Instructions for next treatment: Progress core stability, (B) hip strengthening, Thoracic/lumbar mobility ex       Treatment Charges: Mins Units   []  Modalities     [x]  Ther Exercise 45 3   []  Manual Therapy     []  Ther Activities     []  Aquatics     []  Vasocompression     []  Other     Total Treatment time 45 3       Assessment: [x] Progressing toward goals. Progressed regiment as listed above for continued strengthening with in pt tolerance. Added TB rows and HAB to improve thoracic spine and postural strengthening, tactile cues and ed to avoid compensations. Attempted pallof press but pt verbalized R elbow pain with the consistent flexion and extension. Modified with elbow at 90 degrees and trunk rotation with improved response from pt. Concluded with stretches for max benefit in tissue release with noted improvement post.       [] No change. [] Other:  [] Patient would continue to benefit from skilled physical therapy services in order to decrease pain and improve lumbar strength and flexibility. STG: (to be met in 6 treatments)  1. ? Pain: Patient will report pain as 0/10   2. ? ROM: Patient will improve thoracic ROM to 100% in order to increase ease of performing ADLs   3. ? Function: Patient will report being able to stand up to 30 min in order to prep meals with ease  4.  Patient to be independent with home exercise program as demonstrated by performance with correct form without cues. 5. Demonstrate Knowledge of fall prevention  LTG: (to be met in 12 treatments)  1. Patient will report NDI as 10% functionally impaired to indicate improved overall quality of life  2. Patient will improve (B) LE strength to 4+/5 in order to improve standing tolerance  3. Patient will improve (B) UE strength to 4+/5 in order to improve driving tolerance  4. Patient will report 2/10 pain at the worst         Patient goals: \"Be able to complete Yoga training without pain\"    Pt. Education:  [x] Yes  [] No  [] Reviewed Prior HEP/Ed  Method of Education: [x] Verbal  [x] Demo  [x] Written  Comprehension of Education:  [x] Verbalizes understanding. [x] Demonstrates understanding. [] Needs review. [] Demonstrates/verbalizes HEP/Ed previously given. Plan: [x] Continue current frequency toward long and short term goals.     [] Specific Instructions for subsequent treatments:       Time In: 12:00 pm            Time Out: 12:55 pm    Electronically signed by:  Gualberto Paul PTA

## 2022-01-03 ENCOUNTER — HOSPITAL ENCOUNTER (OUTPATIENT)
Dept: PHYSICAL THERAPY | Facility: CLINIC | Age: 67
Setting detail: THERAPIES SERIES
Discharge: HOME | End: 2022-01-03
Payer: MEDICARE

## 2022-01-03 PROCEDURE — 97110 THERAPEUTIC EXERCISES: CPT

## 2022-01-03 PROCEDURE — 97140 MANUAL THERAPY 1/> REGIONS: CPT

## 2022-01-03 PROCEDURE — 97016 VASOPNEUMATIC DEVICE THERAPY: CPT

## 2022-01-03 NOTE — FLOWSHEET NOTE
[] [address] [facility] [address]  Outpatient Rehabilitation &  Therapy  [address]  [phone]  F: [phone] [] [address]  [employer] 36   [address]  P: [phone]  F: [phone] [x] [address] &  Therapy  [address]  P: [phone]  F: [phone] [] [address]  P: [phone]  F: [phone] [] [address]  [facility]   [address]   [place]: [phone]  F: [phone]      Physical Therapy Daily Treatment Note    Date:  1/3/2022  Patient Name:  Hannah Vincent    :  1955  MRN: [id number]   [address], CNP                                Insurance: [employer] (visits BMN)  Medical Diagnosis:   M25.511 (ICD-10-CM) - Right shoulder pain, unspecified chronicity   M25.521 (ICD-10-CM) - Right elbow pain   Rehab Codes: M54.6, M54.5, M62.81, R20.2, M25.511, M25.611, R29.3, M25.311, M25.521 Onset Date: 21 (thoracic pain) 2020 (shoulder pain)                  Next 's appt.: PRN  Visit# / total visits:  (2x wk)      Cancels/No Shows:   ALTERNATE BETWEEN SCRIPTS       Subjective:  Pt reporting to PT still rating higher level of pain in her elbow, as well as still having pain in her shoulder, but denies any changes since her last session.    Pain:  [x] Yes  [] No  Location: R shoulder, R elbow  Pain Rating: (0-10 scale) R) Shoulder: 5/10 at rest,  (R) elbow:  7/10 at rest,   Pain altered Tx:  [x] No  [] Yes  Action:  Comments:    Objective:  Modalities:   Precautions:  Exercises:  Exercise Reps/ Time Weight/ Level Comments    UBE  6'        Pulleys  3/3    Flex/scaption x              UT S 3x30\"     x   Levator Scap S 3x30\"     x   Doorway Pec S 3x30\"        Wrist Flexion S 30\"x3        Wrist Ext S 30\"x3     x Eccentric RD 2x10 2#  x   Eccentric Wrist ext 2x10 2#  x              Wall Slides  10x3\"   Flex, Abd    Wand S  10x5\"    flex, abd, ER               Prone Scaular Retractions  10x5\"        Prone Scapular Retractions + Depression + shoulder extension (lift hand off mat) 10x5''      Other: Hypervolt R wrist extensors, DI R wrist extensors near epicondyle     Specific Instructions for next treatment: Continue tx per POC      Treatment Charges: Mins Units   []  Modalities: MHP     [x]  Ther Exercise 21 1   [x]  Manual Therapy 14 1   []  Ther Activities     []  Aquatics     [x]  Vasocompression 15 1   []  Other     Total Treatment time 50 3       Assessment: [x] Progressing toward goals. Continued with tx per log with good tolerance throughout session. Help warm up on UBE and completed pulleys and stretches instead to promote better flexibility. Continued with manual as well per log to help decrease pain and improve tension, with pt reporting increased tenderness during completion, however tolerated performance throughout session. Added eccentric wrist extension and RD to program today with pt tolerating well and denying increased pain with completion. Ended session with vaso to help reduce inflammation and pain. Pt reports slight increase in pain at 8/10 following session. [] No change. [] Other:  [x] Patient would continue to benefit from skilled physical therapy services in order to: improve impairments and overall quality of life    STG: (to be met in 6 treatments)  1. ? Pain: Patient will report pain as 5/10 with lifting objects   2. ? ROM: Patient will improve (R) shoulder elevation to 160 deg in order to increase ease of performing ADLs  3. Patient will improve wrist flexibility and AROM to equal (B) in order to increase ease of driving for work  4. Patient will improve (B) cervical SB to 30 deg in order to indicate improved upper trapezius length and improve scapulohumeral rhythm  5.  Patient to be independent with home exercise program as demonstrated by performance with correct form without cues. LTG: (to be met in 12 treatments)  1. Patient will improve (B) scapular strength to 4+/5 in order to increase ease of performing overhead tasks  2. Patient will improve (B) UE strength to 4+/5 in order to increase ease of performing lifting activities   3. Patient will report pain as 2/10 with lifting objects                    Patient goals: \"Decrease pain and not have to get another shot\"    Pt. Education:  [x] Yes  [] No  [x] Reviewed Prior HEP/Ed  Method of Education: [x] Verbal  [x] Demo  [] Written  Comprehension of Education:  [x] Verbalizes understanding. [x] Demonstrates understanding. [x] Needs review. [] Demonstrates/verbalizes HEP/Ed previously given. Plan: [x] Continue current frequency toward long and short term goals. [x] Specific Instructions for subsequent treatments: Continue tx per POC      Time In: 1430                Time Out: 9950     Electronically signed by:   Sirisha Gutierrez PT

## 2022-01-06 ENCOUNTER — HOSPITAL ENCOUNTER (OUTPATIENT)
Dept: PHYSICAL THERAPY | Facility: CLINIC | Age: 67
Setting detail: THERAPIES SERIES
Discharge: HOME | End: 2022-01-06
Payer: MEDICARE

## 2022-01-06 PROCEDURE — 97016 VASOPNEUMATIC DEVICE THERAPY: CPT

## 2022-01-06 PROCEDURE — 97110 THERAPEUTIC EXERCISES: CPT

## 2022-01-06 PROCEDURE — 97140 MANUAL THERAPY 1/> REGIONS: CPT

## 2022-01-06 NOTE — FLOWSHEET NOTE
[] [address]  Outpatient Rehabilitation &  Therapy  [address]  [phone]  F: [phone] [] [address]  [facility] 36   [address]  P: [phone]  F: [phone] [x] [address] &  Therapy  [address] [facility]  P: [phone]  F: [phone] [] [address]  P: [phone]  F: [phone] [] [address]  [facility]   [address]   [place]: [phone]  F: [phone]      Physical Therapy Daily Treatment Note    Date:  2022  Patient Name:  Madelyn Lino    :  1955  MRN: [id number]   [address], CNP                                Insurance: [employer] (visits BMN)  Medical Diagnosis:   M25.511 (ICD-10-CM) - Right shoulder pain, unspecified chronicity   M25.521 (ICD-10-CM) - Right elbow pain   Rehab Codes: M54.6, M54.5, M62.81, R20.2, M25.511, M25.611, R29.3, M25.311, M25.521 Onset Date: 21 (thoracic pain) 2020 (shoulder pain)                  Next 's appt.: PRN  Visit# / total visits:  (2x wk)      Cancels/No Shows:   ALTERNATE BETWEEN SCRIPTS       Subjective:  Pt stated thoracic and flank pain have subsided to only at night and would like to focus more on R shoulder and elbow.      Pain:  [x] Yes  [] No  Location: R shoulder, R elbow  Pain Rating: (0-10 scale) R) Shoulder: 6/10 at rest,  (R) elbow:  5/10 at rest,   Pain altered Tx:  [x] No  [] Yes  Action:  Comments:    Objective:  Modalities:   Precautions:  Exercises:  Exercise Reps/ Time Weight/ Level Comments    UBE  6'    held --   Pulleys  3/3    Flex/scaption x              UT S 3x30\"     x   Levator Scap S 3x30\"     x   Doorway Pec S 3x30\"        Wrist Flexion S 30\"x3        Wrist Ext S 30\"x3     x          Eccentric RD 2x10 2#  x   Eccentric Wrist ext 2x10 2# No weight 1/6 x              Wall Slides  10x3\"   Flex, Abd x   Wand S  10x5\"    flex, abd, ER x              Prone Scaular Retractions  10x5\"        Prone Scapular Retractions + Depression + shoulder extension (lift hand off mat) 10x5''   x   Other: 7. 5' hawk  , DI R wrist extensors near epicondyle               7.5' hawk  and TP release to R UT, levator.      Specific Instructions for next treatment: Continue tx per POC      Treatment Charges: Mins Units   []  Modalities: MHP     [x]  Ther Exercise 25 2   [x]  Manual Therapy 15 1   []  Ther Activities     []  Aquatics     [x]  Vasocompression 15 1   []  Other     Total Treatment time 55 4       Assessment: [x] Progressing toward goals. Initiated tx with pulleys d/t increasing complaint of pain with UBE. Resumed prone retractions, depressions, and extension with cues and ed for proper mechanics but no increase in pain. Discontinued hypervolt and trial of hawk  d/t complaint of elevated pain last tx. Pt lex moderate pressure with good skin response with noted improvement post. Initiated MF/TP release to R shoulder with several TP noted. Completed tx with continued vaso and CP for elbow for sxs relief. [] No change. [] Other:  [x] Patient would continue to benefit from skilled physical therapy services in order to: improve impairments and overall quality of life    STG: (to be met in 6 treatments)  1. ? Pain: Patient will report pain as 5/10 with lifting objects   2. ? ROM: Patient will improve (R) shoulder elevation to 160 deg in order to increase ease of performing ADLs  3. Patient will improve wrist flexibility and AROM to equal (B) in order to increase ease of driving for work  4. Patient will improve (B) cervical SB to 30 deg in order to indicate improved upper trapezius length and improve scapulohumeral rhythm  5.  Patient to be independent with home exercise program as demonstrated by performance with correct form without cues. LTG: (to be met in 12 treatments)  1. Patient will improve (B) scapular strength to 4+/5 in order to increase ease of performing overhead tasks  2. Patient will improve (B) UE strength to 4+/5 in order to increase ease of performing lifting activities   3. Patient will report pain as 2/10 with lifting objects                    Patient goals: \"Decrease pain and not have to get another shot\"    Pt. Education:  [x] Yes  [] No  [x] Reviewed Prior HEP/Ed  Method of Education: [x] Verbal  [x] Demo  [] Written  Comprehension of Education:  [x] Verbalizes understanding. [x] Demonstrates understanding. [x] Needs review. [] Demonstrates/verbalizes HEP/Ed previously given. Plan: [x] Continue current frequency toward long and short term goals.     [x] Specific Instructions for subsequent treatments: Continue tx per POC      Time In: 12:00pm                Time Out: 1:18pm     Electronically signed by:  Ana Zarate PTA

## 2022-01-10 ENCOUNTER — HOSPITAL ENCOUNTER (OUTPATIENT)
Dept: PHYSICAL THERAPY | Facility: CLINIC | Age: 67
Setting detail: THERAPIES SERIES
Discharge: HOME | End: 2022-01-10
Payer: MEDICARE

## 2022-01-10 PROCEDURE — 97110 THERAPEUTIC EXERCISES: CPT

## 2022-01-10 PROCEDURE — 97140 MANUAL THERAPY 1/> REGIONS: CPT

## 2022-01-10 NOTE — FLOWSHEET NOTE
[] [facility]) - [facility] &  Therapy  [address]  [phone]  F: [phone] [] [address]  [place] [address]   [address]  P: [phone]  F: [phone] [x] [address] &  Therapy  [address]  P: [phone]  F: [phone] [] [address]  P: [phone]  F: [phone] [] [address]  [facility]   [address]   [place]: [phone]  F: [phone]      Physical Therapy Daily Treatment Note    Date:  1/10/2022  Patient Name:  Iliana Dupree    :  1955  MRN: [id number]   [address], CNP                                Insurance: [place] Medicare (visits BMN)  Medical Diagnosis:   M25.511 (ICD-10-CM) - Right shoulder pain, unspecified chronicity   M25.521 (ICD-10-CM) - Right elbow pain   Rehab Codes: M54.6, M54.5, M62.81, R20.2, M25.511, M25.611, R29.3, M25.311, M25.521 Onset Date: 21 (thoracic pain) 2020 (shoulder pain)                  Next 's appt.: PRN  Visit# / total visits:  (2x wk)      Cancels/No Shows:   ALTERNATE BETWEEN SCRIPTS       Subjective:  Pt stated would like to continue focus on R shoulder and elbow d/t pain maintaining high.    Pain:  [x] Yes  [] No  Location: R shoulder, R elbow  Pain Rating: (0-10 scale) R) Shoulder: 6/10 at rest,  (R) elbow:  5-6/10 at rest,   Pain altered Tx:  [x] No  [] Yes  Action:  Comments:    Objective:  Modalities:   Precautions:  Exercises:  Exercise Reps/ Time Weight/ Level Comments    UBE  6'    held --   Pulleys  3/3    Flex/scaption x              UT S 3x30\"     x   Levator Scap S 3x30\"     x   Doorway Pec S 3x30\"        Wrist Flexion S 30\"x3        Wrist Ext S 30\"x3     x          Eccentric RD 2x10 2#  Held 1/10   Eccentric Wrist ext 2x10 2# No weight 1/6 Held 1/10              Wall Slides  10x3\"   Flex, Abd x   Wand S  10x5\"    flex, abd, ER x              Prone Scaular Retractions  10x5\"        Prone Scapular Retractions + Depression + shoulder extension (lift hand off mat) 10x5''   x   Other: 7. 5' hawk  , DI R wrist extensors near epicondyle               7.5' hawk  and TP release to R UT, levator. 1/10/22:   7.5' hawk  , DI R wrist extensors near epicondyle                 7.5 R shoulder PROM with distraction and grade 1-2 posterior mobs      Specific Instructions for next treatment: Continue tx per POC      Treatment Charges: Mins Units   []  Modalities: MHP     [x]  Ther Exercise 25 2   [x]  Manual Therapy 15 1   []  Ther Activities     []  Aquatics     []  Vasocompression held    []  Other     Total Treatment time 40 3       Assessment: [x] Progressing toward goals. Initiated tx with pulleys followed by rest of regiment. Continued pain in abd past 90 degrees. Held manual to R UT/levator today and substituted with PROM with shoulder distraction and grade 1-2 mobs to improve ROM joint lubrication. Pt demo improved range in abd with soft end feel at 160 degrees. Pt had a relief of pain with distraction and grade 2 mob that carried over to end of tx. Continued hawk  to lateral epicondyle and wrist extensors with noted improvement in pain post. Pt held on vaso this date and with ice at home d/t time restraint. Will continue manual and strengthening as pt lex. [] No change. [] Other:  [x] Patient would continue to benefit from skilled physical therapy services in order to: improve impairments and overall quality of life    STG: (to be met in 6 treatments)  1. ? Pain: Patient will report pain as 5/10 with lifting objects   2. ? ROM: Patient will improve (R) shoulder elevation to 160 deg in order to increase ease of performing ADLs  3.  Patient will improve wrist flexibility and AROM to equal (B) in order to increase ease of driving for work  4. Patient will improve (B) cervical SB to 30 deg in order to indicate improved upper trapezius length and improve scapulohumeral rhythm  5. Patient to be independent with home exercise program as demonstrated by performance with correct form without cues. LTG: (to be met in 12 treatments)  1. Patient will improve (B) scapular strength to 4+/5 in order to increase ease of performing overhead tasks  2. Patient will improve (B) UE strength to 4+/5 in order to increase ease of performing lifting activities   3. Patient will report pain as 2/10 with lifting objects                    Patient goals: \"Decrease pain and not have to get another shot\"    Pt. Education:  [x] Yes  [] No  [x] Reviewed Prior HEP/Ed  Method of Education: [x] Verbal  [x] Demo  [] Written  Comprehension of Education:  [x] Verbalizes understanding. [x] Demonstrates understanding. [x] Needs review. [] Demonstrates/verbalizes HEP/Ed previously given. Plan: [x] Continue current frequency toward long and short term goals.     [x] Specific Instructions for subsequent treatments: Continue tx per POC      Time In: 2:30pm                Time Out: 3:25pm     Electronically signed by:  Claudine Rao PTA

## 2022-01-12 ENCOUNTER — HOSPITAL ENCOUNTER (OUTPATIENT)
Dept: PHYSICAL THERAPY | Facility: CLINIC | Age: 67
Setting detail: THERAPIES SERIES
Discharge: HOME | End: 2022-01-12
Payer: MEDICARE

## 2022-01-12 PROCEDURE — 97110 THERAPEUTIC EXERCISES: CPT

## 2022-01-12 PROCEDURE — 97140 MANUAL THERAPY 1/> REGIONS: CPT

## 2022-01-12 NOTE — FLOWSHEET NOTE
[] [facility]) - [facility] &  Therapy  [address]  [phone]  F: [phone] [] [address]  [facility]   [address]  P: [phone]  F: [phone] [x] [address] &  Therapy  [address]  P: [phone]  F: [phone] [] [address]  P: [phone]  F: [phone] [] [address]  [facility]   [address]   [place]: [phone]  F: [phone]      Physical Therapy Daily Treatment Note    Date:  2022  Patient Name:  Pramod Ponce    :  1955  MRN: [id number]   [address], CNP                                Insurance: [employer] Medicare (visits BMN)  Medical Diagnosis:   M25.511 (ICD-10-CM) - Right shoulder pain, unspecified chronicity   M25.521 (ICD-10-CM) - Right elbow pain   Rehab Codes: M54.6, M54.5, M62.81, R20.2, M25.511, M25.611, R29.3, M25.311, M25.521 Onset Date: 21 (thoracic pain) 2020 (shoulder pain)                  Next 's appt.: PRN  Visit# / total visits:  (2x wk)      Cancels/No Shows:   ALTERNATE BETWEEN SCRIPTS       Subjective:  Continued to address R shoulder and elbow pain. Pt stated she felt good post session however relief only lasts \"until the end of the day\". Does complete HEP, although not \"100% consistent\".    Pain:  [x] Yes  [] No  Location: R shoulder, R elbow  Pain Rating: (0-10 scale) R) Shoulder: 5/10 at rest,  (R) elbow:  7/10 at rest,   Pain altered Tx:  [x] No  [] Yes  Action:  Comments:    Objective:  Modalities:   Precautions:  Exercises:  Exercise Reps/ Time Weight/ Level Comments    UBE  6'    held --   Pulleys  3/3    Flex/scaption x              UT S 3x30\"     x   Levator Scap S 3x30\"     x   Doorway Pec S 3x30\"        Wrist Flexion S 30\"x3     Wrist Ext S 30\"x3     x          Eccentric RD 2x10 2#  Held 1/10   Eccentric Wrist ext 2x10 2# No weight 1/6 Held 1/10              Wall Slides  10x3\"   Flex, Abd Held   Wand S  10x5\"    flex, abd, ER x              Prone Scaular Retractions  10x5\"        Prone Scapular Retractions + Depression + shoulder extension (lift hand off mat) 10x5''   Held   Other: 1/12/22:   Hawk  , DI R wrist extensors near epicondyle                              R shoulder PROM with distraction, Supine Pec Release                              Hypervolt to R UT/Levator                                   Specific Instructions for next treatment: Continue tx per POC      Treatment Charges: Mins Units   []  Modalities: MHP     [x]  Ther Exercise 18 1   [x]  Manual Therapy 25 2   []  Ther Activities     []  Aquatics     []  Vasocompression held    []  Other     Total Treatment time 43 3       Assessment: [x] Progressing toward goals. Began session with pulleys to allow for increased movement within the R shoulder; able to maintain comfortable end ranges. Followed with manual via Hawkgrips to R wrist extensors and lat epicondyle region as pt noted pain resides deep into the tissue, good relief noted. Applied PROM throughout all planes with reduced pain and increased range achieved with abduction. Mod tension palpated upon R pec, applied release to promote increased flexibility within the shoulder. Utilized Hypervolt to R UT and medial scapular border in attempt to reduce tightness with optimal relief verbalized. Pt completed stretching and therex as noted above, held noted exercises d/t time. Pt declined use of vaso this date as she reported she is feeling reduced pain compared to upon arrival. Encouraged daily compliance to HEP along with postural awareness when driving longer distances for work. [] No change.      [] Other:  [x] Patient would continue to benefit from skilled physical therapy services in order to: improve impairments and overall quality of life    STG: (to be met in 6 treatments)  1. ? Pain: Patient will report pain as 5/10 with lifting objects   2. ? ROM: Patient will improve (R) shoulder elevation to 160 deg in order to increase ease of performing ADLs  3. Patient will improve wrist flexibility and AROM to equal (B) in order to increase ease of driving for work  4. Patient will improve (B) cervical SB to 30 deg in order to indicate improved upper trapezius length and improve scapulohumeral rhythm  5. Patient to be independent with home exercise program as demonstrated by performance with correct form without cues. LTG: (to be met in 12 treatments)  1. Patient will improve (B) scapular strength to 4+/5 in order to increase ease of performing overhead tasks  2. Patient will improve (B) UE strength to 4+/5 in order to increase ease of performing lifting activities   3. Patient will report pain as 2/10 with lifting objects                    Patient goals: \"Decrease pain and not have to get another shot\"    Pt. Education:  [x] Yes  [] No  [x] Reviewed Prior HEP/Ed  Method of Education: [x] Verbal  [x] Demo  [] Written  Comprehension of Education:  [x] Verbalizes understanding. [x] Demonstrates understanding. [x] Needs review. [] Demonstrates/verbalizes HEP/Ed previously given. Plan: [x] Continue current frequency toward long and short term goals. [x] Specific Instructions for subsequent treatments: Continue tx per POC      Time In: 11:00 am                Time Out:  11:57 am     Electronically signed by:   Sirisha Izaguirre

## 2022-01-17 ENCOUNTER — HOSPITAL ENCOUNTER (OUTPATIENT)
Dept: PHYSICAL THERAPY | Facility: CLINIC | Age: 67
Setting detail: THERAPIES SERIES
Discharge: HOME | End: 2022-01-17
Payer: MEDICARE

## 2022-01-17 PROCEDURE — 97110 THERAPEUTIC EXERCISES: CPT

## 2022-01-17 PROCEDURE — 97140 MANUAL THERAPY 1/> REGIONS: CPT

## 2022-01-17 NOTE — FLOWSHEET NOTE
[] [facility]) - [facility] &  Therapy  [address]  [phone]  F: [phone] [] [address]  [place] [address]   [address]  P: [phone]  F: [phone] [x] [address] &  Therapy  [address] [facility] [address]  P: [phone]  F: [phone] [] [address]  P: [phone]  F: [phone] [] [address]  [facility]   [address]   [place]: [phone]  F: [phone]      Physical Therapy Daily Treatment Note    Date:  2022  Patient Name:  Toni Gonzalez    :  1955  MRN: [id number]   [address], CNP                                Insurance: [employer] Medicare (visits BMN)  Medical Diagnosis:   M25.511 (ICD-10-CM) - Right shoulder pain, unspecified chronicity   M25.521 (ICD-10-CM) - Right elbow pain   Rehab Codes: M54.6, M54.5, M62.81, R20.2, M25.511, M25.611, R29.3, M25.311, M25.521 Onset Date: 21 (thoracic pain) 2020 (shoulder pain)                  Next 's appt.: PRN  Visit# / total visits:  (2x wk)      Cancels/No Shows:   ALTERNATE BETWEEN SCRIPTS       Subjective: Pt stated pain is \"bad\" today in R shoulder and elbow. She stated was drying her car off this past Sat and has been elevated in pain since.      Pain:  [x] Yes  [] No  Location: R shoulder, R elbow  Pain Rating: (0-10 scale) R) Shoulder: 8/10 at rest,  (R) elbow:  8/10 at rest,   Pain altered Tx:  [x] No  [] Yes  Action:  Comments:    Objective:  Modalities:   Precautions:  Exercises:  Exercise Reps/ Time Weight/ Level Comments    UBE  6'    held --   Pulleys  3/3    Flex/scaption x              UT S 3x30\"     x   Levator Scap S 3x30\"     x   Doorway Pec S 3x30\"        Wrist Flexion S 30\"x3        Wrist Ext S 30\"x3     x          Eccentric RD 2x10 2#  Held 1/10   Eccentric Wrist ext 2x10 2# No weight 1/6 Held 1/10              Wall Slides  10x3\"   Flex, Abd Held   Wand S  10x5\"    flex, abd, ER x              Prone Scaular Retractions  10x5\"        Prone Scapular Retractions + Depression + shoulder extension (lift hand off mat) 10x5''   Held   Other: 1/12/22:   Hassel Deis  , DI R wrist extensors near epicondyle                              R shoulder PROM with distraction, Supine Pec Release                              Hypervolt to R UT/Levator  1/17/22: Hawk  to R epicondyle, wrist extensors, and R AC joint, DI to R pec and R wrist extensors near epicondyle, R shoulder PROM with distraction and mobs grade 1-2, hypervolt to R UT.                                       Specific Instructions for next treatment: Continue tx per POC      Treatment Charges: Mins Units   []  Modalities: MHP     [x]  Ther Exercise 18 1   [x]  Manual Therapy 25 2   []  Ther Activities     []  Aquatics     []  Vasocompression held    []  Other     Total Treatment time 43 3       Assessment: [x] Progressing toward goals. Continued to focus more on manual d/t elevation on pain on arrival. Initiated with pulleys with minimal discomfort but demo fair visual ROM. Completed manual as listed above with excellent carryover post. Added hawk gips to R AC joint d/t noted pain and muscle tension just distal with noted improvement post. Pt was ed on performing cross friction massage and DI independently for pain relief. Pt reported R shoulder was \"awesome\" post with 0/10 pain and noted improvement in R elbow. Pt declined vaso this date and will ice at home as PRN. [] No change.      [] Other:  [x] Patient would continue to benefit from skilled physical therapy services in order to: improve impairments and overall quality of life    STG: (to be met in 6 treatments)  1. ? Pain: Patient will report pain as 5/10 with lifting objects   2. ? ROM: Patient will improve (R) shoulder elevation to 160 deg in order to increase ease of performing ADLs  3. Patient will improve wrist flexibility and AROM to equal (B) in order to increase ease of driving for work  4. Patient will improve (B) cervical SB to 30 deg in order to indicate improved upper trapezius length and improve scapulohumeral rhythm  5. Patient to be independent with home exercise program as demonstrated by performance with correct form without cues. LTG: (to be met in 12 treatments)  1. Patient will improve (B) scapular strength to 4+/5 in order to increase ease of performing overhead tasks  2. Patient will improve (B) UE strength to 4+/5 in order to increase ease of performing lifting activities   3. Patient will report pain as 2/10 with lifting objects                    Patient goals: \"Decrease pain and not have to get another shot\"    Pt. Education:  [x] Yes  [] No  [x] Reviewed Prior HEP/Ed  Method of Education: [x] Verbal  [x] Demo  [] Written  Comprehension of Education:  [x] Verbalizes understanding. [x] Demonstrates understanding. [x] Needs review. [] Demonstrates/verbalizes HEP/Ed previously given. Plan: [x] Continue current frequency toward long and short term goals.     [x] Specific Instructions for subsequent treatments: Continue tx per POC      Time In: 2:00pm                Time Out:  3:00pm    Electronically signed by:  Ruddy Urbina PTA

## 2022-01-19 ENCOUNTER — HOSPITAL ENCOUNTER (OUTPATIENT)
Dept: PHYSICAL THERAPY | Facility: CLINIC | Age: 67
Setting detail: THERAPIES SERIES
Discharge: HOME | End: 2022-01-19
Payer: MEDICARE

## 2022-01-19 PROCEDURE — 97140 MANUAL THERAPY 1/> REGIONS: CPT

## 2022-01-19 PROCEDURE — 97110 THERAPEUTIC EXERCISES: CPT

## 2022-01-19 NOTE — FLOWSHEET NOTE
[] [facility]) - [facility] &  Therapy  [address]  [phone]  F: [phone] [] [address]  [facility] 36   [address]  P: [phone]  F: [phone] [x] [address] &  Therapy  [address]  P: [phone]  F: [phone] [] [address]  P: [phone]  F: [phone] [] [address]  [facility]   [address]   [place]: [phone]  F: [phone]      Physical Therapy Daily Treatment Note    Date:  2022  Patient Name:  Dharmesh Jackson    :  1955  MRN: [id number]   [address], CNP                                Insurance: [employer] Medicare (visits BMN)  Medical Diagnosis:   M25.511 (ICD-10-CM) - Right shoulder pain, unspecified chronicity   M25.521 (ICD-10-CM) - Right elbow pain   Rehab Codes: M54.6, M54.5, M62.81, R20.2, M25.511, M25.611, R29.3, M25.311, M25.521 Onset Date: 21 (thoracic pain) 2020 (shoulder pain)                  Next 's appt.: PRN  Visit# / total visits:  (2x wk)      Cancels/No Shows:   ALTERNATE BETWEEN SCRIPTS       Subjective: Pt stated pain in shoulder and elbow have maintained lower since last tx and is very pleased.      Pain:  [x] Yes  [] No  Location: R shoulder, R elbow  Pain Rating: (0-10 scale) R) Shoulder: 3/10 at rest,  (R) elbow:  3/10 at rest,   Pain altered Tx:  [x] No  [] Yes  Action:  Comments:    Objective:  Modalities:   Precautions:  Exercises:  Exercise Reps/ Time Weight/ Level Comments    UBE  6'    held --   Pulleys  3/3    Flex/scaption x              UT S 3x30\"     x   Levator Scap S 3x30\"     x   Doorway Pec S 3x30\"        Wrist Flexion S 30\"x3        Wrist Ext S 30\"x3     x          Eccentric RD 2x10 2#  Held 1/10   Eccentric Wrist ext 2x10 2# No weight 1/6 Held 1/10              Wall Slides  10x3\"   Flex, Abd Held   Wand S  10x5\"    flex, abd, ER x              Prone Scaular Retractions  10x5\"        Prone Scapular Retractions + Depression + shoulder extension (lift hand off mat) 10x5''   Held   Other: 1/12/22:   Lova Creed  , DI R wrist extensors near epicondyle                              R shoulder PROM with distraction, Supine Pec Release                              Hypervolt to R UT/Levator  1/17/22: Hawk  to R epicondyle, wrist extensors, and R AC joint, DI to R pec and R wrist extensors near epicondyle, R shoulder PROM with distraction and mobs grade 1-2, hypervolt to R UT.   1/22/22:  Hawk  to R epicondyle, wrist extensors, and R AC joint, DI to R pec and R wrist extensors near epicondyle, R shoulder PROM with distraction and mobs grade 1-2, hypervolt to R UT.                                         Specific Instructions for next treatment: Continue tx per POC      Treatment Charges: Mins Units   []  Modalities: MHP     [x]  Ther Exercise 18 1   [x]  Manual Therapy 25 2   []  Ther Activities     []  Aquatics     []  Vasocompression held    []  Other     Total Treatment time 43 3       Assessment: [x] Progressing toward goals. Continued same regiment as previous with focus on manual. Noted painful on proximal shoulder with active range in ER/IR but no associated pain with PROM. Pt is limited in range by pain in AROM in all planes but is able achieve full ROM with PROM. Pt continues to have 100% pain relief with posterior mobs and distraction. [] No change. [] Other:  [x] Patient would continue to benefit from skilled physical therapy services in order to: improve impairments and overall quality of life    STG: (to be met in 6 treatments)  1. ? Pain: Patient will report pain as 5/10 with lifting objects   2. ? ROM: Patient will improve (R) shoulder elevation to 160 deg in order to increase ease of performing ADLs  3.  Patient will improve wrist flexibility and AROM to equal (B) in order to increase ease of driving for work  4. Patient will improve (B) cervical SB to 30 deg in order to indicate improved upper trapezius length and improve scapulohumeral rhythm  5. Patient to be independent with home exercise program as demonstrated by performance with correct form without cues. LTG: (to be met in 12 treatments)  1. Patient will improve (B) scapular strength to 4+/5 in order to increase ease of performing overhead tasks  2. Patient will improve (B) UE strength to 4+/5 in order to increase ease of performing lifting activities   3. Patient will report pain as 2/10 with lifting objects                    Patient goals: \"Decrease pain and not have to get another shot\"    Pt. Education:  [x] Yes  [] No  [x] Reviewed Prior HEP/Ed  Method of Education: [x] Verbal  [x] Demo  [] Written  Comprehension of Education:  [x] Verbalizes understanding. [x] Demonstrates understanding. [x] Needs review. [] Demonstrates/verbalizes HEP/Ed previously given. Plan: [x] Continue current frequency toward long and short term goals.     [x] Specific Instructions for subsequent treatments: Continue tx per POC      Time In: 2:00pm                Time Out:  3:00pm    Electronically signed by:  Dalila Garcia PTA

## 2022-01-25 ENCOUNTER — HOSPITAL ENCOUNTER (OUTPATIENT)
Dept: PHYSICAL THERAPY | Facility: CLINIC | Age: 67
Setting detail: THERAPIES SERIES
Discharge: HOME | End: 2022-01-25
Payer: MEDICARE

## 2022-01-25 PROCEDURE — 97110 THERAPEUTIC EXERCISES: CPT

## 2022-01-25 PROCEDURE — 97140 MANUAL THERAPY 1/> REGIONS: CPT

## 2022-01-25 NOTE — FLOWSHEET NOTE
[] [facility]) - [facility] &  Therapy  [address]  [phone]  F: [phone] [] [address]  [facility] 36   [address]  P: [phone]  F: [phone] [x] [address] &  Therapy  [address]  P: [phone]  F: [phone] [] [address]  P: [phone]  F: [phone] [] [address]  [facility]   [address]   [place]: [phone]  F: [phone]      Physical Therapy Daily Treatment Note    Date:  2022  Patient Name:  Cy Renee    :  1955  MRN: [id number]   [address], CNP                                Insurance: [facility] Medicare (visits BMN)  Medical Diagnosis:   M25.511 (ICD-10-CM) - Right shoulder pain, unspecified chronicity   M25.521 (ICD-10-CM) - Right elbow pain   Rehab Codes: M54.6, M54.5, M62.81, R20.2, M25.511, M25.611, R29.3, M25.311, M25.521 Onset Date: 21 (thoracic pain) 2020 (shoulder pain)                  Next 's appt.: PRN  Visit# / total visits: 10/12 (2x wk)      Cancels/No Shows:   ALTERNATE BETWEEN SCRIPTS       Subjective: Pt arrived stating she had been having reduced pain however reported relief was \"short-lived\".      Pain:  [x] Yes  [] No  Location: R shoulder, R elbow  Pain Rating: (0-10 scale) R) Shoulder: 6-7/10 at rest,  (R) elbow:  6-7/10 at rest,   Pain altered Tx:  [x] No  [] Yes  Action:  Comments:    Objective:  Modalities:   Precautions:  Exercises:  Exercise Reps/ Time Weight/ Level Comments    UBE  6'    held --   Pulleys  3/3    Flex/scaption x              UT S 3x30\"     x   Levator Scap S 3x30\"     x   Doorway Pec S 3x30\"        Wrist Flexion S 30\"x3        Wrist Ext S 30\"x3     x          Eccentric RD 2x10 2#  Held 1/10   Eccentric Wrist ext 2x10 2# No weight 1/6 Held 1/10              Wall Slides  10x3\"   Flex, Abd Held   Wand S  10x5\"    flex, abd, ER               Prone Scaular Retractions  10x5\"        Prone Scapular Retractions + Depression + shoulder extension (lift hand off mat) 10x5''   Held   Other: 1/12/22:   Destinee Mercy  , DI R wrist extensors near epicondyle                              R shoulder PROM with distraction, Supine Pec Release                              Hypervolt to R UT/Levator  1/17/22: Hawk  to R epicondyle, wrist extensors, and R AC joint, DI to R pec and R wrist extensors near epicondyle, R shoulder PROM with distraction and mobs grade 1-2, hypervolt to R UT.   1/22/22:  Hawk  to R epicondyle, wrist extensors, and R AC joint, DI to R pec and R wrist extensors near epicondyle, R shoulder PROM with distraction and mobs grade 1-2, hypervolt to R UT.   1/25/22:  Hawk  to R epicondyle, wrist extensors, and R AC joint, DI to R pec and R wrist extensors near epicondyle, R shoulder PROM with distraction and mobs grade 1-2. D/C Hypervolt, MFR to R UT, infraspinatus and teres minor                                     Specific Instructions for next treatment: Continue tx per POC      Treatment Charges: Mins Units   []  Modalities: MHP     [x]  Ther Exercise 15 1   [x]  Manual Therapy 25 2   []  Ther Activities     []  Aquatics     []  Vasocompression     []  Other     Total Treatment time 40 3       Assessment: [x] Progressing toward goals. Initiated session with pulleys to promote increased flexibility and movement. Followed with stretching as noted above with pt stating she has been compliant daily. Focused tx on manual d/t heightened pain level and previous relief from sessions. Sig tension assessed upon wrist extensors and near epicondyle with good relief noted w/ Hawkgrips. Continued w/ PROM (stretch at end range), distraction and posterior mobs to R shoulder to achieve improved joint mobility.  Pt stated she finds herself being \"tense\" with the use of Hypervolt; discontinued use. Pain level reduced to a 4/10 post session. [] No change. [] Other:  [x] Patient would continue to benefit from skilled physical therapy services in order to: improve impairments and overall quality of life    STG: (to be met in 6 treatments)  1. ? Pain: Patient will report pain as 5/10 with lifting objects   2. ? ROM: Patient will improve (R) shoulder elevation to 160 deg in order to increase ease of performing ADLs  3. Patient will improve wrist flexibility and AROM to equal (B) in order to increase ease of driving for work  4. Patient will improve (B) cervical SB to 30 deg in order to indicate improved upper trapezius length and improve scapulohumeral rhythm  5. Patient to be independent with home exercise program as demonstrated by performance with correct form without cues. LTG: (to be met in 12 treatments)  1. Patient will improve (B) scapular strength to 4+/5 in order to increase ease of performing overhead tasks  2. Patient will improve (B) UE strength to 4+/5 in order to increase ease of performing lifting activities   3. Patient will report pain as 2/10 with lifting objects                    Patient goals: \"Decrease pain and not have to get another shot\"    Pt. Education:  [x] Yes  [] No  [x] Reviewed Prior HEP/Ed  Method of Education: [x] Verbal  [x] Demo  [] Written  Comprehension of Education:  [x] Verbalizes understanding. [x] Demonstrates understanding. [x] Needs review. [] Demonstrates/verbalizes HEP/Ed previously given. Plan: [x] Continue current frequency toward long and short term goals. [x] Specific Instructions for subsequent treatments: Continue tx per POC      Time In: 1:00 pm              Time Out: 1:50 pm    Electronically signed by:   Sirisha Pratt

## 2022-01-27 ENCOUNTER — OFFICE VISIT (OUTPATIENT)
Dept: PRIMARY CARE CLINIC | Age: 67
End: 2022-01-27
Payer: MEDICARE

## 2022-01-27 ENCOUNTER — HOSPITAL ENCOUNTER (OUTPATIENT)
Dept: PHYSICAL THERAPY | Facility: CLINIC | Age: 67
Setting detail: THERAPIES SERIES
Discharge: HOME | End: 2022-01-27
Payer: MEDICARE

## 2022-01-27 VITALS
DIASTOLIC BLOOD PRESSURE: 80 MMHG | WEIGHT: 167.4 LBS | HEART RATE: 73 BPM | BODY MASS INDEX: 26.9 KG/M2 | SYSTOLIC BLOOD PRESSURE: 122 MMHG | HEIGHT: 66 IN | OXYGEN SATURATION: 98 %

## 2022-01-27 DIAGNOSIS — M25.511 CHRONIC RIGHT SHOULDER PAIN: ICD-10-CM

## 2022-01-27 DIAGNOSIS — J44.9 CHRONIC OBSTRUCTIVE PULMONARY DISEASE, UNSPECIFIED COPD TYPE (HCC): ICD-10-CM

## 2022-01-27 DIAGNOSIS — E11.9 TYPE 2 DIABETES MELLITUS WITHOUT COMPLICATION, WITHOUT LONG-TERM CURRENT USE OF INSULIN (HCC): Primary | ICD-10-CM

## 2022-01-27 DIAGNOSIS — M25.521 RIGHT ELBOW PAIN: ICD-10-CM

## 2022-01-27 DIAGNOSIS — I48.91 ATRIAL FIBRILLATION, UNSPECIFIED TYPE (HCC): ICD-10-CM

## 2022-01-27 DIAGNOSIS — G89.29 CHRONIC RIGHT SHOULDER PAIN: ICD-10-CM

## 2022-01-27 LAB
CREATININE URINE POCT: 50
HBA1C MFR BLD: 6.8 %
MICROALBUMIN/CREAT 24H UR: 10 MG/G{CREAT}
MICROALBUMIN/CREAT UR-RTO: <30

## 2022-01-27 PROCEDURE — 99397 PER PM REEVAL EST PAT 65+ YR: CPT | Performed by: NURSE PRACTITIONER

## 2022-01-27 PROCEDURE — 97110 THERAPEUTIC EXERCISES: CPT

## 2022-01-27 PROCEDURE — 82044 UR ALBUMIN SEMIQUANTITATIVE: CPT | Performed by: NURSE PRACTITIONER

## 2022-01-27 PROCEDURE — 83036 HEMOGLOBIN GLYCOSYLATED A1C: CPT | Performed by: NURSE PRACTITIONER

## 2022-01-27 PROCEDURE — 97140 MANUAL THERAPY 1/> REGIONS: CPT

## 2022-01-27 PROCEDURE — 97016 VASOPNEUMATIC DEVICE THERAPY: CPT

## 2022-01-27 ASSESSMENT — LIFESTYLE VARIABLES
HOW MANY STANDARD DRINKS CONTAINING ALCOHOL DO YOU HAVE ON A TYPICAL DAY: 0
HOW OFTEN DO YOU HAVE SIX OR MORE DRINKS ON ONE OCCASION: 0
HOW OFTEN DO YOU HAVE A DRINK CONTAINING ALCOHOL: 1
HOW OFTEN DURING THE LAST YEAR HAVE YOU NEEDED AN ALCOHOLIC DRINK FIRST THING IN THE MORNING TO GET YOURSELF GOING AFTER A NIGHT OF HEAVY DRINKING: 0
HOW OFTEN DURING THE LAST YEAR HAVE YOU FAILED TO DO WHAT WAS NORMALLY EXPECTED FROM YOU BECAUSE OF DRINKING: 0
AUDIT TOTAL SCORE: 1
HOW OFTEN DURING THE LAST YEAR HAVE YOU BEEN UNABLE TO REMEMBER WHAT HAPPENED THE NIGHT BEFORE BECAUSE YOU HAD BEEN DRINKING: 0
AUDIT-C TOTAL SCORE: 1
HAVE YOU OR SOMEONE ELSE BEEN INJURED AS A RESULT OF YOUR DRINKING: 0
HOW OFTEN DURING THE LAST YEAR HAVE YOU FOUND THAT YOU WERE NOT ABLE TO STOP DRINKING ONCE YOU HAD STARTED: 0
HOW OFTEN DURING THE LAST YEAR HAVE YOU HAD A FEELING OF GUILT OR REMORSE AFTER DRINKING: 0
HAS A RELATIVE, FRIEND, DOCTOR, OR ANOTHER HEALTH PROFESSIONAL EXPRESSED CONCERN ABOUT YOUR DRINKING OR SUGGESTED YOU CUT DOWN: 0

## 2022-01-27 ASSESSMENT — PATIENT HEALTH QUESTIONNAIRE - PHQ9
SUM OF ALL RESPONSES TO PHQ QUESTIONS 1-9: 0
SUM OF ALL RESPONSES TO PHQ QUESTIONS 1-9: 0
SUM OF ALL RESPONSES TO PHQ9 QUESTIONS 1 & 2: 0
2. FEELING DOWN, DEPRESSED OR HOPELESS: 0
1. LITTLE INTEREST OR PLEASURE IN DOING THINGS: 0
SUM OF ALL RESPONSES TO PHQ QUESTIONS 1-9: 0
SUM OF ALL RESPONSES TO PHQ QUESTIONS 1-9: 0

## 2022-01-27 NOTE — PROGRESS NOTES
Medicare Annual Wellness Visit  Name: Shivam Koroma Date: 2022   MRN: [id number] Sex: Female   Age: 74 y.o. Ethnicity: Non- / Non    : 1955 Race: White (non-)      Benton Antunez is here for Medicare AWV, Health Maintenance, and Back Pain (follow up on back, shoulder & elbow pain)    Screenings for behavioral, psychosocial and functional/safety risks, and cognitive dysfunction are all negative except as indicated below. These results, as well as other patient data from the [facility], [place], operated by [facility] form, are documented in Flowsheets linked to this Encounter. Allergies   Allergen Reactions    Codeine Nausea Only       Prior to Visit Medications    Medication Sig Taking? Authorizing Provider   tiZANidine (ZANAFLEX) 4 MG tablet Take 1 tablet by mouth 3 times daily  DEISY Berger CNP   clotrimazole-betamethasone (LOTRISONE) 1-0.05 % cream Apply topically 2 times daily Apply topically 2 times daily. DEISY Berger CNP   omeprazole (PRILOSEC) 10 MG delayed release capsule Take 10 mg by mouth daily  Historical Provider, MD   Semaglutide, 1 MG/DOSE, (OZEMPIC, 1 MG/DOSE,) 2 MG/1.5ML SOPN Inject 1 mg into the skin once a week  DEISY Berger CNP   Handicap Placard MISC by Does not apply route Expires 2025  DEISY Berger CNP   glucose blood VI test strips (ASCENSIA AUTODISC VI;ONE TOUCH ULTRA TEST VI) strip 1 each by Other route daily Use with associated glucose meter.   DEISY Berger CNP       Past Medical History:   Diagnosis Date    Diabetes mellitus ([address]     Fibromyalgia     GERD (gastroesophageal reflux disease)        Past Surgical History:   Procedure Laterality Date    CARPAL TUNNEL RELEASE      FINGER TRIGGER RELEASE Right 2018    ring    FINGER TRIGGER RELEASE N/A 2018    RIGHT RING FINGER TRIGGER RELEASE performed by York Bamberger, DO at [address] [place] [address] ULNAR TUNNEL RELEASE Bilateral        Family History   Problem Relation Age of Onset    Diabetes Maternal Grandmother     Diabetes Maternal Grandfather        CareTeam (Including outside providers/suppliers regularly involved in providing care):   Patient Care Team:  DEISY Joe CNP as PCP - General (Nurse Practitioner)  DEISY Joe CNP as PCP [facility]    Wt Readings from Last 3 Encounters:   01/27/22 167 lb 6.4 oz (75.9 kg)   12/02/21 168 lb 12.8 oz (76.6 kg)   09/10/21 164 lb 12.8 oz (74.8 kg)     Vitals:    01/27/22 0957   BP: 122/80   Pulse: 73   SpO2: 98%   Weight: 167 lb 6.4 oz (75.9 kg)   Height: 5' 6\" (1.676 m)     Body mass index is 27.02 kg/m². Based upon direct observation of the patient, evaluation of cognition reveals recent and remote memory intact.     General Appearance: alert and oriented to person, place and time, well developed and well- nourished, in no acute distress  Skin: warm and dry, no rash or erythema  Head: normocephalic and atraumatic  Eyes: pupils equal, round, and reactive to light, extraocular eye movements intact, conjunctivae normal  ENT: tympanic membrane, external ear and ear canal normal bilaterally, nose without deformity, nasal mucosa and turbinates normal without polyps  Neck: supple and non-tender without mass, no thyromegaly or thyroid nodules, no cervical lymphadenopathy  Pulmonary/Chest: clear to auscultation bilaterally- no wheezes, rales or rhonchi, normal air movement, no respiratory distress  Cardiovascular: normal rate, regular rhythm, normal S1 and S2, no murmurs, rubs, clicks, or gallops, distal pulses intact, no carotid bruits  Abdomen: soft, non-tender, non-distended, normal bowel sounds, no masses or organomegaly  Extremities: no cyanosis, clubbing or edema  Musculoskeletal: normal range of motion, no joint swelling, deformity or tenderness  Neurologic: reflexes normal and symmetric, no cranial nerve deficit, gait, coordination and speech normal    Patient's complete Health Risk Assessment and screening values have been reviewed and are found in Flowsheets. The following problems were reviewed today and where indicated follow up appointments were made and/or referrals ordered. Positive Risk Factor Screenings with Interventions:            General Health and ACP:  General  In general, how would you say your health is?: Excellent  In the past 7 days, have you experienced any of the following?  New or Increased Pain, New or Increased Fatigue, Loneliness, Social Isolation, Stress or Anger?: None of These  Do you get the social and emotional support that you need?: Yes  Do you have a Living Will?: Yes  Advance Directives     Power of [facility] Will ACP-Advance Directive ACP-Power of     Not on File Not on File Not on File Not on File      General Health Risk Interventions:  · no issues identified      Safety:  Safety  Do you have working smoke detectors?: Yes  Have all throw rugs been removed or fastened?: Yes  Do you have non-slip mats or surfaces in all bathtubs/showers?: (!) No  Do all of your stairways have a railing or banister?: Yes  Are your doorways, halls and stairs free of clutter?: Yes  Do you always fasten your seatbelt when you are in a car?: Yes  Safety Interventions:  · Home safety tips provided       Personalized Preventive Plan   Current Health Maintenance Status  Immunization History   Administered Date(s) Administered    Pneumococcal Polysaccharide ([id number]) 07/29/2021        Health Maintenance   Topic Date Due    COVID-19 Vaccine (1) Never done    Colon Cancer Screen FIT/FOBT  09/19/2018    Lipid screen  04/29/2021    Annual Wellness Visit (AWV)  Never done    Diabetic foot exam  02/03/2022    Depression Screen  02/03/2022    Shingles Vaccine (1 of 2) 02/03/2022 (Originally 8/11/2003)    Diabetic retinal exam  02/13/2022 (Originally 1/3/2018)    DTaP/Tdap/Td vaccine (1 - Tdap) 02/17/2022 (Originally 8/11/1972)    Flu vaccine (1) 12/02/2022 (Originally 9/1/2021)    A1C test (Diabetic or Prediabetic)  01/27/2023    Diabetic microalbuminuria test  01/27/2023    Breast cancer screen  03/08/2023    DEXA (modify frequency per FRAX score)  Completed    Pneumococcal 65+ years Vaccine  Completed    Hepatitis A vaccine  Aged Out    Hib vaccine  Aged Out    Meningococcal (ACWY) vaccine  Aged Out    Hepatitis C screen  Discontinued     Recommendations for Preventive Services Due: see orders and patient instructions/AVS.  . Recommended screening schedule for the next 5-10 years is provided to the patient in written form: see Patient Instructions/AVS.    Barbara Capps was seen today for medicare awv, health maintenance and back pain.     Diagnoses and all orders for this visit:    Type 2 diabetes mellitus without complication, without long-term current use of insulin (HCC)  -     POCT glycosylated hemoglobin (Hb A1C)  -     POCT microalbumin    Chronic obstructive pulmonary disease, unspecified COPD type (HCC)    Atrial fibrillation, unspecified type (HCC)    Chronic right shoulder pain  -     External Referral To Orthopedic Surgery    Right elbow pain  -     External Referral To Orthopedic Surgery           Presents for AWV  BP well controlled  Has lost 1lb since LOV    Mammogram up to date, will do biannually    Continues to have right shoulder and elbow pain  No improvement with PT, last session tomorrow  Would like to return to Dr. Salazar Lozada for eval, referral given    Continues to have left sided mid back pain  Worse at night when trying to sleep  Will have to support her back  \"Feels like it is pulling from the inside\"  Reviewed CT abdomen negative  I do feel it is muscular, no improvement with PT  After she addresses shoulder/elbow she will notify office if this persists to be addressed    hga1c 6.6 to 6.8  Continues with diet/exercise  Continues ozempic- denies any side effects    Has bridget at home to complete  Has orders for annual labs in the system    Denies any other problems/concerns  Follow up in six months for recheck

## 2022-01-27 NOTE — FLOWSHEET NOTE
[] [facility]) - [facility] &  Therapy  [address]  [phone]  F: [phone] [] [address]  [employer] [address]   [address]  P: [phone]  F: [phone] [x] [address] &  Therapy  [address]  P: [phone]  F: [phone] [] [address]  P: [phone]  F: [phone] [] [address]  [facility]   [address]   [place]: [phone]  F: [phone]      Physical Therapy Daily Treatment Note    Date:  2022  Patient Name:  Jessica Oates    :  1955  MRN: [id number]   [address], CNP                                Insurance: Neville Frankel Medicare (visits BMN)  Medical Diagnosis:   M25.511 (ICD-10-CM) - Right shoulder pain, unspecified chronicity   M25.521 (ICD-10-CM) - Right elbow pain   Rehab Codes: M54.6, M54.5, M62.81, R20.2, M25.511, M25.611, R29.3, M25.311, M25.521 Onset Date: 19 (thoracic pain) 2020 (shoulder pain)                  Next 's appt.: PRN  Visit# / total visits: 10/12 (2x wk)      Cancels/No Shows:   ALTERNATE BETWEEN SCRIPTS       Subjective: Pt arrived stating no change. She reported seeing her PCP and she plans on sending her to see an orthopedic doctor next.      Pain:  [x] Yes  [] No  Location: R shoulder, R elbow  Pain Rating: (0-10 scale) R) Shoulder: 8/10 at rest,  (R) elbow:  8/10 at rest,   Pain altered Tx:  [x] No  [] Yes  Action:  Comments:    Objective:  Modalities:   Precautions:  Exercises:  Exercise Reps/ Time Weight/ Level Comments    UBE  6'    held --   Pulleys  3/3    Flex/scaption x              UT S 3x30\"     x   Levator Scap S 3x30\"     x   Doorway Pec S 3x30\"        Wrist Flexion S 30\"x3        Wrist Ext S 30\"x3     x          Eccentric RD 2x10 2#     Eccentric Wrist ext 2x10 2# No weight 1/6               Wall Slides  10x3\"   Flex, Abd    Wand S  10x5\"    flex, abd, ER x              Prone Scaular Retractions  10x5\"        Prone Scapular Retractions + Depression + shoulder extension (lift hand off mat) 10x5''      Other: 1/12/22:   Kinza Maribel  , DI R wrist extensors near epicondyle                              R shoulder PROM with distraction, Supine Pec Release                              Hypervolt to R UT/Levator  1/17/22: Hawk  to R epicondyle, wrist extensors, and R AC joint, DI to R pec and R wrist extensors near epicondyle, R shoulder PROM with distraction and mobs grade 1-2, hypervolt to R UT.   1/22/22:  Hawk  to R epicondyle, wrist extensors, and R AC joint, DI to R pec and R wrist extensors near epicondyle, R shoulder PROM with distraction and mobs grade 1-2, hypervolt to R UT.   1/25/22:  Hawk  to R epicondyle, wrist extensors, and R AC joint, DI to R pec and R wrist extensors near epicondyle, R shoulder PROM with distraction and mobs grade 1-2. D/C Hypervolt, MFR to R UT, infraspinatus and teres minor                                     Specific Instructions for next treatment: Continue tx per POC      Treatment Charges: Mins Units   []  Modalities: MHP     [x]  Ther Exercise 15 1   [x]  Manual Therapy 25 2   []  Ther Activities     []  Aquatics     [x]  Vasocompression 15 1   []  Other     Total Treatment time 55 4       Assessment: [x] Progressing toward goals. Continue with regiment above with no change or long term carryover. Resumed vaso for sxs relief with decrease in pain post.        [] No change.      [] Other:  [x] Patient would continue to benefit from skilled physical therapy services in order to: improve impairments and overall quality of life    STG: (to be met in 6 treatments)  1. ? Pain: Patient will report pain as 5/10 with lifting objects   2. ? ROM: Patient will improve (R) shoulder elevation to 160 deg in order to increase ease of performing ADLs  3. Patient will improve wrist flexibility and AROM to equal (B) in order to increase ease of driving for work  4. Patient will improve (B) cervical SB to 30 deg in order to indicate improved upper trapezius length and improve scapulohumeral rhythm  5. Patient to be independent with home exercise program as demonstrated by performance with correct form without cues. LTG: (to be met in 12 treatments)  1. Patient will improve (B) scapular strength to 4+/5 in order to increase ease of performing overhead tasks  2. Patient will improve (B) UE strength to 4+/5 in order to increase ease of performing lifting activities   3. Patient will report pain as 2/10 with lifting objects                    Patient goals: \"Decrease pain and not have to get another shot\"    Pt. Education:  [x] Yes  [] No  [x] Reviewed Prior HEP/Ed  Method of Education: [x] Verbal  [x] Demo  [] Written  Comprehension of Education:  [x] Verbalizes understanding. [x] Demonstrates understanding. [x] Needs review. [] Demonstrates/verbalizes HEP/Ed previously given. Plan: [x] Continue current frequency toward long and short term goals.     [x] Specific Instructions for subsequent treatments: Continue tx per POC      Time In: 12:00 pm              Time Out: 1:10 pm    Electronically signed by:  Bonnie Pierre PTA

## 2022-02-08 ENCOUNTER — HOSPITAL ENCOUNTER (OUTPATIENT)
Dept: PHYSICAL THERAPY | Facility: CLINIC | Age: 67
Setting detail: THERAPIES SERIES
Discharge: HOME | End: 2022-02-08
Payer: MEDICARE

## 2022-02-08 PROCEDURE — 97530 THERAPEUTIC ACTIVITIES: CPT

## 2022-02-08 PROCEDURE — 97110 THERAPEUTIC EXERCISES: CPT

## 2022-02-08 NOTE — PROGRESS NOTES
[] [facility]) - [facility] &  Therapy  [address]  [phone]  F: [phone] [] [address]  [facility]   [address]  P: [phone]  F: [phone] [x] [address] &  Therapy  [address] [facility]  P: [phone]  F: [phone] [] [address]  P: [phone]  F: [phone] [] [address]  [facility]   [address]   [place]: [phone]  F: [phone]      Physical Therapy Daily Treatment Note    Date:  2022  Patient Name:  Dharmesh Jackson    :  1955  MRN: [id number]   [address], CNP                                Insurance: Jessa Lush Medicare (visits BMN)  Medical Diagnosis:   M25.511 (ICD-10-CM) - Right shoulder pain, unspecified chronicity   M25.521 (ICD-10-CM) - Right elbow pain   Rehab Codes: M54.6, M54.5, M62.81, R20.2, M25.511, M25.611, R29.3, M25.311, M25.521 Onset Date: 19 (thoracic pain) 2020 (shoulder pain)                  Next 's appt.: PRN  Visit# / total visits:  (2x wk)      Cancels/No Shows:   ALTERNATE BETWEEN SCRIPTS       Subjective: Pt arrived stating that she saw her PCP and is to see orthopedic doctor due to little progress shown at physical therapy. Patient states that she has began pilate's at this time with some relief in (R) shoulder pain. States that she has been completing self STM and DI to (R) shoulder and elbow with use of tennis ball with some relief.      Pain:  [x] Yes  [] No  Location: R shoulder, R elbow  Pain Rating: (0-10 scale) R) Shoulder: -/10 at rest,  (R) elbow:  -/10 at rest,   Pain altered Tx:  [x] No  [] Yes  Action:  Comments:    Objective:   ROM  °A/P END FEEL STRENGTH     Left Right   Left Right   Sit Shld Flex             Sit Shld Abd           Sit Shld IR             Shoulder Flex 150 125   5 5   Ext              140   5 4+   ER @ 0 45 90       5 5   IR       5 4   Supraspinatus       5 4+   Infraspinatus       5 5   Serratus Ant       5 5   Mid Trap       4 3   Lower Trap       3 3   Elbow Flex. [facility] WFL   4+ 4+   Elbow Ext. [facility] WFL   4+ 4   Wrist Flexion 60 60   5 5   Wrist extension 55 55   5 4+       Modalities:   Precautions:  Exercises:  Exercise Reps/ Time Weight/ Level Comments    UBE  6'    held --   Pulleys  3/3    Flex/scaption               UT S 3x30\"        Levator Scap S 3x30\"        Doorway Pec S 3x30\"        Wrist Flexion S 30\"x3        Wrist Ext S 30\"x3               Eccentric RD 2x10 2#     Eccentric Wrist ext 2x10 2# No weight 1/6               Wall Slides  10x3\"   Flex, Abd    Wand S  10x5\"    flex, abd, ER               SL Abd x15   x   SL HAB x15   x   SL ER x15   x   Prone Scaular Retractions 10x5\"     x   Prone Scapular IYT x10   x   TB Rows x15 lime  x   TB Shoulder ext x15 lime  x   Other: 1/12/22:   Hawk  , DI R wrist extensors near epicondyle                              R shoulder PROM with distraction, Supine Pec Release                              Hypervolt to R UT/Levator  1/17/22: Hawk  to R epicondyle, wrist extensors, and R AC joint, DI to R pec and R wrist extensors near epicondyle, R shoulder PROM with distraction and mobs grade 1-2, hypervolt to R UT.   1/22/22:  Hawk  to R epicondyle, wrist extensors, and R AC joint, DI to R pec and R wrist extensors near epicondyle, R shoulder PROM with distraction and mobs grade 1-2, hypervolt to R UT.   1/25/22:  Hawk  to R epicondyle, wrist extensors, and R AC joint, DI to R pec and R wrist extensors near epicondyle, R shoulder PROM with distraction and mobs grade 1-2.  D/C Hypervolt, MFR to R UT, infraspinatus and teres minor                                     Specific Instructions for next treatment: Continue tx per POC      Treatment Charges: Mins Units   []  Modalities: MHP     [x]  Ther Exercise 30 2   []  Manual Therapy     [x]  Ther Activities 25 2   []  Aquatics     []  Vasocompression     []  Other     Total Treatment time 55 4       Assessment: [x] Began session with discussion of interventions that have been helpful while in physical therapy. Patient states that manual therapy to (R) pec and PROM was very beneficial in decreasing (R) shoulder pain. Patient states concerns about developing frozen shoulder within (R) shoulder. Educated patient on adhesive capsulitis and discussed with patient that full PROM does not indicate adhesive capsulitis at this time. Discussed utilizing counter force brace for decreased (R) elbow pain. Patient verbalized good understanding. Reassessed patient's goals this date with fair progress. Patient met 3/5 short term goals and 0/3 long term goals. Patient demonstrates improvements in (R) shoulder flexion AROM, cervical AROM, (B) wrist AROM and (R) UE strength. Discussed importance of postural stabilizer and cervical stabilizer strength in order to improve scapulohumeral rhythm. Discussed options for physical therapy with patient at this time. Plan to provide patient with proximal postural stabilizer strengthening ex for HEP and patient to return for final visit next week to determine if further physical therapy intervention is required or if patient is to see orthopedic physician. Patient agreed to this poc. Ended session reviewing new HEP and providing patient with updated HEP handout. Patient demonstrated good understanding of ex with no increase in (R) shoulder pain. STG: (to be met in 6 treatments)  1. ? Pain: Patient will report pain as 5/10 with lifting objects  8-9/10 pain with lifting objects (ongoing)   2. ? ROM: Patient will improve (R) shoulder elevation to 160 deg in order to increase ease of performing ADLs (R) shoulder flexion: 150 deg, (R) shoulder abd: 100 deg (ongoing)   3.  Patient will improve wrist flexibility and AROM to equal (B) in order to increase ease of driving for work (MET)  4. Patient will improve (B) cervical SB to 30 deg in order to indicate improved upper trapezius length and improve scapulohumeral rhythm (R) cervical SB: 35 deg, (L) cervical SB: 30 deg (met)   5. Patient to be independent with home exercise program as demonstrated by performance with correct form without cues. Patient states completing ex at home on a regular basis (MET)     LTG: (to be met in 12 treatments)  1. Patient will improve (B) scapular strength to 4+/5 in order to increase ease of performing overhead tasks (ongoing)   2. Patient will improve (B) UE strength to 4+/5 in order to increase ease of performing lifting activities (ongoing)   3. Patient will report pain as 2/10 with lifting objects Patient reports pain as 8-9/10 with lifting objects (ongoing)                     Patient goals: \"Decrease pain and not have to get another shot\"    Pt. Education:  [x] Yes  [] No  [x] Reviewed Prior HEP/Ed  Access Code: [email or web address]  URL: "[email or web address]. [email or web address]  Date: 02/08/2022  Prepared by: Zachary Parish    Exercises  Sidelying Shoulder Abduction - 2 x daily - 7 x weekly - 3 sets - 10 reps  Sidelying Shoulder Horizontal Abduction - 2 x daily - 7 x weekly - 3 sets - 10 reps  Sidelying Shoulder External Rotation - 2 x daily - 7 x weekly - 3 sets - 10 reps  Prone Shoulder Horizontal Abduction - 2 x daily - 7 x weekly - 3 sets - 10 reps  Prone Single Arm Shoulder Y - 2 x daily - 7 x weekly - 3 sets - 10 reps  Prone Shoulder Flexion - 2 x daily - 7 x weekly - 3 sets - 10 reps  Prone Scapular Retraction - 2 x daily - 7 x weekly - 3 sets - 10 reps - 5 sec hold  Standing Shoulder Row with Anchored Resistance - 2 x daily - 7 x weekly - 3 sets - 10 reps  Shoulder extension with resistance - Neutral - 2 x daily - 7 x weekly - 3 sets - 10 reps    Method of Education: [x] Verbal  [x] Demo  [] Written  Comprehension of Education:  [x] Verbalizes understanding. [x] Demonstrates understanding. [x] Needs review. [] Demonstrates/verbalizes HEP/Ed previously given. Plan: [x] Continue current frequency toward long and short term goals.     [x] Specific Instructions for subsequent treatments: Continue tx per POC      Time In: 1:02 pm              Time Out: 2:00 pm    Electronically signed by:  Sandip Kincaid PT

## 2022-02-16 ENCOUNTER — HOSPITAL ENCOUNTER (OUTPATIENT)
Dept: PHYSICAL THERAPY | Facility: CLINIC | Age: 67
Setting detail: THERAPIES SERIES
Discharge: HOME | End: 2022-02-16
Payer: MEDICARE

## 2022-02-16 PROCEDURE — 97110 THERAPEUTIC EXERCISES: CPT

## 2022-02-16 NOTE — DISCHARGE SUMMARY
[] [facility]) - [facility] &  Therapy  [address]  [phone]  F: [phone] [] [address]  [facility]   [address]  P: [phone]  F: [phone] [x] [address] &  Therapy  [address]  P: [phone]  F: [phone] [] [address]  P: [phone]  F: [phone] [] [address]  [facility]   [address]   [place]: [phone]  F: [phone]      Physical Therapy Daily Treatment Note    Date:  2022  Patient Name:  Harvey Cedillo    :  1955  MRN: [id number]   [address], CNP                                Insurance: [facility] Medicare (visits BMN)  Medical Diagnosis:   M25.511 (ICD-10-CM) - Right shoulder pain, unspecified chronicity   M25.521 (ICD-10-CM) - Right elbow pain   Rehab Codes: M54.6, M54.5, M62.81, R20.2, M25.511, M25.611, R29.3, M25.311, M25.521 Onset Date: 21 (thoracic pain) 2020 (shoulder pain)                  Next 's appt.: PRN  Visit# / total visits:  (2x wk)      Cancels/No Shows:   ALTERNATE BETWEEN SCRIPTS       Subjective: Pt arrived stating that she is feeling better. States that she saw a functional medicine doctor last week, where she received treatment for her shoulders, back and (B) LE. Patient states she was in increased pain following the treatment, however states her (R) shoulder is feeling good. States she has had some (R) elbow pain, however was able to manage with tennis ball DI to lateral epicondyle. Patient states she would like today to be her last day in physical therapy due to going back to see functional medicine doctor at this time.       Pain:  [x] Yes  [] No  Location: R shoulder, R elbow  Pain Rating: (0-10 scale) R) Shoulder: 2/10 at rest, (R) elbow:  3/10 at rest,   Pain altered Tx:  [x] No  [] Yes  Action:  Comments:    Objective:   ROM  °A/P END FEEL STRENGTH     Left Right   Left Right   Sit Shld Flex             Sit Shld Abd             Sit Shld IR             Shoulder Flex 150 150   5 5   Ext              160   5 5   ER @ 0 45 90       5 5   IR       5 5   Supraspinatus       5 4+   Infraspinatus       5 5   Serratus Ant       5 5   Mid Trap       4 3   Lower Trap       3 3   Elbow Flex. [facility] WFL   4+ 4+   Elbow Ext. [facility] WFL   4+ 4+   Wrist Flexion 60 60   5 5   Wrist extension 55 55   5 4+       Modalities:   Precautions:  Exercises:  Exercise Reps/ Time Weight/ Level Comments    UBE  6'    held --   Pulleys  3/3    Flex/scaption               UT S 3x30\"        Levator Scap S 3x30\"        Doorway Pec S 3x30\"        Wrist Flexion S 30\"x3        Wrist Ext S 30\"x3               Eccentric RD 2x10 2#     Eccentric Wrist ext 2x10 2# No weight 1/6               Wall Slides  10x3\"   Flex, Abd    Wand S  10x5\"    flex, abd, ER               SL Abd x20   x   SL HAB x20   x   SL ER x20   x   Prone Scaular Retractions 10x5\"     x   Prone Scapular IYT x15   x   Standing IYT x10 1lb  x   Cable Rows x15 15lbs  x   Cable Shoulder ext x15 25lbs  x   Lat Pull down 2x10 30lb  x   TB HAB  2x10 lime     TB D2 flexion 2x10 lime  x   TB wall clock x10 orange  x   Ball on wall       Other: DI (R) pec- 2'                                     Specific Instructions for next treatment: Continue tx per POC      Treatment Charges: Mins Units   []  Modalities: MHP     [x]  Ther Exercise 50 3   [x]  Manual Therapy 2 -   []  Ther Activities     []  Aquatics     []  Vasocompression     []  Other     Total Treatment time 52 3       Assessment: [x]      Due to reassessment during previous session did not complete full reassessment of patient's goals. Performed reassessment of pain tolerance, (R) shoulder AROM and (B) UE strength.  Patient demonstrates improvements in pain tolerance with lifting her purse, (R) shoulder abduction AROM and (R) UE strength. Patient to be discharged at this time due to patient having future sessions with functional medicine doctor and will continue with HEP at home. Reviewed HEP at end of session per ex log with good tolerance demonstrated by patient. Provided patient with ex that she can perform safely in the gym in order to improve scapular stability and shoulder strengthening. Patient reports increased clicking within (R) shoulder joint with scaption ex. Performed release to (R) pec in order to decrease clicking with noted tension throughout distal insertion. Good release and decreased clicking noted after pec release performed. Provided patient with updated HEP and theraband at end of session with no questions. STG: (to be met in 6 treatments)  1. ? Pain: Patient will report pain as 5/10 with lifting objects  5/10 pain with lifting objects (MET)   2. ? ROM: Patient will improve (R) shoulder elevation to 160 deg in order to increase ease of performing ADLs (R) shoulder flexion: 150 deg, (R) shoulder abd: 160 deg (ongoing)   3. Patient will improve wrist flexibility and AROM to equal (B) in order to increase ease of driving for work (MET)  4. Patient will improve (B) cervical SB to 30 deg in order to indicate improved upper trapezius length and improve scapulohumeral rhythm (R) cervical SB: 35 deg, (L) cervical SB: 30 deg (met)   5. Patient to be independent with home exercise program as demonstrated by performance with correct form without cues. Patient states completing ex at home on a regular basis (MET)     LTG: (to be met in 12 treatments)  1. Patient will improve (B) scapular strength to 4+/5 in order to increase ease of performing overhead tasks (ongoing)   2. Patient will improve (B) UE strength to 4+/5 in order to increase ease of performing lifting activities (MET)   3.  Patient will report pain as 2/10 with lifting objects Patient reports pain as 5/10 with lifting objects (ongoing)                     Patient goals: \"Decrease pain and not have to get another shot\"    Pt. Education:  [x] Yes  [] No  [x] Reviewed Prior HEP/Ed  Access Code: [email or web address]  URL: [email or web address]  Date: 02/16/2022  Prepared by: Hector Mckee  Sidelying Shoulder Abduction - 2 x daily - 7 x weekly - 3 sets - 10 reps  Sidelying Shoulder Horizontal Abduction - 2 x daily - 7 x weekly - 3 sets - 10 reps  Sidelying Shoulder External Rotation - 2 x daily - 7 x weekly - 3 sets - 10 reps  Prone Shoulder Horizontal Abduction - 2 x daily - 7 x weekly - 3 sets - 10 reps  Prone Single Arm Shoulder Y - 2 x daily - 7 x weekly - 3 sets - 10 reps  Prone Shoulder Flexion - 2 x daily - 7 x weekly - 3 sets - 10 reps  Prone Scapular Retraction - 2 x daily - 7 x weekly - 3 sets - 10 reps - 5 sec hold  Standing Shoulder Flexion to 90 Degrees with Dumbbells - 2 x daily - 7 x weekly - 3 sets - 10 reps  Scaption with Dumbbells - 2 x daily - 7 x weekly - 3 sets - 10 reps  Shoulder Abduction with Dumbbells - Thumbs Up - 2 x daily - 7 x weekly - 3 sets - 10 reps  Standing Shoulder Row with Anchored Resistance - 2 x daily - 7 x weekly - 3 sets - 10 reps  Shoulder extension with resistance - Neutral - 2 x daily - 7 x weekly - 3 sets - 10 reps  Standing Shoulder Horizontal Abduction with Resistance - 2 x daily - 7 x weekly - 3 sets - 10 reps  Standing Shoulder Single Arm PNF D2 Flexion with Resistance - 2 x daily - 7 x weekly - 3 sets - 10 reps  Wall Clock with Theraband - 2 x daily - 7 x weekly - 3 sets - 10 reps  Standing Wall [employer] with Mini Swiss Ball - 2 x daily - 7 x weekly - 3 sets - 10 reps  Lat Pull Down - 2 x daily - 7 x weekly - 3 sets - 10 reps      Method of Education: [x] Verbal  [x] Demo  [] Written  Comprehension of Education:  [x] Verbalizes understanding. [x] Demonstrates understanding. [x] Needs review.   [] Demonstrates/verbalizes HEP/Ed previously given.     Plan: Patient to be discharged at this time      Time In: 2:04 pm              Time Out: 2:58 pm    Electronically signed by:  Azeem Gonzales, PT

## 2022-07-27 ENCOUNTER — OFFICE VISIT (OUTPATIENT)
Dept: PRIMARY CARE CLINIC | Age: 67
End: 2022-07-27
Payer: MEDICARE

## 2022-07-27 VITALS
BODY MASS INDEX: 26.95 KG/M2 | SYSTOLIC BLOOD PRESSURE: 132 MMHG | DIASTOLIC BLOOD PRESSURE: 76 MMHG | OXYGEN SATURATION: 97 % | HEART RATE: 68 BPM | WEIGHT: 167 LBS

## 2022-07-27 DIAGNOSIS — M25.511 CHRONIC RIGHT SHOULDER PAIN: ICD-10-CM

## 2022-07-27 DIAGNOSIS — E11.9 TYPE 2 DIABETES MELLITUS WITHOUT COMPLICATION, WITHOUT LONG-TERM CURRENT USE OF INSULIN (HCC): Primary | ICD-10-CM

## 2022-07-27 DIAGNOSIS — M54.6 ACUTE LEFT-SIDED THORACIC BACK PAIN: ICD-10-CM

## 2022-07-27 DIAGNOSIS — G89.29 CHRONIC RIGHT SHOULDER PAIN: ICD-10-CM

## 2022-07-27 PROCEDURE — 99214 OFFICE O/P EST MOD 30 MIN: CPT | Performed by: NURSE PRACTITIONER

## 2022-07-27 PROCEDURE — 1123F ACP DISCUSS/DSCN MKR DOCD: CPT | Performed by: NURSE PRACTITIONER

## 2022-07-27 PROCEDURE — 3044F HG A1C LEVEL LT 7.0%: CPT | Performed by: NURSE PRACTITIONER

## 2022-07-27 RX ORDER — SEMAGLUTIDE 1.34 MG/ML
INJECTION, SOLUTION SUBCUTANEOUS
COMMUNITY
Start: 2022-07-21 | End: 2022-07-27

## 2022-07-27 ASSESSMENT — ENCOUNTER SYMPTOMS
SHORTNESS OF BREATH: 0
COUGH: 0
BACK PAIN: 0
ABDOMINAL PAIN: 0

## 2022-07-27 ASSESSMENT — PATIENT HEALTH QUESTIONNAIRE - PHQ9
SUM OF ALL RESPONSES TO PHQ QUESTIONS 1-9: 0
SUM OF ALL RESPONSES TO PHQ QUESTIONS 1-9: 0
2. FEELING DOWN, DEPRESSED OR HOPELESS: 0
1. LITTLE INTEREST OR PLEASURE IN DOING THINGS: 0
SUM OF ALL RESPONSES TO PHQ QUESTIONS 1-9: 0
SUM OF ALL RESPONSES TO PHQ9 QUESTIONS 1 & 2: 0
SUM OF ALL RESPONSES TO PHQ QUESTIONS 1-9: 0

## 2022-07-27 NOTE — PROGRESS NOTES
[address] PRIMARY CARE  [address] [facility]  [address] [address]  Dept: [phone]  Dept Fax: [phone]    Junaid Ritchie is a 74 y.o. female who presentstoday for her medical conditions/complaints as noted below. Junaid Ritchie is c/o of  Chief Complaint   Patient presents with    Shoulder Pain     6 mo f/u    Diabetes           HPI:     Presents for 6 month recheck on chronic conditions  BP well controlled  Weight is stable  Frustrated as she is compliant with diet/exercises regularly  Using ozempic at 1mg weekly    Continued shoulder and mid back pain  Has found relief with functional medicine and their approach  Has had 2 treatments with relief for up to 2 weeks after treatment  Plans to continue to follow with them  Feels it may be related to her posture while driving, has made adjustments and makes sure to take breaks while driving  Her  will be returning to help and she should have a reduction in hours    Denies any other problems/concerns      Hemoglobin A1C (%)   Date Value   2022 6.8   2021 6.6   2021 6.2             ( goal A1C is < 7)   Microalb/Crt.  Ratio (mcg/mg creat)   Date Value   2017 6     LDL Cholesterol (mg/dL)   Date Value   2020 94   2019 81   03/15/2018 101       (goal LDL is <100)   AST (U/L)   Date Value   2020 22     ALT (U/L)   Date Value   2020 20     BUN (mg/dL)   Date Value   2021 15     BP Readings from Last 3 Encounters:   22 132/76   22 122/80   21 126/76          ([id number])    Past Medical History:   Diagnosis Date    Diabetes mellitus ([address]     Fibromyalgia     GERD (gastroesophageal reflux disease)       Past Surgical History:   Procedure Laterality Date    CARPAL TUNNEL RELEASE      FINGER TRIGGER RELEASE Right 2018    ring    FINGER TRIGGER RELEASE N/A 2018    RIGHT RING FINGER TRIGGER RELEASE performed by Nathanael Meigs, DO at [facility]    TUBAL LIGATION      ULNAR TUNNEL RELEASE Bilateral        Family History   Problem Relation Age of Onset    Diabetes Maternal Grandmother     Diabetes Maternal Grandfather           Social History     Tobacco Use    Smoking status: Former     Packs/day: 1.50     Years: 30.00     Pack years: 45.00     Types: Cigarettes     Quit date: 2005     Years since quittin.1    Smokeless tobacco: Never   Substance Use Topics    Alcohol use: No      Current Outpatient Medications   Medication Sig Dispense Refill    omeprazole (PRILOSEC) 10 MG delayed release capsule Take 10 mg by mouth daily      Semaglutide, 1 MG/DOSE, (OZEMPIC, 1 MG/DOSE,) 2 MG/1.5ML SOPN Inject 1 mg into the skin once a week 5 pen 3    Handicap Placard MISC by Does not apply route Expires 2025 1 each 0    glucose blood VI test strips (ASCENSIA AUTODISC VI;ONE TOUCH ULTRA TEST VI) strip 1 each by Other route daily Use with associated glucose meter. 100 strip 11     No current facility-administered medications for this visit.      Allergies   Allergen Reactions    Codeine Nausea Only       Health Maintenance   Topic Date Due    Annual Wellness Visit (AWV)  Never done    COVID-19 Vaccine (1) Never done    Diabetic retinal exam  2018    Colorectal Cancer Screen  2018    Lipids  2021    Diabetic foot exam  2022    DTaP/Tdap/Td vaccine (1 - Tdap) 2022 (Originally 1974)    Shingles vaccine (1 of 2) 2023 (Originally 2003)    Pneumococcal 65+ years Vaccine (2 - PCV) 2022    Flu vaccine (1) 2022    A1C test (Diabetic or Prediabetic)  2023    Diabetic microalbuminuria test  2023    Depression Screen  2023    Breast cancer screen  2023    DEXA (modify frequency per FRAX score)  Completed    Hepatitis A vaccine  Aged Out    Hib vaccine  Aged Out    Meningococcal (ACWY) vaccine  Aged Out    Hepatitis C screen  Discontinued       Subjective:      Review of Systems   Constitutional:  Negative for chills, fatigue and fever. HENT:  Negative for congestion. Eyes:  Negative for visual disturbance. Respiratory:  Negative for cough and shortness of breath. Cardiovascular:  Negative for chest pain and palpitations. Gastrointestinal:  Negative for abdominal pain. Genitourinary:  Negative for dysuria. Musculoskeletal:  Positive for myalgias. Negative for back pain. Neurological:  Negative for dizziness, numbness and headaches. Psychiatric/Behavioral:  Positive for sleep disturbance. Negative for self-injury and suicidal ideas. The patient is not nervous/anxious. Objective:     Physical Exam  Vitals and nursing note reviewed. Constitutional:       Appearance: She is well-developed. HENT:      Head: Normocephalic and atraumatic. Eyes:      Pupils: Pupils are equal, round, and reactive to light. Cardiovascular:      Rate and Rhythm: Normal rate and regular rhythm. Heart sounds: Normal heart sounds. Pulmonary:      Effort: Pulmonary effort is normal.      Breath sounds: Normal breath sounds. Abdominal:      General: Bowel sounds are normal.      Palpations: Abdomen is soft. Tenderness: There is no abdominal tenderness. Musculoskeletal:         General: Normal range of motion. Cervical back: Normal range of motion and neck supple. Skin:     General: Skin is warm and dry. Neurological:      Mental Status: She is alert and oriented to person, place, and time. Psychiatric:         Behavior: Behavior normal.         Thought Content: Thought content normal.         Judgment: Judgment normal.     /76   Pulse 68   Wt 167 lb (75.8 kg)   SpO2 97%   BMI 26.95 kg/m²     Assessment:       Diagnosis Orders   1. Type 2 diabetes mellitus without complication, without long-term current use of insulin ([address]        2. Chronic right shoulder pain        3.  Acute left-sided thoracic back pain                  Plan:      Return in about 6 months (around 1/27/2023) for annual exam.      Chronic conditions- Stable. Continue diet/exercise. Continue current meds. Follow with functional medicine as planned. Follow up in six months for recheck/earlier if needed. Patient given educational materials - see patient instructions. Discussed use, benefit, and side effects of prescribed medications. All patientquestions answered. Pt voiced understanding. Reviewed health maintenance. Instructedto continue current medications, diet and exercise. Patient agreed with treatmentplan. Follow up as directed.      Electronicallysigned by DEISY Schreiber CNP on 7/27/2022 at 9:45 AM

## 2022-10-12 RX ORDER — SEMAGLUTIDE 1.34 MG/ML
INJECTION, SOLUTION SUBCUTANEOUS
Qty: 15 ML | Refills: 2 | Status: SHIPPED | OUTPATIENT
Start: 2022-10-12

## 2023-01-27 ENCOUNTER — OFFICE VISIT (OUTPATIENT)
Dept: PRIMARY CARE CLINIC | Age: 68
End: 2023-01-27

## 2023-01-27 ENCOUNTER — HOSPITAL ENCOUNTER (OUTPATIENT)
Age: 68
Setting detail: SPECIMEN
Discharge: HOME | End: 2023-01-27

## 2023-01-27 VITALS
BODY MASS INDEX: 27.51 KG/M2 | SYSTOLIC BLOOD PRESSURE: 130 MMHG | RESPIRATION RATE: 12 BRPM | OXYGEN SATURATION: 97 % | HEIGHT: 66 IN | WEIGHT: 171.2 LBS | HEART RATE: 76 BPM | DIASTOLIC BLOOD PRESSURE: 80 MMHG

## 2023-01-27 DIAGNOSIS — J44.9 CHRONIC OBSTRUCTIVE PULMONARY DISEASE, UNSPECIFIED COPD TYPE (HCC): ICD-10-CM

## 2023-01-27 DIAGNOSIS — E78.5 HYPERLIPIDEMIA, UNSPECIFIED HYPERLIPIDEMIA TYPE: ICD-10-CM

## 2023-01-27 DIAGNOSIS — E11.9 TYPE 2 DIABETES MELLITUS WITHOUT COMPLICATION, WITHOUT LONG-TERM CURRENT USE OF INSULIN (HCC): Primary | ICD-10-CM

## 2023-01-27 DIAGNOSIS — E11.9 TYPE 2 DIABETES MELLITUS WITHOUT COMPLICATION, WITHOUT LONG-TERM CURRENT USE OF INSULIN (HCC): ICD-10-CM

## 2023-01-27 DIAGNOSIS — R53.83 OTHER FATIGUE: ICD-10-CM

## 2023-01-27 DIAGNOSIS — I48.91 ATRIAL FIBRILLATION, UNSPECIFIED TYPE (HCC): ICD-10-CM

## 2023-01-27 LAB
ALBUMIN SERPL-MCNC: 4.1 G/DL (ref 3.5–5.2)
ALBUMIN/GLOBULIN RATIO: 1.4 (ref 1–2.5)
ALP BLD-CCNC: 78 U/L (ref 35–104)
ALT SERPL-CCNC: 43 U/L (ref 5–33)
ANION GAP SERPL CALCULATED.3IONS-SCNC: 11 MMOL/L (ref 9–17)
AST SERPL-CCNC: 34 U/L
BILIRUB SERPL-MCNC: 0.6 MG/DL (ref 0.3–1.2)
BUN BLDV-MCNC: 15 MG/DL (ref 8–23)
CALCIUM SERPL-MCNC: 9.2 MG/DL (ref 8.6–10.4)
CHLORIDE BLD-SCNC: 102 MMOL/L (ref 98–107)
CHOLESTEROL/HDL RATIO: 3.5
CHOLESTEROL: 156 MG/DL
CO2: 26 MMOL/L (ref 20–31)
CREAT SERPL-MCNC: 0.69 MG/DL (ref 0.5–0.9)
CREATININE URINE: 84.7 MG/DL (ref 28–217)
GFR SERPL CREATININE-BSD FRML MDRD: >60 ML/MIN/1.73M2
GLUCOSE BLD-MCNC: 195 MG/DL (ref 70–99)
HBA1C MFR BLD: 6.7 %
HCT VFR BLD CALC: 41.6 % (ref 36.3–47.1)
HDLC SERPL-MCNC: 45 MG/DL
HEMOGLOBIN: 13.9 G/DL (ref 11.9–15.1)
LDL CHOLESTEROL: 90 MG/DL (ref 0–130)
MCH RBC QN AUTO: 29.7 PG (ref 25.2–33.5)
MCHC RBC AUTO-ENTMCNC: 33.4 G/DL (ref 28.4–34.8)
MCV RBC AUTO: 88.9 FL (ref 82.6–102.9)
MICROALBUMIN/CREAT 24H UR: <12 MG/L
MICROALBUMIN/CREAT UR-RTO: NORMAL MCG/MG CREAT
NRBC AUTOMATED: 0 PER 100 WBC
PDW BLD-RTO: 12.2 % (ref 11.8–14.4)
PLATELET # BLD: 250 K/UL (ref 138–453)
PMV BLD AUTO: 10 FL (ref 8.1–13.5)
POTASSIUM SERPL-SCNC: 4.3 MMOL/L (ref 3.7–5.3)
RBC # BLD: 4.68 M/UL (ref 3.95–5.11)
SODIUM BLD-SCNC: 139 MMOL/L (ref 135–144)
TOTAL PROTEIN: 7 G/DL (ref 6.4–8.3)
TRIGL SERPL-MCNC: 106 MG/DL
TSH SERPL DL<=0.05 MIU/L-ACNC: 1.27 UIU/ML (ref 0.3–5)
WBC # BLD: 5.9 K/UL (ref 3.5–11.3)

## 2023-01-27 RX ORDER — OMEPRAZOLE 10 MG/1
10 CAPSULE, DELAYED RELEASE ORAL DAILY
Qty: 90 CAPSULE | Refills: 3 | Status: SHIPPED | OUTPATIENT
Start: 2023-01-27

## 2023-01-27 RX ORDER — SEMAGLUTIDE 1.34 MG/ML
INJECTION, SOLUTION SUBCUTANEOUS
Qty: 15 ML | Refills: 3 | Status: SHIPPED | OUTPATIENT
Start: 2023-01-27

## 2023-01-27 SDOH — ECONOMIC STABILITY: FOOD INSECURITY: WITHIN THE PAST 12 MONTHS, YOU WORRIED THAT YOUR FOOD WOULD RUN OUT BEFORE YOU GOT MONEY TO BUY MORE.: NEVER TRUE

## 2023-01-27 SDOH — ECONOMIC STABILITY: FOOD INSECURITY: WITHIN THE PAST 12 MONTHS, THE FOOD YOU BOUGHT JUST DIDN'T LAST AND YOU DIDN'T HAVE MONEY TO GET MORE.: NEVER TRUE

## 2023-01-27 ASSESSMENT — PATIENT HEALTH QUESTIONNAIRE - PHQ9
SUM OF ALL RESPONSES TO PHQ QUESTIONS 1-9: 0
SUM OF ALL RESPONSES TO PHQ9 QUESTIONS 1 & 2: 0
2. FEELING DOWN, DEPRESSED OR HOPELESS: 0
1. LITTLE INTEREST OR PLEASURE IN DOING THINGS: 0

## 2023-01-27 ASSESSMENT — SOCIAL DETERMINANTS OF HEALTH (SDOH): HOW HARD IS IT FOR YOU TO PAY FOR THE VERY BASICS LIKE FOOD, HOUSING, MEDICAL CARE, AND HEATING?: NOT HARD AT ALL

## 2023-01-27 ASSESSMENT — LIFESTYLE VARIABLES
HOW OFTEN DO YOU HAVE A DRINK CONTAINING ALCOHOL: NEVER
HOW MANY STANDARD DRINKS CONTAINING ALCOHOL DO YOU HAVE ON A TYPICAL DAY: PATIENT DOES NOT DRINK

## 2023-01-27 NOTE — PROGRESS NOTES
Medicare Annual Wellness Visit    Lexi Glasgow is here for Medicare AWV    Assessment & Plan   Type 2 diabetes mellitus without complication, without long-term current use of insulin (HCC)  -     POCT glycosylated hemoglobin (Hb A1C)  -     Comprehensive Metabolic Panel; Future  -     Microalbumin, Ur; Future  Other fatigue  -     CBC; Future  -     TSH with Reflex; Future  Hyperlipidemia, unspecified hyperlipidemia type  -     Lipid Panel; Future  Chronic obstructive pulmonary disease, unspecified COPD type ([facility]  Atrial fibrillation, unspecified type [facility])    Recommendations for Preventive Services Due: see orders and patient instructions/AVS.  Recommended screening schedule for the next 5-10 years is provided to the patient in written form: see Patient Instructions/AVS.     Return in about 6 months (around 7/27/2023) for Diabetes. Subjective   The following acute and/or chronic problems were also addressed today:  See below    Patient's complete Health Risk Assessment and screening values have been reviewed and are found in Flowsheets. The following problems were reviewed today and where indicated follow up appointments were made and/or referrals ordered. Positive Risk Factor Screenings with Interventions:                         No issues identified              Objective   Vitals:    01/27/23 1024   BP: 130/80   Pulse: 76   Resp: 12   SpO2: 97%   Weight: 171 lb 3.2 oz (77.7 kg)   Height: 5' 6\" (1.676 m)      Body mass index is 27.63 kg/m².       General Appearance: alert and oriented to person, place and time, well developed and well- nourished, in no acute distress  Skin: warm and dry, no rash or erythema  Head: normocephalic and atraumatic  Eyes: pupils equal, round, and reactive to light, extraocular eye movements intact, conjunctivae normal  ENT: tympanic membrane, external ear and ear canal normal bilaterally, nose without deformity, nasal mucosa and turbinates normal without polyps  Neck: supple and non-tender without mass, no thyromegaly or thyroid nodules, no cervical lymphadenopathy  Pulmonary/Chest: clear to auscultation bilaterally- no wheezes, rales or rhonchi, normal air movement, no respiratory distress  Cardiovascular: normal rate, regular rhythm, normal S1 and S2, no murmurs, rubs, clicks, or gallops, distal pulses intact, no carotid bruits  Abdomen: soft, non-tender, non-distended, normal bowel sounds, no masses or organomegaly  Extremities: no cyanosis, clubbing or edema  Musculoskeletal: normal range of motion, no joint swelling, deformity or tenderness  Neurologic: reflexes normal and symmetric, no cranial nerve deficit, gait, coordination and speech normal       Allergies   Allergen Reactions    Codeine Nausea Only     Prior to Visit Medications    Medication Sig Taking? Authorizing Provider   Semaglutide, 1 MG/DOSE, (OZEMPIC, 1 MG/DOSE,) 4 MG/3ML SOPN INJECT 1 MG UNDER THE SKIN ONCE A WEEK Yes DEISY Rogers CNP   omeprazole (PRILOSEC) 10 MG delayed release capsule Take 1 capsule by mouth daily Yes DEISY Rogers CNP   Handicap Placard MISC by Does not apply route Expires 4/2025 Yes DEISY Rogers CNP   glucose blood VI test strips (ASCENSIA AUTODISC VI;ONE TOUCH ULTRA TEST VI) strip 1 each by Other route daily Use with associated glucose meter.  Yes DEISY Rogers CNP       CareTeam (Including outside providers/suppliers regularly involved in providing care):   Patient Care Team:  DEISY Rogers CNP as PCP - General (Nurse Practitioner)  DEISY Rogers CNP as PCP - [facility] Empaneled Provider     Reviewed and updated this visit:  Tobacco  Allergies  Meds  Problems  Med Hx  Surg Hx  Soc Hx  Fam Hx               Presents for AWV  BP well controlled  Weight is stable    Hga1c from 6.8 to 6.7  Compliant with meds  Denies any side effects    Shoulder pain and mid back pain have improved  Continues with functional medicine  Feels the best she has in a long time, received her  certification  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

## 2023-05-18 ENCOUNTER — TELEPHONE (OUTPATIENT)
Dept: PHARMACY | Facility: CLINIC | Age: 68
End: 2023-05-18

## 2023-05-19 ENCOUNTER — OFFICE VISIT (OUTPATIENT)
Dept: PRIMARY CARE CLINIC | Age: 68
End: 2023-05-19
Payer: MEDICARE

## 2023-05-19 VITALS
WEIGHT: 170.2 LBS | BODY MASS INDEX: 27.47 KG/M2 | SYSTOLIC BLOOD PRESSURE: 132 MMHG | HEART RATE: 70 BPM | RESPIRATION RATE: 15 BRPM | DIASTOLIC BLOOD PRESSURE: 84 MMHG | OXYGEN SATURATION: 99 %

## 2023-05-19 DIAGNOSIS — Z12.31 ENCOUNTER FOR SCREENING MAMMOGRAM FOR BREAST CANCER: ICD-10-CM

## 2023-05-19 DIAGNOSIS — R10.32 LLQ PAIN: Primary | ICD-10-CM

## 2023-05-19 DIAGNOSIS — Z12.11 COLON CANCER SCREENING: ICD-10-CM

## 2023-05-19 DIAGNOSIS — K76.0 HEPATIC STEATOSIS: ICD-10-CM

## 2023-05-19 PROCEDURE — G8417 CALC BMI ABV UP PARAM F/U: HCPCS | Performed by: NURSE PRACTITIONER

## 2023-05-19 PROCEDURE — 1123F ACP DISCUSS/DSCN MKR DOCD: CPT | Performed by: NURSE PRACTITIONER

## 2023-05-19 PROCEDURE — 1036F TOBACCO NON-USER: CPT | Performed by: NURSE PRACTITIONER

## 2023-05-19 PROCEDURE — 3075F SYST BP GE 130 - 139MM HG: CPT | Performed by: NURSE PRACTITIONER

## 2023-05-19 PROCEDURE — G8427 DOCREV CUR MEDS BY ELIG CLIN: HCPCS | Performed by: NURSE PRACTITIONER

## 2023-05-19 PROCEDURE — G8399 PT W/DXA RESULTS DOCUMENT: HCPCS | Performed by: NURSE PRACTITIONER

## 2023-05-19 PROCEDURE — 3079F DIAST BP 80-89 MM HG: CPT | Performed by: NURSE PRACTITIONER

## 2023-05-19 PROCEDURE — 1090F PRES/ABSN URINE INCON ASSESS: CPT | Performed by: NURSE PRACTITIONER

## 2023-05-19 PROCEDURE — 3017F COLORECTAL CA SCREEN DOC REV: CPT | Performed by: NURSE PRACTITIONER

## 2023-05-19 PROCEDURE — 99214 OFFICE O/P EST MOD 30 MIN: CPT | Performed by: NURSE PRACTITIONER

## 2023-05-19 SDOH — ECONOMIC STABILITY: FOOD INSECURITY: WITHIN THE PAST 12 MONTHS, YOU WORRIED THAT YOUR FOOD WOULD RUN OUT BEFORE YOU GOT MONEY TO BUY MORE.: NEVER TRUE

## 2023-05-19 SDOH — ECONOMIC STABILITY: HOUSING INSECURITY
IN THE LAST 12 MONTHS, WAS THERE A TIME WHEN YOU DID NOT HAVE A STEADY PLACE TO SLEEP OR SLEPT IN A SHELTER (INCLUDING NOW)?: NO

## 2023-05-19 SDOH — ECONOMIC STABILITY: FOOD INSECURITY: WITHIN THE PAST 12 MONTHS, THE FOOD YOU BOUGHT JUST DIDN'T LAST AND YOU DIDN'T HAVE MONEY TO GET MORE.: NEVER TRUE

## 2023-05-19 SDOH — ECONOMIC STABILITY: INCOME INSECURITY: HOW HARD IS IT FOR YOU TO PAY FOR THE VERY BASICS LIKE FOOD, HOUSING, MEDICAL CARE, AND HEATING?: NOT HARD AT ALL

## 2023-05-19 ASSESSMENT — ENCOUNTER SYMPTOMS
BACK PAIN: 0
DIARRHEA: 0
ABDOMINAL PAIN: 1
SHORTNESS OF BREATH: 0
COUGH: 0
CONSTIPATION: 0
NAUSEA: 0

## 2023-05-19 ASSESSMENT — PATIENT HEALTH QUESTIONNAIRE - PHQ9
SUM OF ALL RESPONSES TO PHQ QUESTIONS 1-9: 0
2. FEELING DOWN, DEPRESSED OR HOPELESS: 0
SUM OF ALL RESPONSES TO PHQ QUESTIONS 1-9: 0
SUM OF ALL RESPONSES TO PHQ9 QUESTIONS 1 & 2: 0
1. LITTLE INTEREST OR PLEASURE IN DOING THINGS: 0
SUM OF ALL RESPONSES TO PHQ QUESTIONS 1-9: 0
SUM OF ALL RESPONSES TO PHQ QUESTIONS 1-9: 0

## 2023-05-19 NOTE — PROGRESS NOTES
[address] PRIMARY CARE  [address] [facility]  [address] [address]  Dept: [phone]  Dept Fax: [phone]    Arslan Noe is a 69 y.o. female who presentstoday for her medical conditions/complaints as noted below. Arslan Noe is c/o of  Chief Complaint   Patient presents with    Abdominal Pain     Lower left abdominal pain, and under rib cage    Back Pain     Lower left           HPI:     Presents for acute concern  BP well controlled  Weight is stable    C/o LLQ pain that comes and goes  Has point tenderness, not a doubling over pain but a pain that is present  Denies any changes to bladder/bowel  CT done 2021- diverticulosis and fatty liver noted  Recent labs 2023, WNL, mildly elevated liver enzymes  Occasional epigastric pain  Left sided pain that makes it hard for her to sleep  Her concern is that the pain has been present since   Would like to meet with GI for eval    Willing to update mammogram  Encouraged her to review colon cancer screening options with GI    Denies any other problems/concerns      Hemoglobin A1C (%)   Date Value   2023 6.7   2022 6.8   2021 6.6             ( goal A1C is < 7)   Microalb/Crt.  Ratio (mcg/mg creat)   Date Value   2023 Can not be calculated     LDL Cholesterol (mg/dL)   Date Value   2023 90   2020 94   2019 81       (goal LDL is <100)   AST (U/L)   Date Value   2023 34 (H)     ALT (U/L)   Date Value   2023 43 (H)     BUN (mg/dL)   Date Value   2023 15     BP Readings from Last 3 Encounters:   23 132/84   23 130/80   22 132/76          ([id number])    Past Medical History:   Diagnosis Date    Diabetes mellitus (HCC)     Fibromyalgia     GERD (gastroesophageal reflux disease)       Past Surgical History:   Procedure Laterality Date    CARPAL TUNNEL RELEASE      FINGER TRIGGER RELEASE Right 2018    ring    FINGER TRIGGER RELEASE N/A

## 2023-05-22 NOTE — TELEPHONE ENCOUNTER
[facility] [place] [facility] CLINICAL PHARMACY REVIEW: [facility]    Provider opened and \"doned\" encounter. 05/19/2023 visit appears to focus on acute issue of LLQ pain. Added appointment note for next visit on 07/28/2023. DIABETES ADHERENCE    Insurance Records claims through 05/15/2023 (Prior Year [place] = not reported; YTD [place] Kwon = 100%; Potential Fail Date: 09/19/2023): Ozempic (injectable semaglutide) 1mg weekly last filled on 04/13/2023 for 84 day supply.  Next refill due: 07/06/2023    Last Rx sent on 01/27/2023 for 84ds with 3 refills    Per Reconciled Dispense Report as of 05/18/2023:  Filled for 84ds on 01/27/2023, 04/13/2023 - 2 refills remain     Beatrice Devlin, PharmD, BCACP, [address]   Department, toll free: [phone], option 1    =======================================================    For Pharmacy Admin Tracking Only  Program: [address] in place:  No  Recommendation Provided To: Provider: 1 via Note to Provider  Intervention Detail: New Rx: 1, reason: Needs Additional Therapy  Intervention Accepted By: Provider: 0  Gap Closed?: No   Time Spent (min): 45

## 2023-06-01 ENCOUNTER — HOSPITAL ENCOUNTER (OUTPATIENT)
Dept: MAMMOGRAPHY | Age: 68
Discharge: HOME | End: 2023-06-03
Payer: MEDICARE

## 2023-06-01 DIAGNOSIS — Z12.31 ENCOUNTER FOR SCREENING MAMMOGRAM FOR BREAST CANCER: ICD-10-CM

## 2023-06-01 PROCEDURE — 77063 BREAST TOMOSYNTHESIS BI: CPT

## 2023-06-08 ENCOUNTER — HOSPITAL ENCOUNTER (OUTPATIENT)
Dept: CT IMAGING | Age: 68
Discharge: HOME | End: 2023-06-10
Payer: MEDICARE

## 2023-06-08 DIAGNOSIS — R10.32 LLQ ABDOMINAL PAIN: ICD-10-CM

## 2023-06-08 LAB
CREAT SERPL-MCNC: 0.66 MG/DL (ref 0.5–0.9)
GFR SERPL CREATININE-BSD FRML MDRD: >60 ML/MIN/1.73M2

## 2023-06-08 PROCEDURE — 74177 CT ABD & PELVIS W/CONTRAST: CPT

## 2023-06-08 PROCEDURE — 2580000003 HC RX 258: Performed by: NURSE PRACTITIONER

## 2023-06-08 PROCEDURE — 2500000003 HC RX 250 WO HCPCS: Performed by: NURSE PRACTITIONER

## 2023-06-08 PROCEDURE — 6360000004 HC RX CONTRAST MEDICATION: Performed by: NURSE PRACTITIONER

## 2023-06-08 PROCEDURE — 36415 COLL VENOUS BLD VENIPUNCTURE: CPT

## 2023-06-08 PROCEDURE — 82565 ASSAY OF CREATININE: CPT

## 2023-06-08 RX ORDER — 0.9 % SODIUM CHLORIDE 0.9 %
80 INTRAVENOUS SOLUTION INTRAVENOUS ONCE
Status: COMPLETED | OUTPATIENT
Start: 2023-06-08 | End: 2023-06-08

## 2023-06-08 RX ORDER — SODIUM CHLORIDE 0.9 % (FLUSH) 0.9 %
10 SYRINGE (ML) INJECTION PRN
Status: DISCONTINUED | OUTPATIENT
Start: 2023-06-08 | End: 2023-06-11 | Stop reason: HOSPADM

## 2023-06-08 RX ADMIN — SODIUM CHLORIDE 80 ML: 9 INJECTION, SOLUTION INTRAVENOUS at 10:19

## 2023-06-08 RX ADMIN — SODIUM CHLORIDE, PRESERVATIVE FREE 10 ML: 5 INJECTION INTRAVENOUS at 10:20

## 2023-06-08 RX ADMIN — BARIUM SULFATE 450 ML: 20 SUSPENSION ORAL at 10:20

## 2023-06-08 RX ADMIN — IOPAMIDOL 75 ML: 755 INJECTION, SOLUTION INTRAVENOUS at 10:19

## 2023-08-01 ENCOUNTER — OFFICE VISIT (OUTPATIENT)
Dept: PRIMARY CARE CLINIC | Age: 68
End: 2023-08-01
Payer: MEDICARE

## 2023-08-01 VITALS
WEIGHT: 168 LBS | RESPIRATION RATE: 10 BRPM | DIASTOLIC BLOOD PRESSURE: 76 MMHG | HEIGHT: 66 IN | OXYGEN SATURATION: 97 % | SYSTOLIC BLOOD PRESSURE: 116 MMHG | BODY MASS INDEX: 27 KG/M2 | HEART RATE: 67 BPM

## 2023-08-01 DIAGNOSIS — M54.6 ACUTE LEFT-SIDED THORACIC BACK PAIN: ICD-10-CM

## 2023-08-01 DIAGNOSIS — E11.9 TYPE 2 DIABETES MELLITUS WITHOUT COMPLICATION, WITHOUT LONG-TERM CURRENT USE OF INSULIN (HCC): Primary | ICD-10-CM

## 2023-08-01 PROCEDURE — G8427 DOCREV CUR MEDS BY ELIG CLIN: HCPCS | Performed by: NURSE PRACTITIONER

## 2023-08-01 PROCEDURE — 3017F COLORECTAL CA SCREEN DOC REV: CPT | Performed by: NURSE PRACTITIONER

## 2023-08-01 PROCEDURE — G8399 PT W/DXA RESULTS DOCUMENT: HCPCS | Performed by: NURSE PRACTITIONER

## 2023-08-01 PROCEDURE — 2022F DILAT RTA XM EVC RTNOPTHY: CPT | Performed by: NURSE PRACTITIONER

## 2023-08-01 PROCEDURE — 83037 HB GLYCOSYLATED A1C HOME DEV: CPT | Performed by: NURSE PRACTITIONER

## 2023-08-01 PROCEDURE — 1123F ACP DISCUSS/DSCN MKR DOCD: CPT | Performed by: NURSE PRACTITIONER

## 2023-08-01 PROCEDURE — 3074F SYST BP LT 130 MM HG: CPT | Performed by: NURSE PRACTITIONER

## 2023-08-01 PROCEDURE — 3044F HG A1C LEVEL LT 7.0%: CPT | Performed by: NURSE PRACTITIONER

## 2023-08-01 PROCEDURE — G8417 CALC BMI ABV UP PARAM F/U: HCPCS | Performed by: NURSE PRACTITIONER

## 2023-08-01 PROCEDURE — 99214 OFFICE O/P EST MOD 30 MIN: CPT | Performed by: NURSE PRACTITIONER

## 2023-08-01 PROCEDURE — 1090F PRES/ABSN URINE INCON ASSESS: CPT | Performed by: NURSE PRACTITIONER

## 2023-08-01 PROCEDURE — 3078F DIAST BP <80 MM HG: CPT | Performed by: NURSE PRACTITIONER

## 2023-08-01 PROCEDURE — 1036F TOBACCO NON-USER: CPT | Performed by: NURSE PRACTITIONER

## 2023-08-01 RX ORDER — SEMAGLUTIDE 2.68 MG/ML
2 INJECTION, SOLUTION SUBCUTANEOUS WEEKLY
Qty: 6 ML | Refills: 3 | Status: SHIPPED | OUTPATIENT
Start: 2023-08-01

## 2023-08-01 RX ORDER — TIZANIDINE 4 MG/1
4 TABLET ORAL 3 TIMES DAILY
Qty: 30 TABLET | Refills: 0 | Status: SHIPPED | OUTPATIENT
Start: 2023-08-01

## 2023-08-01 SDOH — ECONOMIC STABILITY: FOOD INSECURITY: WITHIN THE PAST 12 MONTHS, YOU WORRIED THAT YOUR FOOD WOULD RUN OUT BEFORE YOU GOT MONEY TO BUY MORE.: NEVER TRUE

## 2023-08-01 SDOH — ECONOMIC STABILITY: FOOD INSECURITY: WITHIN THE PAST 12 MONTHS, THE FOOD YOU BOUGHT JUST DIDN'T LAST AND YOU DIDN'T HAVE MONEY TO GET MORE.: NEVER TRUE

## 2023-08-01 SDOH — ECONOMIC STABILITY: INCOME INSECURITY: HOW HARD IS IT FOR YOU TO PAY FOR THE VERY BASICS LIKE FOOD, HOUSING, MEDICAL CARE, AND HEATING?: NOT HARD AT ALL

## 2023-08-01 ASSESSMENT — PATIENT HEALTH QUESTIONNAIRE - PHQ9
SUM OF ALL RESPONSES TO PHQ QUESTIONS 1-9: 0
2. FEELING DOWN, DEPRESSED OR HOPELESS: 0
SUM OF ALL RESPONSES TO PHQ QUESTIONS 1-9: 0
SUM OF ALL RESPONSES TO PHQ9 QUESTIONS 1 & 2: 0
SUM OF ALL RESPONSES TO PHQ QUESTIONS 1-9: 0
1. LITTLE INTEREST OR PLEASURE IN DOING THINGS: 0
SUM OF ALL RESPONSES TO PHQ QUESTIONS 1-9: 0

## 2023-08-01 ASSESSMENT — ENCOUNTER SYMPTOMS
BACK PAIN: 1
ABDOMINAL PAIN: 0
SHORTNESS OF BREATH: 0
COUGH: 0

## 2023-08-01 NOTE — PROGRESS NOTES
by Lidia Ibarra DO at [address] [place] [address] Bilateral        Family History   Problem Relation Age of Onset    Diabetes Maternal Grandmother     Diabetes Maternal Grandfather           Social History     Tobacco Use    Smoking status: Former     Packs/day: 1.50     Years: 30.00     Pack years: 45.00     Types: Cigarettes     Quit date: 2005     Years since quittin.2    Smokeless tobacco: Never   Substance Use Topics    Alcohol use: No      Current Outpatient Medications   Medication Sig Dispense Refill    tiZANidine (ZANAFLEX) 4 MG tablet Take 1 tablet by mouth 3 times daily 30 tablet 0    Semaglutide, 2 MG/DOSE, (OZEMPIC, 2 MG/DOSE,) 8 MG/3ML SOPN Inject 2 mg into the skin once a week 6 mL 3    Semaglutide, 1 MG/DOSE, (OZEMPIC, 1 MG/DOSE,) 4 MG/3ML SOPN INJECT 1 MG UNDER THE SKIN ONCE A WEEK 15 mL 3    omeprazole (PRILOSEC) 10 MG delayed release capsule Take 1 capsule by mouth daily 90 capsule 3    Handicap Placard MISC by Does not apply route Expires 2025 1 each 0    glucose blood VI test strips (ASCENSIA AUTODISC VI;ONE TOUCH ULTRA TEST VI) strip 1 each by Other route daily Use with associated glucose meter. 100 strip 11     No current facility-administered medications for this visit.      Allergies   Allergen Reactions    Codeine Nausea Only       Health Maintenance   Topic Date Due    COVID-19 Vaccine (1) Never done    Diabetic foot exam  2022    Flu vaccine (1) Never done    DTaP/Tdap/Td vaccine (1 - Tdap) 2023 (Originally 1974)    Shingles vaccine (1 of 2) 2024 (Originally 2003)    A1C test (Diabetic or Prediabetic)  2024    Diabetic Alb to Cr ratio (uACR) test  2024    Lipids  2024    Annual Wellness Visit (AWV)  2024    Diabetic retinal exam  2024    Depression Screen  2024    GFR test (Diabetes, CKD 3-4, OR last GFR 15-59)  2024    Breast cancer screen  2025    Colorectal

## 2023-10-08 ENCOUNTER — APPOINTMENT (OUTPATIENT)
Dept: CT IMAGING | Age: 68
End: 2023-10-08
Payer: MEDICARE

## 2023-10-08 ENCOUNTER — HOSPITAL ENCOUNTER (EMERGENCY)
Age: 68
Discharge: HOME | End: 2023-10-08
Attending: SPECIALIST
Payer: MEDICARE

## 2023-10-08 VITALS
TEMPERATURE: 97.5 F | RESPIRATION RATE: 16 BRPM | DIASTOLIC BLOOD PRESSURE: 86 MMHG | OXYGEN SATURATION: 97 % | BODY MASS INDEX: 25.71 KG/M2 | HEART RATE: 68 BPM | WEIGHT: 160 LBS | HEIGHT: 66 IN | SYSTOLIC BLOOD PRESSURE: 171 MMHG

## 2023-10-08 DIAGNOSIS — R10.13 ABDOMINAL PAIN, EPIGASTRIC: Primary | ICD-10-CM

## 2023-10-08 DIAGNOSIS — N39.0 URINARY TRACT INFECTION WITHOUT HEMATURIA, SITE UNSPECIFIED: ICD-10-CM

## 2023-10-08 LAB
ALBUMIN SERPL-MCNC: 3.9 G/DL (ref 3.5–5.2)
ALBUMIN/GLOB SERPL: 1.3 {RATIO} (ref 1–2.5)
ALP SERPL-CCNC: 90 U/L (ref 35–104)
ALT SERPL-CCNC: 26 U/L (ref 5–33)
ANION GAP SERPL CALCULATED.3IONS-SCNC: 8 MMOL/L (ref 9–17)
AST SERPL-CCNC: 27 U/L
BACTERIA URNS QL MICRO: ABNORMAL
BASOPHILS # BLD: 0.04 K/UL (ref 0–0.2)
BASOPHILS NFR BLD: 1 % (ref 0–2)
BILIRUB SERPL-MCNC: 0.6 MG/DL (ref 0.3–1.2)
BILIRUB UR QL STRIP: NEGATIVE
BUN SERPL-MCNC: 18 MG/DL (ref 8–23)
CALCIUM SERPL-MCNC: 9.1 MG/DL (ref 8.6–10.4)
CHARACTER UR: ABNORMAL
CHLORIDE SERPL-SCNC: 102 MMOL/L (ref 98–107)
CLARITY UR: CLEAR
CO2 SERPL-SCNC: 26 MMOL/L (ref 20–31)
COLOR UR: YELLOW
CREAT SERPL-MCNC: 0.7 MG/DL (ref 0.5–0.9)
EOSINOPHIL # BLD: 0.2 K/UL (ref 0–0.4)
EOSINOPHILS RELATIVE PERCENT: 3 % (ref 1–4)
EPI CELLS #/AREA URNS HPF: ABNORMAL /HPF (ref 0–5)
ERYTHROCYTE [DISTWIDTH] IN BLOOD BY AUTOMATED COUNT: 12.7 % (ref 12.5–15.4)
GFR SERPL CREATININE-BSD FRML MDRD: >60 ML/MIN/1.73M2
GLUCOSE SERPL-MCNC: 144 MG/DL (ref 70–99)
GLUCOSE UR STRIP-MCNC: NEGATIVE MG/DL
HCT VFR BLD AUTO: 42.2 % (ref 36–46)
HGB BLD-MCNC: 14.2 G/DL (ref 12–16)
HGB UR QL STRIP.AUTO: NEGATIVE
KETONES UR STRIP-MCNC: NEGATIVE MG/DL
LACTATE BLDV-SCNC: 0.9 MMOL/L (ref 0.5–2.2)
LEUKOCYTE ESTERASE UR QL STRIP: ABNORMAL
LIPASE SERPL-CCNC: 39 U/L (ref 13–60)
LYMPHOCYTES NFR BLD: 2.2 K/UL (ref 1–4.8)
LYMPHOCYTES RELATIVE PERCENT: 32 % (ref 24–44)
MCH RBC QN AUTO: 29.8 PG (ref 26–34)
MCHC RBC AUTO-ENTMCNC: 33.6 G/DL (ref 31–37)
MCV RBC AUTO: 88.5 FL (ref 80–100)
MONOCYTES NFR BLD: 0.62 K/UL (ref 0.1–1.2)
MONOCYTES NFR BLD: 9 % (ref 2–11)
NEUTROPHILS NFR BLD: 55 % (ref 36–66)
NEUTS SEG NFR BLD: 3.85 K/UL (ref 1.8–7.7)
NITRITE UR QL STRIP: NEGATIVE
PH UR STRIP: 6 [PH] (ref 5–8)
PLATELET # BLD AUTO: 247 K/UL (ref 140–450)
PMV BLD AUTO: 9.8 FL (ref 8–14)
POTASSIUM SERPL-SCNC: 3.7 MMOL/L (ref 3.7–5.3)
PROT SERPL-MCNC: 6.9 G/DL (ref 6.4–8.3)
PROT UR STRIP-MCNC: NEGATIVE MG/DL
RBC # BLD AUTO: 4.77 M/UL (ref 4–5.2)
RBC #/AREA URNS HPF: ABNORMAL /HPF (ref 0–2)
SODIUM SERPL-SCNC: 136 MMOL/L (ref 135–144)
SP GR UR STRIP: 1.02 (ref 1–1.03)
TROPONIN I SERPL HS-MCNC: <6 NG/L (ref 0–14)
UROBILINOGEN UR STRIP-ACNC: NORMAL EU/DL (ref 0–1)
WBC #/AREA URNS HPF: ABNORMAL /HPF (ref 0–5)
WBC OTHER # BLD: 6.9 K/UL (ref 3.5–11)

## 2023-10-08 PROCEDURE — 74177 CT ABD & PELVIS W/CONTRAST: CPT

## 2023-10-08 PROCEDURE — 6370000000 HC RX 637 (ALT 250 FOR IP): Performed by: SPECIALIST

## 2023-10-08 PROCEDURE — 96374 THER/PROPH/DIAG INJ IV PUSH: CPT | Performed by: SPECIALIST

## 2023-10-08 PROCEDURE — C9113 INJ PANTOPRAZOLE SODIUM, VIA: HCPCS | Performed by: SPECIALIST

## 2023-10-08 PROCEDURE — A4216 STERILE WATER/SALINE, 10 ML: HCPCS | Performed by: SPECIALIST

## 2023-10-08 PROCEDURE — 6360000002 HC RX W HCPCS: Performed by: SPECIALIST

## 2023-10-08 PROCEDURE — 81001 URINALYSIS AUTO W/SCOPE: CPT

## 2023-10-08 PROCEDURE — 85025 COMPLETE CBC W/AUTO DIFF WBC: CPT

## 2023-10-08 PROCEDURE — 93005 ELECTROCARDIOGRAM TRACING: CPT

## 2023-10-08 PROCEDURE — 83605 ASSAY OF LACTIC ACID: CPT

## 2023-10-08 PROCEDURE — 87086 URINE CULTURE/COLONY COUNT: CPT

## 2023-10-08 PROCEDURE — 84484 ASSAY OF TROPONIN QUANT: CPT

## 2023-10-08 PROCEDURE — 83690 ASSAY OF LIPASE: CPT

## 2023-10-08 PROCEDURE — 80053 COMPREHEN METABOLIC PANEL: CPT

## 2023-10-08 PROCEDURE — 99285 EMERGENCY DEPT VISIT HI MDM: CPT | Performed by: SPECIALIST

## 2023-10-08 PROCEDURE — 6360000004 HC RX CONTRAST MEDICATION: Performed by: SPECIALIST

## 2023-10-08 PROCEDURE — 2580000003 HC RX 258: Performed by: SPECIALIST

## 2023-10-08 RX ORDER — SODIUM CHLORIDE 0.9 % (FLUSH) 0.9 %
3 SYRINGE (ML) INJECTION EVERY 8 HOURS
Status: DISCONTINUED | OUTPATIENT
Start: 2023-10-08 | End: 2023-10-08 | Stop reason: HOSPADM

## 2023-10-08 RX ORDER — PANTOPRAZOLE SODIUM 40 MG/1
40 TABLET, DELAYED RELEASE ORAL
Qty: 15 TABLET | Refills: 0 | Status: SHIPPED | OUTPATIENT
Start: 2023-10-08 | End: 2023-10-23

## 2023-10-08 RX ORDER — CEPHALEXIN 250 MG/1
500 CAPSULE ORAL ONCE
Status: COMPLETED | OUTPATIENT
Start: 2023-10-08 | End: 2023-10-08

## 2023-10-08 RX ORDER — SODIUM CHLORIDE 0.9 % (FLUSH) 0.9 %
10 SYRINGE (ML) INJECTION PRN
Status: COMPLETED | OUTPATIENT
Start: 2023-10-08 | End: 2023-10-08

## 2023-10-08 RX ORDER — CEPHALEXIN 500 MG/1
500 CAPSULE ORAL 2 TIMES DAILY
Qty: 14 CAPSULE | Refills: 0 | Status: SHIPPED | OUTPATIENT
Start: 2023-10-08 | End: 2023-10-15

## 2023-10-08 RX ORDER — 0.9 % SODIUM CHLORIDE 0.9 %
80 INTRAVENOUS SOLUTION INTRAVENOUS ONCE
Status: COMPLETED | OUTPATIENT
Start: 2023-10-08 | End: 2023-10-08

## 2023-10-08 RX ADMIN — IOPAMIDOL 75 ML: 755 INJECTION, SOLUTION INTRAVENOUS at 03:37

## 2023-10-08 RX ADMIN — SODIUM CHLORIDE, PRESERVATIVE FREE 10 ML: 5 INJECTION INTRAVENOUS at 03:38

## 2023-10-08 RX ADMIN — SODIUM CHLORIDE 40 MG: 9 INJECTION INTRAMUSCULAR; INTRAVENOUS; SUBCUTANEOUS at 04:07

## 2023-10-08 RX ADMIN — CEPHALEXIN 500 MG: 250 CAPSULE ORAL at 05:07

## 2023-10-08 RX ADMIN — SODIUM CHLORIDE 80 ML: 9 INJECTION, SOLUTION INTRAVENOUS at 03:39

## 2023-10-08 ASSESSMENT — ENCOUNTER SYMPTOMS
SORE THROAT: 0
ABDOMINAL DISTENTION: 0
COUGH: 0
BLOOD IN STOOL: 0
DIARRHEA: 0
ABDOMINAL PAIN: 1
SHORTNESS OF BREATH: 0
VOMITING: 0
NAUSEA: 0

## 2023-10-08 ASSESSMENT — PAIN SCALES - GENERAL: PAINLEVEL_OUTOF10: 10

## 2023-10-08 ASSESSMENT — PAIN - FUNCTIONAL ASSESSMENT: PAIN_FUNCTIONAL_ASSESSMENT: 0-10

## 2023-10-08 ASSESSMENT — PAIN DESCRIPTION - PAIN TYPE: TYPE: ACUTE PAIN

## 2023-10-08 ASSESSMENT — PAIN DESCRIPTION - LOCATION: LOCATION: ABDOMEN

## 2023-10-08 NOTE — DISCHARGE INSTRUCTIONS
Please understand that at this time there is no evidence for a more serious underlying process, but that early in the process of an illness or injury, an emergency department workup can be falsely reassuring. You should contact your family doctor within the next 48 hours for a follow up appointment    [address]!!!    From [facility] ([facility]) and [facility] Emergency Services    On behalf of the Emergency Department staff at [facility]), I would like to thank you for giving us the opportunity to address your health care needs and concerns. We hope that during your visit, our service was delivered in a professional and caring manner. Please keep [facility] ([facility]) in mind as we walk with you down the path to your own personal wellness. Please expect an automated text message or email from us so we can ask a few questions about your health and progress. Based on your answers, a clinician may call you back to offer help and instructions. Please understand that early in the process of an illness or injury, an emergency department workup can be falsely reassuring. If you notice any worsening, changing or persistent symptoms please call your family doctor or return to the ER immediately. Tell us how we did during your visit at [email or web address]   and let us know about your experience

## 2023-10-08 NOTE — ED PROVIDER NOTES
symptoms which are most concerning (e.g., bloody stool, fever, changing or worsening pain, vomiting) that necessitate immediate return. CONSULTS:  None    PROCEDURES:  None    FINAL IMPRESSION      1. Abdominal pain, epigastric    2. Urinary tract infection without hematuria, site unspecified          DISPOSITION/PLAN       PATIENT REFERRED TO:  DEISY Jennings - CNP  [address]  [address] [place] [address] [address]  [facility]  [phone]    Call in 2 days  For reevaluation of current symptoms    [facility] Emergency Department  Located within [facility]. [address] [address]  [phone]    If symptoms worsen      DISCHARGE MEDICATIONS:  Discharge Medication List as of 10/8/2023  4:57 AM        START taking these medications    Details   cephALEXin (KEFLEX) 500 MG capsule Take 1 capsule by mouth 2 times daily for 7 days, Disp-14 capsule, R-0Normal      pantoprazole (PROTONIX) 40 MG tablet Take 1 tablet by mouth every morning (before breakfast) for 15 days, Disp-15 tablet, R-0Normal             (Please note that portions of this note were completed with a voice recognition program.  Efforts were made to edit the dictations but occasionally words are mis-transcribed.)    Shelley Oden MD,, MD, F.A.C.E.P.   Attending Emergency Medicine Physician       Shelley Oden MD  10/08/23 1127

## 2023-10-09 LAB
EKG ATRIAL RATE: 65 BPM
EKG P AXIS: 39 DEGREES
EKG P-R INTERVAL: 148 MS
EKG Q-T INTERVAL: 420 MS
EKG QRS DURATION: 82 MS
EKG QTC CALCULATION (BAZETT): 436 MS
EKG R AXIS: 20 DEGREES
EKG T AXIS: 29 DEGREES
EKG VENTRICULAR RATE: 65 BPM
MICROORGANISM SPEC CULT: NORMAL
SPECIMEN DESCRIPTION: NORMAL

## 2024-01-16 ENCOUNTER — TELEPHONE (OUTPATIENT)
Dept: PRIMARY CARE CLINIC | Age: 69
End: 2024-01-16

## 2024-01-16 NOTE — TELEPHONE ENCOUNTER
The patient called asking if the office  has received a fax form [employer] for her diabetic supplies.    Writer advised that he would discuss with the patient's PCP's MA and contact the patient back once the writer knows.    The patient's PCP's MA states that she has not received any of the paperwork that the patient states was sent by [employer].    Call made to [employer] to request them to refax the orders to the office, unable to reach [employer] by phone.

## 2024-02-16 ENCOUNTER — OFFICE VISIT (OUTPATIENT)
Dept: PRIMARY CARE CLINIC | Age: 69
End: 2024-02-16
Payer: MEDICARE

## 2024-02-16 VITALS
BODY MASS INDEX: 27.16 KG/M2 | HEIGHT: 66 IN | SYSTOLIC BLOOD PRESSURE: 122 MMHG | HEART RATE: 81 BPM | OXYGEN SATURATION: 97 % | RESPIRATION RATE: 14 BRPM | WEIGHT: 169 LBS | DIASTOLIC BLOOD PRESSURE: 72 MMHG

## 2024-02-16 DIAGNOSIS — E11.9 TYPE 2 DIABETES MELLITUS WITHOUT COMPLICATION, WITHOUT LONG-TERM CURRENT USE OF INSULIN (HCC): ICD-10-CM

## 2024-02-16 DIAGNOSIS — Z00.00 ENCOUNTER FOR GENERAL ADULT MEDICAL EXAMINATION W/O ABNORMAL FINDINGS: Primary | ICD-10-CM

## 2024-02-16 PROBLEM — I48.91 ATRIAL FIBRILLATION (HCC): Status: RESOLVED | Noted: 2020-04-30 | Resolved: 2024-02-16

## 2024-02-16 PROBLEM — J44.9 CHRONIC OBSTRUCTIVE PULMONARY DISEASE (HCC): Status: RESOLVED | Noted: 2020-04-30 | Resolved: 2024-02-16

## 2024-02-16 LAB — HBA1C MFR BLD: 7.1 %

## 2024-02-16 PROCEDURE — 3051F HG A1C>EQUAL 7.0%<8.0%: CPT | Performed by: NURSE PRACTITIONER

## 2024-02-16 PROCEDURE — G8484 FLU IMMUNIZE NO ADMIN: HCPCS | Performed by: NURSE PRACTITIONER

## 2024-02-16 PROCEDURE — 1123F ACP DISCUSS/DSCN MKR DOCD: CPT | Performed by: NURSE PRACTITIONER

## 2024-02-16 PROCEDURE — 3078F DIAST BP <80 MM HG: CPT | Performed by: NURSE PRACTITIONER

## 2024-02-16 PROCEDURE — G0439 PPPS, SUBSEQ VISIT: HCPCS | Performed by: NURSE PRACTITIONER

## 2024-02-16 PROCEDURE — 3017F COLORECTAL CA SCREEN DOC REV: CPT | Performed by: NURSE PRACTITIONER

## 2024-02-16 PROCEDURE — 83036 HEMOGLOBIN GLYCOSYLATED A1C: CPT | Performed by: NURSE PRACTITIONER

## 2024-02-16 PROCEDURE — 3074F SYST BP LT 130 MM HG: CPT | Performed by: NURSE PRACTITIONER

## 2024-02-16 RX ORDER — TIRZEPATIDE 2.5 MG/.5ML
2.5 INJECTION, SOLUTION SUBCUTANEOUS WEEKLY
Qty: 6 ML | Refills: 3 | Status: SHIPPED | OUTPATIENT
Start: 2024-02-16

## 2024-02-16 RX ORDER — SEMAGLUTIDE 2.68 MG/ML
INJECTION, SOLUTION SUBCUTANEOUS
Qty: 9 ML | Refills: 3 | Status: SHIPPED | OUTPATIENT
Start: 2024-02-16 | End: 2024-02-16

## 2024-02-16 NOTE — PROGRESS NOTES
Medicare Annual Wellness Visit    Apurva Francis is here for Medicare AW    Assessment & Plan   Encounter for general adult medical examination w/o abnormal findings  Type 2 diabetes mellitus without complication, without long-term current use of insulin (HCC)  -     POCT glycosylated hemoglobin (Hb A1C)    Recommendations for Preventive Services Due: see orders and patient instructions/AVS.  Recommended screening schedule for the next 5-10 years is provided to the patient in written form: see Patient Instructions/AVS.     Return in about 6 months (around 8/16/2024) for Diabetes.     Subjective   The following acute and/or chronic problems were also addressed today:  See below    Patient's complete Health Risk Assessment and screening values have been reviewed and are found in Flowsheets. The following problems were reviewed today and where indicated follow up appointments were made and/or referrals ordered.    Positive Risk Factor Screenings with Interventions:                    Safety:  Do you have non-slip mats or non-slip surfaces or shower bars or grab bars in your shower or bathtub?: (!) No  Interventions:  Patient declined any further interventions or treatment                   Objective   Vitals:    02/16/24 0958   BP: 122/72   Pulse: 81   Resp: 14   SpO2: 97%   Weight: 76.7 kg (169 lb)   Height: 1.676 m (5' 6\")      Body mass index is 27.28 kg/m².      General Appearance: alert and oriented to person, place and time, well developed and well- nourished, in no acute distress  Skin: warm and dry, no rash or erythema  Head: normocephalic and atraumatic  Eyes: pupils equal, round, and reactive to light, extraocular eye movements intact, conjunctivae normal  ENT: tympanic membrane, external ear and ear canal normal bilaterally, nose without deformity, nasal mucosa and turbinates normal without polyps  Neck: supple and non-tender without mass, no thyromegaly or thyroid nodules, no cervical

## 2024-03-05 ENCOUNTER — TELEPHONE (OUTPATIENT)
Dept: PRIMARY CARE CLINIC | Age: 69
End: 2024-03-05

## 2024-03-05 NOTE — TELEPHONE ENCOUNTER
Patient called into the office. States that [employer] states that they have tried contacting us multiple times for additional information in regards to Mounjaro. Writer advised that I do not see anything scanned into , or any telephone encounters in regards to this medication. Writer spoke with providers MA and she states that they received a form today and it is in providers folder to sign. Patient states that she is out of medication and is now in limbo and she hopes that it gets resolved fast. Writer advised that I would note her record.    Last Visit Date: 2/16/2024   Next Visit Date: 8/16/2024

## 2024-03-08 DIAGNOSIS — E11.9 TYPE 2 DIABETES MELLITUS WITHOUT COMPLICATION, WITHOUT LONG-TERM CURRENT USE OF INSULIN (HCC): Primary | ICD-10-CM

## 2024-03-08 RX ORDER — TIRZEPATIDE 2.5 MG/.5ML
2.5 INJECTION, SOLUTION SUBCUTANEOUS WEEKLY
Qty: 6 ML | Refills: 3 | Status: SHIPPED | OUTPATIENT
Start: 2024-03-08

## 2024-03-08 NOTE — TELEPHONE ENCOUNTER
Clarification request form received from [employer], completed and faxed w/ LOV note    Patient notified and verbalized understanding

## 2024-03-08 NOTE — TELEPHONE ENCOUNTER
Last Visit Date: 2/16/2024   Next Visit Date: 8/16/2024    Patient called upset stating that [facility] cancelled her Mounjaro order due to the office not providing [employer] w/ the information needed. Writer checked chart and informed patient that form for [employer] was completed and faxed to them. Patient stated that it's our responsibility to know what needs to be sent to the pharmacy and that she doesn't understand why there is issues. Writer kindly informed patient that PCP sends the order in but it's the pharmacy's responsibility to let the office know what is needed.    Patient stated that she needs a new order sent in and wants a call back once the order and all information is figured out. Writer informed patient that order request will be sent to PCP and office will contact her once order is placed and then the patient will be advised to contact pharmacy. Patient verbalized understanding

## 2024-03-20 ENCOUNTER — TELEPHONE (OUTPATIENT)
Dept: PRIMARY CARE CLINIC | Age: 69
End: 2024-03-20

## 2024-03-20 RX ORDER — SCOLOPAMINE TRANSDERMAL SYSTEM 1 MG/1
1 PATCH, EXTENDED RELEASE TRANSDERMAL
Qty: 10 PATCH | Refills: 0 | Status: SHIPPED | OUTPATIENT
Start: 2024-03-20

## 2024-03-20 NOTE — TELEPHONE ENCOUNTER
Last Visit Date: 2/16/2024   Next Visit Date: 8/16/2024   Pt reports she is going on a cruise in a few weeks and would like an Rx for a patch to prevent sea-sickness. Pt reports [facility] in [place] is a good pharmacy. Thank you.

## 2024-03-29 DIAGNOSIS — E11.9 TYPE 2 DIABETES MELLITUS WITHOUT COMPLICATION, WITHOUT LONG-TERM CURRENT USE OF INSULIN (HCC): ICD-10-CM

## 2024-03-30 RX ORDER — TIRZEPATIDE 5 MG/.5ML
INJECTION, SOLUTION SUBCUTANEOUS
Qty: 2 ML | Refills: 11 | Status: SHIPPED | OUTPATIENT
Start: 2024-03-30

## 2024-06-23 DIAGNOSIS — E11.9 TYPE 2 DIABETES MELLITUS WITHOUT COMPLICATION, WITHOUT LONG-TERM CURRENT USE OF INSULIN (HCC): ICD-10-CM

## 2024-06-24 RX ORDER — TIRZEPATIDE 5 MG/.5ML
INJECTION, SOLUTION SUBCUTANEOUS
Qty: 2 ML | Refills: 11 | Status: SHIPPED | OUTPATIENT
Start: 2024-06-24 | End: 2024-06-24

## 2024-08-16 ENCOUNTER — OFFICE VISIT (OUTPATIENT)
Dept: PRIMARY CARE CLINIC | Age: 69
End: 2024-08-16
Payer: MEDICARE

## 2024-08-16 VITALS
WEIGHT: 166 LBS | DIASTOLIC BLOOD PRESSURE: 76 MMHG | SYSTOLIC BLOOD PRESSURE: 122 MMHG | OXYGEN SATURATION: 97 % | BODY MASS INDEX: 26.79 KG/M2 | HEART RATE: 74 BPM

## 2024-08-16 DIAGNOSIS — E11.9 TYPE 2 DIABETES MELLITUS WITHOUT COMPLICATION, WITHOUT LONG-TERM CURRENT USE OF INSULIN (HCC): Primary | ICD-10-CM

## 2024-08-16 DIAGNOSIS — E78.5 HYPERLIPIDEMIA, UNSPECIFIED HYPERLIPIDEMIA TYPE: ICD-10-CM

## 2024-08-16 DIAGNOSIS — Z12.31 VISIT FOR SCREENING MAMMOGRAM: ICD-10-CM

## 2024-08-16 DIAGNOSIS — R53.83 OTHER FATIGUE: ICD-10-CM

## 2024-08-16 DIAGNOSIS — E55.9 VITAMIN D DEFICIENCY: ICD-10-CM

## 2024-08-16 LAB — HBA1C MFR BLD: 6.6 %

## 2024-08-16 PROCEDURE — 2022F DILAT RTA XM EVC RTNOPTHY: CPT | Performed by: NURSE PRACTITIONER

## 2024-08-16 PROCEDURE — 3017F COLORECTAL CA SCREEN DOC REV: CPT | Performed by: NURSE PRACTITIONER

## 2024-08-16 PROCEDURE — G8427 DOCREV CUR MEDS BY ELIG CLIN: HCPCS | Performed by: NURSE PRACTITIONER

## 2024-08-16 PROCEDURE — 3078F DIAST BP <80 MM HG: CPT | Performed by: NURSE PRACTITIONER

## 2024-08-16 PROCEDURE — 99214 OFFICE O/P EST MOD 30 MIN: CPT | Performed by: NURSE PRACTITIONER

## 2024-08-16 PROCEDURE — 83036 HEMOGLOBIN GLYCOSYLATED A1C: CPT | Performed by: NURSE PRACTITIONER

## 2024-08-16 PROCEDURE — 1090F PRES/ABSN URINE INCON ASSESS: CPT | Performed by: NURSE PRACTITIONER

## 2024-08-16 PROCEDURE — 3044F HG A1C LEVEL LT 7.0%: CPT | Performed by: NURSE PRACTITIONER

## 2024-08-16 PROCEDURE — G8419 CALC BMI OUT NRM PARAM NOF/U: HCPCS | Performed by: NURSE PRACTITIONER

## 2024-08-16 PROCEDURE — G2211 COMPLEX E/M VISIT ADD ON: HCPCS | Performed by: NURSE PRACTITIONER

## 2024-08-16 PROCEDURE — 1036F TOBACCO NON-USER: CPT | Performed by: NURSE PRACTITIONER

## 2024-08-16 PROCEDURE — 1123F ACP DISCUSS/DSCN MKR DOCD: CPT | Performed by: NURSE PRACTITIONER

## 2024-08-16 PROCEDURE — 3074F SYST BP LT 130 MM HG: CPT | Performed by: NURSE PRACTITIONER

## 2024-08-16 PROCEDURE — G8399 PT W/DXA RESULTS DOCUMENT: HCPCS | Performed by: NURSE PRACTITIONER

## 2024-08-16 RX ORDER — TIRZEPATIDE 10 MG/.5ML
10 INJECTION, SOLUTION SUBCUTANEOUS WEEKLY
Qty: 6 ML | Refills: 1 | Status: SHIPPED | OUTPATIENT
Start: 2024-08-16

## 2024-08-16 ASSESSMENT — ENCOUNTER SYMPTOMS
BACK PAIN: 0
COUGH: 0
SHORTNESS OF BREATH: 0
ABDOMINAL PAIN: 0

## 2024-08-16 NOTE — PROGRESS NOTES
[facility]  [facility] PRIMARY CARE  [address] [place] [address] DR  [address]  [facility] [address]  Dept: [phone]  Dept Fax: [phone]    Apurva Francis is a 69 y.o. female who presentstoday for her medical conditions/complaints as noted below.  Apurva Francis is c/o of  Chief Complaint   Patient presents with    Diabetes    Weight Management     Taking Mounjaro, has only lost 3lbs in 6wks       HPI:     Presents for 6 month recheck   Bp well controlled  Has lost 3lb since LOV    Hga1c from 7.1 to 6.6  Tolerating mounjaro well  Would like to continue to increase dose  Denies any side effects with use of med    Willing to update annual labs  Declines mammogram    Denies any other problems/concerns        Hemoglobin A1C (%)   Date Value   08/16/2024 6.6   02/16/2024 7.1   01/27/2023 6.7             ( goal A1C is < 7)   No components found for: \"LABMICR\"  No components found for: \"LDLCHOLESTEROL\", \"LDLCALC\"    (goal LDL is <100)   AST (U/L)   Date Value   10/08/2023 27     ALT (U/L)   Date Value   10/08/2023 26     BUN (mg/dL)   Date Value   10/08/2023 18     BP Readings from Last 3 Encounters:   08/16/24 122/76   02/16/24 122/72   10/08/23 (!) 171/86          ([id number])    Past Medical History:   Diagnosis Date    Diabetes mellitus (HCC)     Fibromyalgia     GERD (gastroesophageal reflux disease)       Past Surgical History:   Procedure Laterality Date    CARPAL TUNNEL RELEASE      FINGER TRIGGER RELEASE Right 12/07/2018    ring    FINGER TRIGGER RELEASE N/A 12/7/2018    RIGHT RING FINGER TRIGGER RELEASE performed by Lincoln Hollis DO at [facility]    TUBAL LIGATION      ULNAR TUNNEL RELEASE Bilateral        Family History   Problem Relation Age of Onset    Diabetes Maternal Grandmother     Diabetes Maternal Grandfather           Social History     Tobacco Use    Smoking status: Former     Current packs/day: 0.00     Average packs/day: 1.5 packs/day for 30.0 years (45.0 ttl

## 2024-08-22 ENCOUNTER — TELEPHONE (OUTPATIENT)
Dept: PRIMARY CARE CLINIC | Age: 69
End: 2024-08-22

## 2024-08-28 NOTE — TELEPHONE ENCOUNTER
Call made to the patient to inform them of their PCP's instructions, writer left a voice message for the patient to contact the office back to further discuss the PCP's instructions.    
Pt informed  
Sent rx  
Spoke with the patient, she is willing to try Trulicity, she would like this to be sent to her mail order pharmacy.    Please advise.  
Suggest to change to trulicity, there is no stocking issue  This is for diabetes but does not help with weight loss as ozempic/mounjaro- which is why the meds are hard to find in stock  
The patient called upset stating that she is not able to get the Mounjaro 10mg filled [facility]  She states that Alisha advised her that she will have to contact the local pharmacies in the area to see who has the dose of the medication available for her.    Writer advised the patient that this is also what the office is advising, due to the office not knowing which pharmacies have which doses of the Moujaro are available.    The patient then became more upset stating \"She is going to have to put me on a different medication\" \"I'm not going to deal with this, this is ridiculous\".  The patient also stated that she won't do this every month for a \"damn\" medication.    Writer advised he would send a message to PCP and patient hung up call.    Please advise.  
stated

## 2024-09-27 ENCOUNTER — TELEPHONE (OUTPATIENT)
Dept: PRIMARY CARE CLINIC | Age: 69
End: 2024-09-27

## 2024-09-27 DIAGNOSIS — E11.9 TYPE 2 DIABETES MELLITUS WITHOUT COMPLICATION, WITHOUT LONG-TERM CURRENT USE OF INSULIN (HCC): Primary | ICD-10-CM

## 2024-09-27 RX ORDER — INSULIN GLARGINE 100 [IU]/ML
20 INJECTION, SOLUTION SUBCUTANEOUS NIGHTLY
Qty: 5 ADJUSTABLE DOSE PRE-FILLED PEN SYRINGE | Refills: 0 | Status: SHIPPED | OUTPATIENT
Start: 2024-09-27

## 2024-09-27 NOTE — TELEPHONE ENCOUNTER
Blood sugars have been 350 or higher most of the time-random.  Has a Gasper sensor.  Currently as writer is speaking with patient her reader is reading \"high\".    She is very concerned and ready to go to ER.  States she is wondering if there is really any medication (Trulicity) and is just water in pen since it is not doing anything at all.  Urinating every 20 minutes, in a bad mood, if she moves too fast she gets dizzy and nauseous.   Patient states \"she's getting pissed and asking why provider is not reviewing her chart and not raising the dose since here blood sugars are no going down.  States \"I'm ready to go look for another provider\"    Patient would like a call back.

## 2024-09-27 NOTE — TELEPHONE ENCOUNTER
Call made to the patient to inform her of her PCP's instructions.    The patient then became increasingly agitated with writer of the information that was given from PCP.      Patient states that \"This makes me unhappy\".    She states that her complaint was \"I'm trying to lose 20 freaking pounds and she knows that\" and \"When she changes the medication she doesn't change the dose and doesn't think.\"      Patient continued to say \"When they filled the Mounjaro, she increased the dose but then I had an issue that the pharmacy did not have the dose in stock.  And I have to go around and call all the pharmacies in the area to see where it is?\"  Patient then began to raise her voice at the writer stating \"I'm really tire of the whole God damn thing.\"    Writer further advised the patient of the second piece of information and patient loudly states \" We don't know if the Trulicity is the issue cause she hasn't increased the dose.\"    Patient angrily asked writer is she \"is the only person in [facility] that is having this issues\" with the medications.    Writer advised the patient that unfortunately the office does not know which pharmacies have which medications or what they have in stock.    Patient then became increasingly upset and started yelling at the writer.  Writer asked the patient to please stop yelling or he would end the call.    Patient then continued to increase her yelling and writer ended the call.

## 2024-09-27 NOTE — TELEPHONE ENCOUNTER
This is the first I have been made aware of the high blood sugar readings    I will send in rx for lantus to use nightly while we get this under control  Would she like to change back from trulicity to mounjaro or ozempic?    Please monitor FBS daily and once 2 hours after eating  Please be seen on Monday with Blood sugar readings- double book in am

## 2024-09-30 RX ORDER — DULAGLUTIDE 3 MG/.5ML
3 INJECTION, SOLUTION SUBCUTANEOUS WEEKLY
Qty: 9 ML | Refills: 2 | Status: SHIPPED | OUTPATIENT
Start: 2024-09-30

## 2024-09-30 NOTE — TELEPHONE ENCOUNTER
Pt notified and verbalized understanding but asked if she needs to continue her Insulin Glargine because her blood sugar is till reading high.      Please advise  Thanks

## 2024-09-30 NOTE — TELEPHONE ENCOUNTER
Can double dose of trulicity to 3mg weekly and use current supply  Keep us posted as to how her BS do with this increase    Should not use ozempic or mounjaro with trulicity

## 2024-09-30 NOTE — TELEPHONE ENCOUNTER
Writer called patient, she would like to increase Trulicity but states when she gets her meds she gets 3 mos at a time, so in her frig she has the low dose of Trulicity (Ozempic and Mounjaro)  Can she somehow double up on the Trulicity to use it up??  Please advise.

## 2024-10-01 NOTE — TELEPHONE ENCOUNTER
Patient notified of providers instructions and to keep track of blood sugars and call office with results.

## 2024-10-04 ENCOUNTER — PATIENT MESSAGE (OUTPATIENT)
Dept: PRIMARY CARE CLINIC | Age: 69
End: 2024-10-04

## 2024-10-16 ENCOUNTER — TELEPHONE (OUTPATIENT)
Dept: PRIMARY CARE CLINIC | Age: 69
End: 2024-10-16

## 2024-10-16 ENCOUNTER — OFFICE VISIT (OUTPATIENT)
Dept: PRIMARY CARE CLINIC | Age: 69
End: 2024-10-16
Payer: MEDICARE

## 2024-10-16 ENCOUNTER — HOSPITAL ENCOUNTER (OUTPATIENT)
Age: 69
Setting detail: SPECIMEN
Discharge: HOME | End: 2024-10-16

## 2024-10-16 VITALS
BODY MASS INDEX: 26.87 KG/M2 | HEIGHT: 66 IN | WEIGHT: 167.2 LBS | OXYGEN SATURATION: 98 % | SYSTOLIC BLOOD PRESSURE: 122 MMHG | DIASTOLIC BLOOD PRESSURE: 74 MMHG | HEART RATE: 67 BPM | RESPIRATION RATE: 15 BRPM

## 2024-10-16 DIAGNOSIS — R53.83 OTHER FATIGUE: ICD-10-CM

## 2024-10-16 DIAGNOSIS — E11.9 TYPE 2 DIABETES MELLITUS WITHOUT COMPLICATION, WITHOUT LONG-TERM CURRENT USE OF INSULIN (HCC): ICD-10-CM

## 2024-10-16 DIAGNOSIS — E78.5 HYPERLIPIDEMIA, UNSPECIFIED HYPERLIPIDEMIA TYPE: ICD-10-CM

## 2024-10-16 DIAGNOSIS — E55.9 VITAMIN D DEFICIENCY: ICD-10-CM

## 2024-10-16 LAB
25(OH)D3 SERPL-MCNC: 28.8 NG/ML (ref 30–100)
ALBUMIN SERPL-MCNC: 3.9 G/DL (ref 3.5–5.2)
ALBUMIN/GLOB SERPL: 1 {RATIO} (ref 1–2.5)
ALP SERPL-CCNC: 123 U/L (ref 35–104)
ALT SERPL-CCNC: 49 U/L (ref 10–35)
ANION GAP SERPL CALCULATED.3IONS-SCNC: 9 MMOL/L (ref 9–16)
AST SERPL-CCNC: 65 U/L (ref 10–35)
BILIRUB SERPL-MCNC: 0.7 MG/DL (ref 0–1.2)
BUN SERPL-MCNC: 12 MG/DL (ref 8–23)
CALCIUM SERPL-MCNC: 9 MG/DL (ref 8.6–10.4)
CHLORIDE SERPL-SCNC: 100 MMOL/L (ref 98–107)
CHOLEST SERPL-MCNC: 127 MG/DL (ref 0–199)
CHOLESTEROL/HDL RATIO: 4
CO2 SERPL-SCNC: 25 MMOL/L (ref 20–31)
CREAT SERPL-MCNC: 0.8 MG/DL (ref 0.5–0.9)
CREAT UR-MCNC: 49.5 MG/DL (ref 28–217)
ERYTHROCYTE [DISTWIDTH] IN BLOOD BY AUTOMATED COUNT: 12.4 % (ref 11.8–14.4)
GFR, ESTIMATED: 75 ML/MIN/1.73M2
GLUCOSE SERPL-MCNC: 452 MG/DL (ref 74–99)
HCT VFR BLD AUTO: 43.2 % (ref 36.3–47.1)
HDLC SERPL-MCNC: 32 MG/DL
HGB BLD-MCNC: 14.2 G/DL (ref 11.9–15.1)
LDLC SERPL CALC-MCNC: 69 MG/DL (ref 0–100)
MCH RBC QN AUTO: 30.1 PG (ref 25.2–33.5)
MCHC RBC AUTO-ENTMCNC: 32.9 G/DL (ref 28.4–34.8)
MCV RBC AUTO: 91.5 FL (ref 82.6–102.9)
MICROALBUMIN UR-MCNC: <12 MG/L (ref 0–20)
MICROALBUMIN/CREAT UR-RTO: NORMAL MCG/MG CREAT (ref 0–25)
NRBC BLD-RTO: 0 PER 100 WBC
PLATELET # BLD AUTO: 249 K/UL (ref 138–453)
PMV BLD AUTO: 10.4 FL (ref 8.1–13.5)
POTASSIUM SERPL-SCNC: 4.5 MMOL/L (ref 3.7–5.3)
PROT SERPL-MCNC: 6.8 G/DL (ref 6.6–8.7)
RBC # BLD AUTO: 4.72 M/UL (ref 3.95–5.11)
SODIUM SERPL-SCNC: 134 MMOL/L (ref 136–145)
TRIGL SERPL-MCNC: 129 MG/DL
TSH SERPL DL<=0.05 MIU/L-ACNC: 0.99 UIU/ML (ref 0.27–4.2)
VLDLC SERPL CALC-MCNC: 26 MG/DL
WBC OTHER # BLD: 8.8 K/UL (ref 3.5–11.3)

## 2024-10-16 PROCEDURE — 2022F DILAT RTA XM EVC RTNOPTHY: CPT | Performed by: NURSE PRACTITIONER

## 2024-10-16 PROCEDURE — G8419 CALC BMI OUT NRM PARAM NOF/U: HCPCS | Performed by: NURSE PRACTITIONER

## 2024-10-16 PROCEDURE — 3078F DIAST BP <80 MM HG: CPT | Performed by: NURSE PRACTITIONER

## 2024-10-16 PROCEDURE — 1036F TOBACCO NON-USER: CPT | Performed by: NURSE PRACTITIONER

## 2024-10-16 PROCEDURE — G8427 DOCREV CUR MEDS BY ELIG CLIN: HCPCS | Performed by: NURSE PRACTITIONER

## 2024-10-16 PROCEDURE — 99214 OFFICE O/P EST MOD 30 MIN: CPT | Performed by: NURSE PRACTITIONER

## 2024-10-16 PROCEDURE — 1090F PRES/ABSN URINE INCON ASSESS: CPT | Performed by: NURSE PRACTITIONER

## 2024-10-16 PROCEDURE — 3044F HG A1C LEVEL LT 7.0%: CPT | Performed by: NURSE PRACTITIONER

## 2024-10-16 PROCEDURE — 3074F SYST BP LT 130 MM HG: CPT | Performed by: NURSE PRACTITIONER

## 2024-10-16 PROCEDURE — G8484 FLU IMMUNIZE NO ADMIN: HCPCS | Performed by: NURSE PRACTITIONER

## 2024-10-16 PROCEDURE — 1123F ACP DISCUSS/DSCN MKR DOCD: CPT | Performed by: NURSE PRACTITIONER

## 2024-10-16 PROCEDURE — 3017F COLORECTAL CA SCREEN DOC REV: CPT | Performed by: NURSE PRACTITIONER

## 2024-10-16 PROCEDURE — G8399 PT W/DXA RESULTS DOCUMENT: HCPCS | Performed by: NURSE PRACTITIONER

## 2024-10-16 RX ORDER — ERGOCALCIFEROL 1.25 MG/1
50000 CAPSULE, LIQUID FILLED ORAL WEEKLY
Qty: 12 CAPSULE | Refills: 3 | Status: SHIPPED | OUTPATIENT
Start: 2024-10-16

## 2024-10-16 RX ORDER — INSULIN GLARGINE 100 [IU]/ML
30 INJECTION, SOLUTION SUBCUTANEOUS NIGHTLY
Qty: 5 ADJUSTABLE DOSE PRE-FILLED PEN SYRINGE | Refills: 0
Start: 2024-10-16

## 2024-10-16 SDOH — ECONOMIC STABILITY: FOOD INSECURITY: WITHIN THE PAST 12 MONTHS, THE FOOD YOU BOUGHT JUST DIDN'T LAST AND YOU DIDN'T HAVE MONEY TO GET MORE.: NEVER TRUE

## 2024-10-16 SDOH — ECONOMIC STABILITY: FOOD INSECURITY: WITHIN THE PAST 12 MONTHS, YOU WORRIED THAT YOUR FOOD WOULD RUN OUT BEFORE YOU GOT MONEY TO BUY MORE.: NEVER TRUE

## 2024-10-16 SDOH — ECONOMIC STABILITY: INCOME INSECURITY: HOW HARD IS IT FOR YOU TO PAY FOR THE VERY BASICS LIKE FOOD, HOUSING, MEDICAL CARE, AND HEATING?: NOT HARD AT ALL

## 2024-10-16 ASSESSMENT — PATIENT HEALTH QUESTIONNAIRE - PHQ9
SUM OF ALL RESPONSES TO PHQ QUESTIONS 1-9: 0
SUM OF ALL RESPONSES TO PHQ9 QUESTIONS 1 & 2: 0
1. LITTLE INTEREST OR PLEASURE IN DOING THINGS: NOT AT ALL
SUM OF ALL RESPONSES TO PHQ QUESTIONS 1-9: 0
SUM OF ALL RESPONSES TO PHQ QUESTIONS 1-9: 0
2. FEELING DOWN, DEPRESSED OR HOPELESS: NOT AT ALL
SUM OF ALL RESPONSES TO PHQ QUESTIONS 1-9: 0

## 2024-10-16 ASSESSMENT — ENCOUNTER SYMPTOMS
COUGH: 0
SHORTNESS OF BREATH: 0
ABDOMINAL PAIN: 0
BACK PAIN: 0

## 2024-10-16 NOTE — PROGRESS NOTES
[facility]  [facility] PRIMARY CARE  [address] [place] [address] [address]  [address]  [facility] [address]  Dept: [phone]  Dept Fax: [phone]    Apurva Francis is a 69 y.o. female who presentstoday for her medical conditions/complaints as noted below.  Apurva Francis is c/o of  Chief Complaint   Patient presents with    Hyperglycemia     295    Diabetes     Average has been 300 x 3 months     Dysmenorrhea     In legs since sugar has been running high     Discuss Medications           HPI:     Presents for recheck on DM  BP well controlled  Weight is stable   Has joined a gym with   Concern regarding BMI    LOV 8/16/24, Hga1c was 6.6  Using mounjaro- has a hard time finding rx at pharmacy  Sent trulicity  Able to find in stock but since this change BS have been in 300s  CGM reading high at appt  Emailed list of glucose trends  Suggest to return to use of mounjaro- she is willing and has available at home  Will email weekly BS readings    Also concerned regarding weight  Reviewed this is separate from controlling BS  Suggest to limit carbs under 80g daily  Will address in greater detail when BS are controlled    Denies any other problems/concerns      Hemoglobin A1C (%)   Date Value   08/16/2024 6.6   02/16/2024 7.1   01/27/2023 6.7             ( goal A1C is < 7)   No components found for: \"LABMICR\"  No components found for: \"LDLCHOLESTEROL\", \"LDLCALC\"    (goal LDL is <100)   AST (U/L)   Date Value   10/08/2023 27     ALT (U/L)   Date Value   10/08/2023 26     BUN (mg/dL)   Date Value   10/08/2023 18     BP Readings from Last 3 Encounters:   10/16/24 122/74   08/16/24 122/76   02/16/24 122/72          ([id number])    Past Medical History:   Diagnosis Date    Diabetes mellitus (HCC)     Fibromyalgia     GERD (gastroesophageal reflux disease)       Past Surgical History:   Procedure Laterality Date    CARPAL TUNNEL RELEASE      FINGER TRIGGER RELEASE Right 12/07/2018    ring    FINGER TRIGGER

## 2024-11-02 DIAGNOSIS — E11.9 TYPE 2 DIABETES MELLITUS WITHOUT COMPLICATION, WITHOUT LONG-TERM CURRENT USE OF INSULIN (HCC): ICD-10-CM

## 2024-11-02 RX ORDER — INSULIN GLARGINE 100 [IU]/ML
30 INJECTION, SOLUTION SUBCUTANEOUS NIGHTLY
Qty: 15 ADJUSTABLE DOSE PRE-FILLED PEN SYRINGE | Refills: 3 | Status: SHIPPED | OUTPATIENT
Start: 2024-11-02

## 2024-11-03 RX ORDER — TIRZEPATIDE 10 MG/.5ML
10 INJECTION, SOLUTION SUBCUTANEOUS WEEKLY
Qty: 6 ML | Refills: 0 | Status: SHIPPED | OUTPATIENT
Start: 2024-11-03

## 2024-11-19 ENCOUNTER — TELEPHONE (OUTPATIENT)
Dept: PHARMACY | Facility: CLINIC | Age: 69
End: 2024-11-19

## 2024-11-20 ENCOUNTER — OFFICE VISIT (OUTPATIENT)
Dept: PRIMARY CARE CLINIC | Age: 69
End: 2024-11-20

## 2024-11-20 VITALS
OXYGEN SATURATION: 97 % | BODY MASS INDEX: 26.45 KG/M2 | HEART RATE: 71 BPM | WEIGHT: 164.6 LBS | DIASTOLIC BLOOD PRESSURE: 72 MMHG | RESPIRATION RATE: 16 BRPM | HEIGHT: 66 IN | SYSTOLIC BLOOD PRESSURE: 118 MMHG

## 2024-11-20 DIAGNOSIS — E78.5 HYPERLIPIDEMIA, UNSPECIFIED HYPERLIPIDEMIA TYPE: ICD-10-CM

## 2024-11-20 DIAGNOSIS — R22.31 AXILLARY MASS, RIGHT: ICD-10-CM

## 2024-11-20 DIAGNOSIS — E55.9 VITAMIN D DEFICIENCY: ICD-10-CM

## 2024-11-20 DIAGNOSIS — E11.9 TYPE 2 DIABETES MELLITUS WITHOUT COMPLICATION, WITHOUT LONG-TERM CURRENT USE OF INSULIN (HCC): Primary | ICD-10-CM

## 2024-11-20 SDOH — ECONOMIC STABILITY: FOOD INSECURITY: WITHIN THE PAST 12 MONTHS, THE FOOD YOU BOUGHT JUST DIDN'T LAST AND YOU DIDN'T HAVE MONEY TO GET MORE.: NEVER TRUE

## 2024-11-20 SDOH — ECONOMIC STABILITY: FOOD INSECURITY: WITHIN THE PAST 12 MONTHS, YOU WORRIED THAT YOUR FOOD WOULD RUN OUT BEFORE YOU GOT MONEY TO BUY MORE.: NEVER TRUE

## 2024-11-20 SDOH — ECONOMIC STABILITY: INCOME INSECURITY: HOW HARD IS IT FOR YOU TO PAY FOR THE VERY BASICS LIKE FOOD, HOUSING, MEDICAL CARE, AND HEATING?: NOT HARD AT ALL

## 2024-11-20 ASSESSMENT — ENCOUNTER SYMPTOMS
SHORTNESS OF BREATH: 0
COUGH: 0
BACK PAIN: 0
ABDOMINAL PAIN: 0

## 2024-11-20 ASSESSMENT — PATIENT HEALTH QUESTIONNAIRE - PHQ9
SUM OF ALL RESPONSES TO PHQ9 QUESTIONS 1 & 2: 0
SUM OF ALL RESPONSES TO PHQ QUESTIONS 1-9: 0
SUM OF ALL RESPONSES TO PHQ QUESTIONS 1-9: 0
1. LITTLE INTEREST OR PLEASURE IN DOING THINGS: NOT AT ALL
SUM OF ALL RESPONSES TO PHQ QUESTIONS 1-9: 0
2. FEELING DOWN, DEPRESSED OR HOPELESS: NOT AT ALL
SUM OF ALL RESPONSES TO PHQ QUESTIONS 1-9: 0

## 2024-11-20 NOTE — TELEPHONE ENCOUNTER
STACY DUMAS, APRN - CNP,      Patient's insurance has identified statin use in persons with diabetes (SUPD) care gap:   68yo female with Type 2 DM, LDL 69, low HDL - Would patient benefit from statin therapy?   Consider statin initiation, if appropriate.     Last visit: 10/16/2024, Next visit: 11/20/2024     See encounter note(s) below for complete details. Please let me know if you have any questions.      Thank you,  Beatrice Devlin, PharmD, BCACP, BCGP  Population Health Pharmacist   [facility] Clinical Pharmacy  Department, toll free: [phone], option 1    =======================================================    POPULATION [facility] CLINICAL PHARMACY: STATIN THERAPY REVIEW  Identified statin use in persons with diabetes (SUPD) care gap per [employer]. Records dated: 11/19/24    Allergies   Allergen Reactions    Codeine Nausea Only     STATIN GAP IDENTIFIED    Per Reconcile Dispense History as of 11/19/2024: No statin fill history    Lipid Panel, LFTs   Component Value Date/Time    CHOL 127 10/16/2024 10:42 AM    TRIG 129 10/16/2024 10:42 AM    HDL 32 10/16/2024 10:42 AM    LDL 69 10/16/2024 10:42 AM    ALT 49 10/16/2024 10:42 AM    AST 65 10/16/2024 10:42 AM      The ASCVD Risk score (Kate GONCALVES, et al., 2019) failed to calculate for the following reasons:    The valid total cholesterol range is 130 to 320 mg/dL     BP Readings from Last 3 Encounters:   10/16/24 122/74   08/16/24 122/76   02/16/24 122/72     Hemoglobin A1c   Component Value Date    LABA1C 6.6 08/16/2024    LABA1C 7.1 02/16/2024    LABA1C 6.7 01/27/2023     Renal Function   Component Value Date    LABGLOM 75 10/16/2024    CREATININE 0.8 10/16/2024   Estimated Creatinine Clearance: 67 mL/min (based on SCr of 0.8 mg/dL).     Latest Reference Range & Units 10/16/24 10:42   Creatinine, Ur 28.0 - 217.0 mg/dL 49.5   Microalb/Creat Ratio POC 0.0 - 25.0 mcg/mg creat Can not be calculated   Microalb, Ur 0 - 20 mg/L <12     Patient has the

## 2024-11-20 NOTE — PROGRESS NOTES
[facility]  [facility] PRIMARY CARE  [address] [place] [address] [address]  [address]  [facility] [address]  Dept: [phone]  Dept Fax: [phone]    Apurva Francis is a 69 y.o. female who presentstoday for her medical conditions/complaints as noted below.  Apurva Francis is c/o of  Chief Complaint   Patient presents with    Diabetes    Other     Has painful lump under right armpit x 2 weeks, has not increased in size         HPI:     Presents for one month recheck on chronic conditions  BP well controlled  Has lost 3lb since LOV  Continues to work on diet/exercise    Blood sugars have significantly improved over the last month   Sending weekly updates to email from [facility]  80% of readings are now in green zone  Using mounjaro 10mg weekly, tolerating well  Denies any side effects with use of med    C/o right axiallary mass x 2 weeks  Was using a lymph press on face and neck when swelling occurred  Approx tennis ball swelling to right axilla, painful to touch  Will update US  Last mammogram done 6/2023    Denies any other problems/concerns        Hemoglobin A1C (%)   Date Value   08/16/2024 6.6   02/16/2024 7.1   01/27/2023 6.7             ( goal A1C is < 7)   No components found for: \"LABMICR\"  No components found for: \"LDLCHOLESTEROL\", \"LDLCALC\"    (goal LDL is <100)   AST (U/L)   Date Value   10/16/2024 65 (H)     ALT (U/L)   Date Value   10/16/2024 49 (H)     BUN (mg/dL)   Date Value   10/16/2024 12     BP Readings from Last 3 Encounters:   11/20/24 118/72   10/16/24 122/74   08/16/24 122/76          ([id number])    Past Medical History:   Diagnosis Date    Diabetes mellitus (HCC)     Fibromyalgia     GERD (gastroesophageal reflux disease)       Past Surgical History:   Procedure Laterality Date    CARPAL TUNNEL RELEASE      FINGER TRIGGER RELEASE Right 12/07/2018    ring    FINGER TRIGGER RELEASE N/A 12/7/2018    RIGHT RING FINGER TRIGGER RELEASE performed by Lincoln Hollis,  at [facility] OR

## 2024-11-21 ENCOUNTER — HOSPITAL ENCOUNTER (OUTPATIENT)
Dept: ULTRASOUND IMAGING | Age: 69
Discharge: HOME | End: 2024-11-23
Payer: MEDICARE

## 2024-11-21 DIAGNOSIS — R22.31 AXILLARY MASS, RIGHT: ICD-10-CM

## 2024-11-21 PROCEDURE — 76882 US LMTD JT/FCL EVL NVASC XTR: CPT

## 2024-11-21 RX ORDER — DOXYCYCLINE 100 MG/1
100 CAPSULE ORAL 2 TIMES DAILY
Qty: 20 CAPSULE | Refills: 0 | Status: SHIPPED | OUTPATIENT
Start: 2024-11-21 | End: 2024-12-01

## 2024-11-21 RX ORDER — PREDNISONE 20 MG/1
20 TABLET ORAL 2 TIMES DAILY
Qty: 10 TABLET | Refills: 0 | Status: SHIPPED | OUTPATIENT
Start: 2024-11-21 | End: 2024-11-26

## 2024-12-04 RX ORDER — DOXYCYCLINE 100 MG/1
100 CAPSULE ORAL 2 TIMES DAILY
Qty: 20 CAPSULE | Refills: 0 | Status: SHIPPED | OUTPATIENT
Start: 2024-12-04 | End: 2024-12-14

## 2024-12-12 ENCOUNTER — PATIENT MESSAGE (OUTPATIENT)
Dept: PRIMARY CARE CLINIC | Age: 69
End: 2024-12-12

## 2024-12-12 DIAGNOSIS — R22.31 AXILLARY MASS, RIGHT: Primary | ICD-10-CM

## 2024-12-12 DIAGNOSIS — R59.0 LYMPHADENOPATHY, AXILLARY: ICD-10-CM

## 2024-12-12 DIAGNOSIS — R59.9 ENLARGED LYMPH NODES, UNSPECIFIED: ICD-10-CM

## 2024-12-12 DIAGNOSIS — Z12.31 VISIT FOR SCREENING MAMMOGRAM: Primary | ICD-10-CM

## 2024-12-16 ENCOUNTER — TELEPHONE (OUTPATIENT)
Dept: PRIMARY CARE CLINIC | Age: 69
End: 2024-12-16

## 2024-12-16 DIAGNOSIS — R22.31 AXILLARY MASS, RIGHT: Primary | ICD-10-CM

## 2024-12-16 DIAGNOSIS — N63.31 UNSPECIFIED LUMP IN AXILLARY TAIL OF THE RIGHT BREAST: ICD-10-CM

## 2024-12-16 DIAGNOSIS — R59.9 ENLARGED LYMPH NODES, UNSPECIFIED: ICD-10-CM

## 2024-12-16 NOTE — TELEPHONE ENCOUNTER
[facility] scheduling called stating that the US Breast Right is throwing an ABN when scheduling as not being covered by Medicare with the diagnosis code that is placed with it. Unsure if additional diagnosis code that is on mammogram needs to be added as well to have Medicare cover the order.    Also per the radiology recommendations, it is advised to order a bilateral diagnostic mammogram of both right and left for the new onset of the patient's symptoms.    Scheduling advised if Pcp has any questions to reach out to the [facility] or [facility] for additional help for orders.    Please advise

## 2024-12-17 NOTE — TELEPHONE ENCOUNTER
Writer spoke with [facility] Scheduling, there is no STAT Mammogram order, patient currently has the soonest appointment scheduled between [facility] and [facility] and is on a wait list.

## 2025-01-06 RX ORDER — TIRZEPATIDE 10 MG/.5ML
INJECTION, SOLUTION SUBCUTANEOUS
Qty: 6 ML | Refills: 3 | Status: SHIPPED | OUTPATIENT
Start: 2025-01-06

## 2025-01-08 ENCOUNTER — HOSPITAL ENCOUNTER (OUTPATIENT)
Dept: MAMMOGRAPHY | Age: 70
Discharge: HOME | End: 2025-01-10
Payer: MEDICARE

## 2025-01-08 ENCOUNTER — HOSPITAL ENCOUNTER (OUTPATIENT)
Dept: ULTRASOUND IMAGING | Age: 70
Discharge: HOME | End: 2025-01-10
Payer: MEDICARE

## 2025-01-08 VITALS — HEIGHT: 66 IN | WEIGHT: 155 LBS | BODY MASS INDEX: 24.91 KG/M2

## 2025-01-08 DIAGNOSIS — R59.9 ENLARGED LYMPH NODES, UNSPECIFIED: ICD-10-CM

## 2025-01-08 DIAGNOSIS — R22.31 AXILLARY MASS, RIGHT: ICD-10-CM

## 2025-01-08 DIAGNOSIS — N63.31 UNSPECIFIED LUMP IN AXILLARY TAIL OF THE RIGHT BREAST: ICD-10-CM

## 2025-01-08 DIAGNOSIS — R92.8 ABNORMAL MAMMOGRAM: Primary | ICD-10-CM

## 2025-01-08 PROCEDURE — G0279 TOMOSYNTHESIS, MAMMO: HCPCS

## 2025-01-08 PROCEDURE — 76642 ULTRASOUND BREAST LIMITED: CPT

## 2025-01-10 ENCOUNTER — INITIAL CONSULT (OUTPATIENT)
Dept: ONCOLOGY | Age: 70
End: 2025-01-10

## 2025-01-10 ENCOUNTER — TELEPHONE (OUTPATIENT)
Dept: ONCOLOGY | Age: 70
End: 2025-01-10

## 2025-01-10 VITALS
OXYGEN SATURATION: 95 % | RESPIRATION RATE: 14 BRPM | WEIGHT: 156.8 LBS | HEIGHT: 66 IN | BODY MASS INDEX: 25.2 KG/M2 | DIASTOLIC BLOOD PRESSURE: 72 MMHG | SYSTOLIC BLOOD PRESSURE: 121 MMHG | TEMPERATURE: 97.4 F | HEART RATE: 115 BPM

## 2025-01-10 DIAGNOSIS — R59.0 LYMPHADENOPATHY, AXILLARY: Primary | ICD-10-CM

## 2025-01-10 NOTE — PROGRESS NOTES
status - good mood, alert and oriented  Eyes - pupils equal and reactive, extraocular eye movements intact  Ears - bilateral TM's and external ear canals normal  Nose - normal and patent, no erythema, discharge or polyps  Mouth - mucous membranes moist, pharynx normal without lesions  Neck - supple, no significant adenopathy  Lymphatics -palpable multiple right axillary lymph nodes with the largest measuring about 5 x 5 cm in the right breast tail area.  Chest - clear to auscultation, no wheezes, rales or rhonchi, symmetric air entry  Heart - normal rate, regular rhythm, normal S1, S2, no murmurs, rubs, clicks or gallops  Abdomen - soft, nontender, nondistended, no masses or organomegaly  Neurological - alert, oriented, normal speech, no focal findings or movement disorder noted  Musculoskeletal - no joint tenderness, deformity or swelling  Extremities - peripheral pulses normal, no pedal edema, no clubbing or cyanosis  Skin - normal coloration and turgor, no rashes, no suspicious skin lesions noted     Review of Diagnostic data:   Lab Results   Component Value Date    WBC 8.8 10/16/2024    HGB 14.2 10/16/2024    HCT 43.2 10/16/2024    MCV 91.5 10/16/2024     10/16/2024       Chemistry        Component Value Date/Time     (L) 10/16/2024 1042    K 4.5 10/16/2024 1042     10/16/2024 1042    CO2 25 10/16/2024 1042    BUN 12 10/16/2024 1042    CREATININE 0.8 10/16/2024 1042        Component Value Date/Time    CALCIUM 9.0 10/16/2024 1042    ALKPHOS 123 (H) 10/16/2024 1042    AST 65 (H) 10/16/2024 1042    ALT 49 (H) 10/16/2024 1042    BILITOT 0.7 10/16/2024 1042          [unfilled]      IMPRESSION:   Extensive right axillary lymphadenopathy.  Rule out lymphoma.    PLAN: Records, labs and images were reviewed and discussed with the patient. I explained to the patient the nature of this problem with axillary lymphadenopathy and possible underlying cause and management plan.  Mammogram and ultrasound

## 2025-01-10 NOTE — TELEPHONE ENCOUNTER
Instructions   from Dr. Finesse Baeza MD    Will have rt axillary LN biopsy on Thursday  Nurse navigator  Further recommendations based on results.     Faxed over pt information to Susan, at [phone]  Rv not scheduled, as further recommendations based on results; gave pt a copy of  eMlissa office phone number.

## 2025-01-10 NOTE — PATIENT INSTRUCTIONS
Will have rt axillary LN biopsy on Thursday  Nurse navigator  Further recommendations based on results.

## 2025-01-14 ENCOUNTER — TELEPHONE (OUTPATIENT)
Dept: ONCOLOGY | Age: 70
End: 2025-01-14

## 2025-01-14 NOTE — TELEPHONE ENCOUNTER
Name: Apurva Francis  : 1955  MRN: [id number]    Oncology Navigation- Initial Note: first attempt     Intake-  Contact Type: Telephone    Notes: Referral for ONN received. Attempted to contact pt, no answer. VM left introducing self/role. Reminded pt of upcoming biopsy details. Encouraged pt to call navigator as needed with any questions, concerns or barriers. Provided pt with navigator's contact information.       Electronically signed by Susan Tellez RN on 2025 at 1:47 PM

## 2025-01-16 ENCOUNTER — HOSPITAL ENCOUNTER (OUTPATIENT)
Dept: MAMMOGRAPHY | Age: 70
Discharge: HOME | End: 2025-01-18
Payer: MEDICARE

## 2025-01-16 ENCOUNTER — HOSPITAL ENCOUNTER (OUTPATIENT)
Dept: ULTRASOUND IMAGING | Age: 70
Discharge: HOME | End: 2025-01-18
Payer: MEDICARE

## 2025-01-16 DIAGNOSIS — R92.8 ABNORMAL MAMMOGRAM: ICD-10-CM

## 2025-01-16 DIAGNOSIS — Z98.890 STATUS POST BREAST BIOPSY: ICD-10-CM

## 2025-01-16 PROCEDURE — 2500000003 HC RX 250 WO HCPCS

## 2025-01-16 PROCEDURE — 76942 ECHO GUIDE FOR BIOPSY: CPT

## 2025-01-16 PROCEDURE — 6360000002 HC RX W HCPCS

## 2025-01-16 PROCEDURE — 77065 DX MAMMO INCL CAD UNI: CPT

## 2025-01-20 ENCOUNTER — TELEPHONE (OUTPATIENT)
Dept: ONCOLOGY | Age: 70
End: 2025-01-20

## 2025-01-20 NOTE — TELEPHONE ENCOUNTER
Name: Apurva Francis  : 1955  MRN: [id number]    Oncology Navigation- Initial Note: second attempt     Intake-  Contact Type: Telephone    Continuum of Care:  diagnosis pending     Notes: Writer spoke with pt, introduced self and navigator role. Pt was referred to navigation by Dr Cr. Pt complete breast biopsy, results pending. .     Barriers of care were reviewed with pt. Currently pt reports no barriers to care. Discussed that if future barriers arise, navigator can help with resources to overcome these.     Pt was given navigator's contact information and encouraged to contact writer as needed.     Electronically signed by Susan Tellez RN on 2025 at 8:14 AM

## 2025-01-21 LAB — SURGICAL PATHOLOGY REPORT: NORMAL

## 2025-01-22 ENCOUNTER — TELEPHONE (OUTPATIENT)
Dept: ONCOLOGY | Age: 70
End: 2025-01-22

## 2025-01-22 NOTE — TELEPHONE ENCOUNTER
Name: Apurva Francis  : 1955  MRN: [id number]    Oncology Navigation Follow-Up Note    Contact Type:  Telephone    Notes: Pt's PCP updated pt on her biopsy results. Writer reviewed results with pt. Discussed that she will need a f/u with Dr Cr to discuss next steps. Writer coordinated appt with [facility] schedulers for  at 1015. Pt did not voice barriers to this. Writer sent Dr Cr message updating him on biopsy results.       Electronically signed by Susan Tellez RN on 2025 at 10:12 AM

## 2025-01-23 LAB — MOLECULAR CYTOGENIC FISH: NORMAL

## 2025-01-24 ENCOUNTER — OFFICE VISIT (OUTPATIENT)
Dept: ONCOLOGY | Age: 70
End: 2025-01-24
Payer: MEDICARE

## 2025-01-24 ENCOUNTER — TELEPHONE (OUTPATIENT)
Dept: ONCOLOGY | Age: 70
End: 2025-01-24

## 2025-01-24 VITALS
HEART RATE: 92 BPM | BODY MASS INDEX: 24.04 KG/M2 | DIASTOLIC BLOOD PRESSURE: 70 MMHG | TEMPERATURE: 97 F | SYSTOLIC BLOOD PRESSURE: 126 MMHG | RESPIRATION RATE: 14 BRPM | HEIGHT: 66 IN | WEIGHT: 149.6 LBS

## 2025-01-24 DIAGNOSIS — C83.34 DIFFUSE LARGE B-CELL LYMPHOMA OF LYMPH NODES OF AXILLA (HCC): Primary | ICD-10-CM

## 2025-01-24 DIAGNOSIS — Z51.11 ENCOUNTER FOR ANTINEOPLASTIC CHEMOTHERAPY: ICD-10-CM

## 2025-01-24 DIAGNOSIS — R59.0 LYMPHADENOPATHY, AXILLARY: ICD-10-CM

## 2025-01-24 PROCEDURE — 1090F PRES/ABSN URINE INCON ASSESS: CPT | Performed by: INTERNAL MEDICINE

## 2025-01-24 PROCEDURE — 99211 OFF/OP EST MAY X REQ PHY/QHP: CPT | Performed by: INTERNAL MEDICINE

## 2025-01-24 PROCEDURE — 1036F TOBACCO NON-USER: CPT | Performed by: INTERNAL MEDICINE

## 2025-01-24 PROCEDURE — G8399 PT W/DXA RESULTS DOCUMENT: HCPCS | Performed by: INTERNAL MEDICINE

## 2025-01-24 PROCEDURE — G8427 DOCREV CUR MEDS BY ELIG CLIN: HCPCS | Performed by: INTERNAL MEDICINE

## 2025-01-24 PROCEDURE — 1123F ACP DISCUSS/DSCN MKR DOCD: CPT | Performed by: INTERNAL MEDICINE

## 2025-01-24 PROCEDURE — G8420 CALC BMI NORM PARAMETERS: HCPCS | Performed by: INTERNAL MEDICINE

## 2025-01-24 PROCEDURE — 3074F SYST BP LT 130 MM HG: CPT | Performed by: INTERNAL MEDICINE

## 2025-01-24 PROCEDURE — 3078F DIAST BP <80 MM HG: CPT | Performed by: INTERNAL MEDICINE

## 2025-01-24 PROCEDURE — 3017F COLORECTAL CA SCREEN DOC REV: CPT | Performed by: INTERNAL MEDICINE

## 2025-01-24 PROCEDURE — 1125F AMNT PAIN NOTED PAIN PRSNT: CPT | Performed by: INTERNAL MEDICINE

## 2025-01-24 PROCEDURE — 99215 OFFICE O/P EST HI 40 MIN: CPT | Performed by: INTERNAL MEDICINE

## 2025-01-24 NOTE — TELEPHONE ENCOUNTER
Name: Apurva Francis  : 1955  MRN: [id number]    Oncology Navigation Follow-Up Note    Contact Type:  Medical Oncology    Notes: Met with pt prior to her MO consult. We discussed the recent loss of her  in December. Pt voiced that she is having a hard time believing in anything at this point due to recent life events. Active listening and support was provided to pt. We discussed local resources and writer encouraged pt to utilize these. Pt voiced that she would like to take a holistic approach vs chemotherapy. Encouraged pt to discuss with Dr Perez. Explained that Dr Perez will suggest chemo based on scientific data and multiple studies but pt ultimately has the decision on which therapy she would like to proceed with.      Encoruaged pt to reach out to navigator as needed.       Electronically signed by Susan Tellez RN on 2025 at 10:55 AM

## 2025-01-24 NOTE — TELEPHONE ENCOUNTER
Name: Apurva Francis  : 1955  MRN: [id number]    Oncology Navigation Follow-Up Note    Contact Type:  Telephone    Notes: Writer reviewed chart and noted pt was scheduled for ECHO/PET in [place]. Pt lives in [place]. Called pt to see if she requested [place] location. Pt stated no, she would prefer [place]. Writer rescheduled for PET and Echo at [place] for  with arrival of 0845. Pt updated on new appt details.       Electronically signed by Susan Tellez RN on 2025 at 1:09 PM

## 2025-01-24 NOTE — TELEPHONE ENCOUNTER
Instructions   from Dr. Finesse Baeza MD    PET/CT scan soon  Echo soon  IR for mediport  Start R-CHOP after authorization   RV at start of chemo    CT scheduled on 2/6/2025 @6:55 at [facility]  Echo scheduled on 1/30/2025 @1 at [facility]  Called IR to call and schedule pts mediport placement; IR said they would call and schedule appt with pt  RV not scheduled due to waiting for chemo authorization; gave pt their orders and AVS

## 2025-01-24 NOTE — PATIENT INSTRUCTIONS
PET/CT scan soon  Echo soon  IR for mediport  Start R-CHOP after authorization   RV at start of chemo

## 2025-01-27 ENCOUNTER — TELEPHONE (OUTPATIENT)
Dept: ONCOLOGY | Age: 70
End: 2025-01-27

## 2025-01-27 NOTE — TELEPHONE ENCOUNTER
Name: Apurva Francis  : 1955  MRN: [id number]    Oncology Navigation Follow-Up Note    Contact Type:  Telephone    Notes: Writer spoke with IR , Rnad, Appt was made and pt was given appt details. 25 at [facility] arrive at 0800 NPO after midnight the night prior and pt will need a . Pt had general questions which were addressed to the best of writer's knowledge.     Electronically signed by Susan Tellez RN on 2025 at 10:39 AM

## 2025-01-30 ENCOUNTER — HOSPITAL ENCOUNTER (OUTPATIENT)
Age: 70
Discharge: HOME | End: 2025-02-01
Attending: INTERNAL MEDICINE
Payer: MEDICARE

## 2025-01-30 ENCOUNTER — HOSPITAL ENCOUNTER (OUTPATIENT)
Dept: NUCLEAR MEDICINE | Age: 70
Discharge: HOME | End: 2025-02-01
Attending: INTERNAL MEDICINE
Payer: MEDICARE

## 2025-01-30 ENCOUNTER — HOSPITAL ENCOUNTER (OUTPATIENT)
Dept: NUCLEAR MEDICINE | Age: 70
End: 2025-01-30
Attending: INTERNAL MEDICINE
Payer: MEDICARE

## 2025-01-30 VITALS — BODY MASS INDEX: 23.95 KG/M2 | HEIGHT: 66 IN | WEIGHT: 149 LBS

## 2025-01-30 DIAGNOSIS — R59.0 LYMPHADENOPATHY, AXILLARY: ICD-10-CM

## 2025-01-30 DIAGNOSIS — Z51.11 ENCOUNTER FOR ANTINEOPLASTIC CHEMOTHERAPY: ICD-10-CM

## 2025-01-30 DIAGNOSIS — C83.34 DIFFUSE LARGE B-CELL LYMPHOMA OF LYMPH NODES OF AXILLA (HCC): ICD-10-CM

## 2025-01-30 LAB
ECHO AO ROOT DIAM: 2.7 CM
ECHO AO ROOT INDEX: 1.53 CM/M2
ECHO AV AREA PEAK VELOCITY: 2.3 CM2
ECHO AV AREA VTI: 2.2 CM2
ECHO AV AREA/BSA PEAK VELOCITY: 1.3 CM2/M2
ECHO AV AREA/BSA VTI: 1.3 CM2/M2
ECHO AV MEAN GRADIENT: 3 MMHG
ECHO AV MEAN VELOCITY: 0.9 M/S
ECHO AV PEAK GRADIENT: 5 MMHG
ECHO AV PEAK VELOCITY: 1.2 M/S
ECHO AV VELOCITY RATIO: 0.75
ECHO AV VTI: 25 CM
ECHO BSA: 1.77 M2
ECHO IVC EXP: 1.2 CM
ECHO IVC INSP: 0.5 CM
ECHO LA AREA 2C: 15.6 CM2
ECHO LA AREA 4C: 14.9 CM2
ECHO LA DIAMETER INDEX: 1.7 CM/M2
ECHO LA DIAMETER: 3 CM
ECHO LA MAJOR AXIS: 4.4 CM
ECHO LA MINOR AXIS: 4.5 CM
ECHO LA TO AORTIC ROOT RATIO: 1.11
ECHO LA VOL BP: 41 ML (ref 22–52)
ECHO LA VOL MOD A2C: 45 ML (ref 22–52)
ECHO LA VOL MOD A4C: 38 ML (ref 22–52)
ECHO LA VOL/BSA BIPLANE: 23 ML/M2 (ref 16–34)
ECHO LA VOLUME INDEX MOD A2C: 26 ML/M2 (ref 16–34)
ECHO LA VOLUME INDEX MOD A4C: 22 ML/M2 (ref 16–34)
ECHO LV E' LATERAL VELOCITY: 5.33 CM/S
ECHO LV E' SEPTAL VELOCITY: 4.9 CM/S
ECHO LV EDV A2C: 89 ML
ECHO LV EDV A4C: 87 ML
ECHO LV EDV INDEX A4C: 49 ML/M2
ECHO LV EDV NDEX A2C: 51 ML/M2
ECHO LV EJECTION FRACTION A2C: 63 %
ECHO LV EJECTION FRACTION A4C: 63 %
ECHO LV EJECTION FRACTION BIPLANE: 63 % (ref 55–100)
ECHO LV ESV A2C: 33 ML
ECHO LV ESV A4C: 32 ML
ECHO LV ESV INDEX A2C: 19 ML/M2
ECHO LV ESV INDEX A4C: 18 ML/M2
ECHO LV FRACTIONAL SHORTENING: 31 % (ref 28–44)
ECHO LV GLOBAL LONGITUDINAL STRAIN (GLS): -19.9 %
ECHO LV INTERNAL DIMENSION DIASTOLE INDEX: 2.39 CM/M2
ECHO LV INTERNAL DIMENSION DIASTOLIC: 4.2 CM (ref 3.9–5.3)
ECHO LV INTERNAL DIMENSION SYSTOLIC INDEX: 1.65 CM/M2
ECHO LV INTERNAL DIMENSION SYSTOLIC: 2.9 CM
ECHO LV IVSD: 0.8 CM (ref 0.6–0.9)
ECHO LV MASS 2D: 101.3 G (ref 67–162)
ECHO LV MASS INDEX 2D: 57.6 G/M2 (ref 43–95)
ECHO LV POSTERIOR WALL DIASTOLIC: 0.8 CM (ref 0.6–0.9)
ECHO LV RELATIVE WALL THICKNESS RATIO: 0.38
ECHO LVOT AREA: 3.1 CM2
ECHO LVOT AV VTI INDEX: 0.71
ECHO LVOT DIAM: 2 CM
ECHO LVOT MEAN GRADIENT: 2 MMHG
ECHO LVOT PEAK GRADIENT: 3 MMHG
ECHO LVOT PEAK VELOCITY: 0.9 M/S
ECHO LVOT STROKE VOLUME INDEX: 31.8 ML/M2
ECHO LVOT SV: 55.9 ML
ECHO LVOT VTI: 17.8 CM
ECHO MV A VELOCITY: 0.74 M/S
ECHO MV E DECELERATION TIME (DT): 197 MS
ECHO MV E VELOCITY: 0.66 M/S
ECHO MV E/A RATIO: 0.89
ECHO MV E/E' LATERAL: 12.38
ECHO MV E/E' RATIO (AVERAGED): 12.93
ECHO MV E/E' SEPTAL: 13.47
ECHO RV BASAL DIMENSION: 3.2 CM
ECHO RV FREE WALL PEAK S': 12.8 CM/S

## 2025-01-30 PROCEDURE — 93306 TTE W/DOPPLER COMPLETE: CPT

## 2025-01-31 ENCOUNTER — HOSPITAL ENCOUNTER (OUTPATIENT)
Dept: NUCLEAR MEDICINE | Age: 70
End: 2025-01-31
Attending: INTERNAL MEDICINE
Payer: MEDICARE

## 2025-01-31 LAB — GLUCOSE BLD-MCNC: 95 MG/DL (ref 65–105)

## 2025-01-31 PROCEDURE — 78815 PET IMAGE W/CT SKULL-THIGH: CPT

## 2025-01-31 PROCEDURE — 82947 ASSAY GLUCOSE BLOOD QUANT: CPT

## 2025-01-31 PROCEDURE — 3430000000 HC RX DIAGNOSTIC RADIOPHARMACEUTICAL: Performed by: INTERNAL MEDICINE

## 2025-01-31 PROCEDURE — A9609 HC RX DIAGNOSTIC RADIOPHARMACEUTICAL: HCPCS | Performed by: INTERNAL MEDICINE

## 2025-01-31 PROCEDURE — 2500000003 HC RX 250 WO HCPCS: Performed by: INTERNAL MEDICINE

## 2025-01-31 RX ORDER — FLUDEOXYGLUCOSE F 18 200 MCI/ML
10 INJECTION, SOLUTION INTRAVENOUS
Status: COMPLETED | OUTPATIENT
Start: 2025-01-31 | End: 2025-01-31

## 2025-01-31 RX ORDER — SODIUM CHLORIDE 0.9 % (FLUSH) 0.9 %
10 SYRINGE (ML) INJECTION ONCE
Status: COMPLETED | OUTPATIENT
Start: 2025-01-31 | End: 2025-01-31

## 2025-01-31 RX ADMIN — SODIUM CHLORIDE, PRESERVATIVE FREE 10 ML: 5 INJECTION INTRAVENOUS at 12:11

## 2025-01-31 RX ADMIN — FLUDEOXYGLUCOSE F 18 11.34 MILLICURIE: 200 INJECTION, SOLUTION INTRAVENOUS at 12:11

## 2025-02-04 ENCOUNTER — HOSPITAL ENCOUNTER (OUTPATIENT)
Dept: INTERVENTIONAL RADIOLOGY/VASCULAR | Age: 70
Discharge: HOME | End: 2025-02-06
Payer: MEDICARE

## 2025-02-04 VITALS
TEMPERATURE: 97.7 F | OXYGEN SATURATION: 96 % | SYSTOLIC BLOOD PRESSURE: 133 MMHG | RESPIRATION RATE: 15 BRPM | DIASTOLIC BLOOD PRESSURE: 73 MMHG | HEIGHT: 66 IN | WEIGHT: 148 LBS | BODY MASS INDEX: 23.78 KG/M2 | HEART RATE: 85 BPM

## 2025-02-04 DIAGNOSIS — C83.34 DIFFUSE LARGE B-CELL LYMPHOMA OF LYMPH NODES OF AXILLA (HCC): ICD-10-CM

## 2025-02-04 DIAGNOSIS — R59.0 LYMPHADENOPATHY, AXILLARY: ICD-10-CM

## 2025-02-04 LAB
GLUCOSE BLD-MCNC: 124 MG/DL (ref 65–105)
INR PPP: 1.1
PLATELET # BLD AUTO: 237 K/UL (ref 138–453)
PROTHROMBIN TIME: 14.1 SEC (ref 11.5–14.2)

## 2025-02-04 PROCEDURE — 85049 AUTOMATED PLATELET COUNT: CPT

## 2025-02-04 PROCEDURE — C1769 GUIDE WIRE: HCPCS

## 2025-02-04 PROCEDURE — 6360000002 HC RX W HCPCS: Performed by: RADIOLOGY

## 2025-02-04 PROCEDURE — 7100000010 HC PHASE II RECOVERY - FIRST 15 MIN

## 2025-02-04 PROCEDURE — 36561 INSERT TUNNELED CV CATH: CPT

## 2025-02-04 PROCEDURE — 77001 FLUOROGUIDE FOR VEIN DEVICE: CPT

## 2025-02-04 PROCEDURE — 82947 ASSAY GLUCOSE BLOOD QUANT: CPT

## 2025-02-04 PROCEDURE — 99152 MOD SED SAME PHYS/QHP 5/>YRS: CPT

## 2025-02-04 PROCEDURE — 7100000011 HC PHASE II RECOVERY - ADDTL 15 MIN

## 2025-02-04 PROCEDURE — 76937 US GUIDE VASCULAR ACCESS: CPT

## 2025-02-04 PROCEDURE — 85610 PROTHROMBIN TIME: CPT

## 2025-02-04 PROCEDURE — 99153 MOD SED SAME PHYS/QHP EA: CPT

## 2025-02-04 PROCEDURE — 2500000003 HC RX 250 WO HCPCS: Performed by: RADIOLOGY

## 2025-02-04 RX ORDER — FENTANYL CITRATE 50 UG/ML
INJECTION, SOLUTION INTRAMUSCULAR; INTRAVENOUS PRN
Status: COMPLETED | OUTPATIENT
Start: 2025-02-04 | End: 2025-02-04

## 2025-02-04 RX ORDER — CEFAZOLIN SODIUM/WATER 2 G/20 ML
2000 SYRINGE (ML) INTRAVENOUS ONCE
Status: COMPLETED | OUTPATIENT
Start: 2025-02-04 | End: 2025-02-04

## 2025-02-04 RX ORDER — ONDANSETRON 2 MG/ML
INJECTION INTRAMUSCULAR; INTRAVENOUS PRN
Status: COMPLETED | OUTPATIENT
Start: 2025-02-04 | End: 2025-02-04

## 2025-02-04 RX ORDER — HEPARIN 100 UNIT/ML
SYRINGE INTRAVENOUS PRN
Status: COMPLETED | OUTPATIENT
Start: 2025-02-04 | End: 2025-02-04

## 2025-02-04 RX ORDER — SODIUM CHLORIDE 0.9 % (FLUSH) 0.9 %
5-40 SYRINGE (ML) INJECTION PRN
Status: DISCONTINUED | OUTPATIENT
Start: 2025-02-04 | End: 2025-02-07 | Stop reason: HOSPADM

## 2025-02-04 RX ADMIN — HEPARIN 500 UNITS: 100 SYRINGE at 10:04

## 2025-02-04 RX ADMIN — Medication 2000 MG: at 09:21

## 2025-02-04 RX ADMIN — ONDANSETRON 4 MG: 2 INJECTION, SOLUTION INTRAMUSCULAR; INTRAVENOUS at 10:28

## 2025-02-04 RX ADMIN — FENTANYL CITRATE 50 MCG: 50 INJECTION INTRAMUSCULAR; INTRAVENOUS at 09:25

## 2025-02-04 RX ADMIN — FENTANYL CITRATE 50 MCG: 50 INJECTION INTRAMUSCULAR; INTRAVENOUS at 09:35

## 2025-02-04 RX ADMIN — CEFAZOLIN 1000 MG: 1 INJECTION, POWDER, FOR SOLUTION INTRAMUSCULAR; INTRAVENOUS at 09:44

## 2025-02-04 ASSESSMENT — PAIN DESCRIPTION - DESCRIPTORS
DESCRIPTORS: DISCOMFORT
DESCRIPTORS: ACHING;SORE

## 2025-02-04 ASSESSMENT — PAIN - FUNCTIONAL ASSESSMENT
PAIN_FUNCTIONAL_ASSESSMENT: 0-10
PAIN_FUNCTIONAL_ASSESSMENT: 0-10
PAIN_FUNCTIONAL_ASSESSMENT: ACTIVITIES ARE NOT PREVENTED
PAIN_FUNCTIONAL_ASSESSMENT: 0-10
PAIN_FUNCTIONAL_ASSESSMENT: 0-10

## 2025-02-04 NOTE — H&P
Interval H&P Note    Pt Name: Apurva Francis  MRN: [id number]  YOB: 1955  Date of evaluation: 2/4/2025      [x] I have reviewed in EPIC the progress note by Dr. Beaza dated 01/10/2025 for an Interval History and Physical note.     [x] I have examined  Apurva Francis, a 69 y.o. female.There are no changes to the patient who is scheduled for IR PORT PLACEMENT by Dr. 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 Patient had right axillary lymph node biopsy completed 01/16/25 and results demonstrated high grade B-cell lymphoma. The patient denies new health changes, fever, chills, wheezing, cough, increased SOB, chest pain, open sores or wounds. Known diabetes, patient reports she wears a dexcom but does not remember what her blood sugar was exactly. States it was fine. Denies any current blood thinning medications.     Vital signs: /73   Pulse 86   Temp 97.7 °F (36.5 °C)   Resp 18   Ht 1.676 m (5' 6\")   Wt 67.1 kg (148 lb)   SpO2 95%   BMI 23.89 kg/m²     Allergies:  Codeine    Medications:    Prior to Admission medications    Medication Sig Start Date End Date Taking? Authorizing Provider   Omeprazole 20 MG TBDD Omeprazole   Yes Provider, MD Damián   vitamin D (ERGOCALCIFEROL) 1.25 MG (37031 UT) CAPS capsule Take 1 capsule by mouth once a week 10/16/24  Yes Mandi Orozco APRN - CNP   scopolamine (TRANSDERM-SCOP) transdermal patch Place 1 patch onto the skin every 72 hours 3/20/24  Yes Mandi Orozco APRN - CNP   tiZANidine (ZANAFLEX) 4 MG tablet Take 1 tablet by mouth 3 times daily 8/1/23  Yes Mandi Orozco APRN - CNP   MOUNJARO 10 MG/0.5ML SOAJ INJECT THE CONTENTS OF ONE PEN  SUBCUTANEOUSLY WEEKLY AS  DIRECTED 1/6/25   Mandi Orozco APRN - CNP   Handicap Placard MISC by Does not apply route Expires 4/2025 5/6/20   Gerdeman, Mandi, APRN - CNP   glucose blood VI test strips (ASCENSIA AUTODISC VI;ONE TOUCH ULTRA TEST

## 2025-02-04 NOTE — PRE SEDATION
Sedation Pre-Procedure Note    Patient Name: Apurva Francis   YOB: 1955  Room/Bed: Room/bed info not found  Medical Record Number: [id number]  Date: 2/4/2025   Time: 9:18 AM       Indication:  Left sided Port Placement    Consent: I have discussed with the patient and/or the patient representative the indication and the possible risks and/or complications of the planned procedure and the anesthesia methods. The patient and/or patient representative appear to understand and agree to proceed.    Vital Signs:   Vitals:    02/04/25 0746   BP: 110/73   Pulse: 86   Resp: 18   Temp: 97.7 °F (36.5 °C)   SpO2: 95%       Past Medical History:   has a past medical history of Diabetes mellitus (HCC), Fibromyalgia, GERD (gastroesophageal reflux disease), and Lymphoma (HCC).    Past Surgical History:   has a past surgical history that includes Carpal tunnel release; Ulnar tunnel release (Bilateral); Tubal ligation; Finger trigger release (Right, 12/07/2018); Finger trigger release (N/A, 12/7/2018); and US BIOPSY LYMPH NODE (N/A, 1/16/2025).    Medications:   Scheduled Meds:    ceFAZolin  2,000 mg IntraVENous Once     Continuous Infusions:   PRN Meds: sodium chloride flush  Home Meds:   Prior to Admission medications    Medication Sig Start Date End Date Taking? Authorizing Provider   Omeprazole 20 MG TBDD Omeprazole   Yes Provider, MD Damián   vitamin D (ERGOCALCIFEROL) 1.25 MG (78107 UT) CAPS capsule Take 1 capsule by mouth once a week 10/16/24  Yes Mandi Orozco APRN - CNP   scopolamine (TRANSDERM-SCOP) transdermal patch Place 1 patch onto the skin every 72 hours 3/20/24  Yes Mandi Orozco APRN - CNP   tiZANidine (ZANAFLEX) 4 MG tablet Take 1 tablet by mouth 3 times daily 8/1/23  Yes Mandi Orozco APRN - CNP   MOUNJARO 10 MG/0.5ML SOAJ INJECT THE CONTENTS OF ONE PEN  SUBCUTANEOUSLY WEEKLY AS  DIRECTED 1/6/25   Mandi Orozco APRN - CNP   Handicap Placard MISC by Does not apply route

## 2025-02-04 NOTE — BRIEF OP NOTE
Brief Postoperative Note for Port Placement    Apurva Francis  YOB: 1955  [id number]    Pre-operative Diagnosis: Lymphadenopathy      Post-operative Diagnosis: Same    Procedure: 8 Fr Port Placement    Medication Given: fentanyl    Anesthesia: 1 %Lidocaine, 1% Lidocaine w/ Epi    Surgeons/Assistants: Dr. Mckeon     Estimated Blood Loss: Minimal    Complications: none    Specimen Removal: None    8 Fr Port placement  via the left internal jugular vein. Port flushed with heparin.  Incision site closed with Vicryl sutures and tissue adhesive. Vital signs were reviewed and were stable after the procedure. Patient tolerated the procedure well.    Electronically signed by Fanny Jackson PA-C PA-C on 2/4/2025 at 10:32 AM

## 2025-02-04 NOTE — POST SEDATION
Sedation Post Procedure Note    Patient Name: Apurva Francsi   YOB: 1955  Room/Bed: Room/bed info not found  Medical Record Number: [id number]  Date: 2/4/2025   Time: 10:26 AM         Physicians/Assistants: Fanny Jackson PA-C, MD    Procedure Performed:  Left-sided Port Placement    Post-Sedation Vital Signs:  Vitals:    02/04/25 1015   BP: 118/70   Pulse: 82   Resp: 12   Temp:    SpO2: 99%      Vital signs were reviewed and were stable after the procedure (see flow sheet for vitals)            Post-Sedation Exam: breathing spontaneously, alert and oriented, communicating well.           Complications: none    Electronically signed by Fanny Jackson PA-C on 2/4/2025 at 10:26 AM

## 2025-02-05 ENCOUNTER — TELEPHONE (OUTPATIENT)
Dept: ONCOLOGY | Age: 70
End: 2025-02-05

## 2025-02-05 PROBLEM — C83.34 DIFFUSE LARGE B-CELL LYMPHOMA OF LYMPH NODES OF AXILLA (HCC): Status: ACTIVE | Noted: 2025-02-05

## 2025-02-05 RX ORDER — PROCHLORPERAZINE EDISYLATE 5 MG/ML
5 INJECTION INTRAMUSCULAR; INTRAVENOUS
OUTPATIENT
Start: 2025-02-05

## 2025-02-05 RX ORDER — FAMOTIDINE 10 MG/ML
20 INJECTION, SOLUTION INTRAVENOUS
OUTPATIENT
Start: 2025-02-05

## 2025-02-05 RX ORDER — PALONOSETRON 0.05 MG/ML
0.25 INJECTION, SOLUTION INTRAVENOUS ONCE
OUTPATIENT
Start: 2025-02-05 | End: 2025-02-05

## 2025-02-05 RX ORDER — SODIUM CHLORIDE 9 MG/ML
5-250 INJECTION, SOLUTION INTRAVENOUS PRN
OUTPATIENT
Start: 2025-02-05

## 2025-02-05 RX ORDER — ALBUTEROL SULFATE 90 UG/1
4 INHALANT RESPIRATORY (INHALATION) PRN
OUTPATIENT
Start: 2025-02-05

## 2025-02-05 RX ORDER — ACETAMINOPHEN 325 MG/1
650 TABLET ORAL
OUTPATIENT
Start: 2025-02-05

## 2025-02-05 RX ORDER — SODIUM CHLORIDE 9 MG/ML
INJECTION, SOLUTION INTRAVENOUS CONTINUOUS
OUTPATIENT
Start: 2025-02-05

## 2025-02-05 RX ORDER — HEPARIN SODIUM (PORCINE) LOCK FLUSH IV SOLN 100 UNIT/ML 100 UNIT/ML
500 SOLUTION INTRAVENOUS PRN
OUTPATIENT
Start: 2025-02-05

## 2025-02-05 RX ORDER — EPINEPHRINE 1 MG/ML
0.3 INJECTION, SOLUTION, CONCENTRATE INTRAVENOUS PRN
OUTPATIENT
Start: 2025-02-05

## 2025-02-05 RX ORDER — DIPHENHYDRAMINE HYDROCHLORIDE 50 MG/ML
50 INJECTION INTRAMUSCULAR; INTRAVENOUS ONCE
OUTPATIENT
Start: 2025-02-05 | End: 2025-02-05

## 2025-02-05 RX ORDER — HYDROCORTISONE SODIUM SUCCINATE 100 MG/2ML
100 INJECTION INTRAMUSCULAR; INTRAVENOUS
OUTPATIENT
Start: 2025-02-05

## 2025-02-05 RX ORDER — SODIUM CHLORIDE 0.9 % (FLUSH) 0.9 %
5-40 SYRINGE (ML) INJECTION PRN
OUTPATIENT
Start: 2025-02-05

## 2025-02-05 RX ORDER — ONDANSETRON 2 MG/ML
8 INJECTION INTRAMUSCULAR; INTRAVENOUS
OUTPATIENT
Start: 2025-02-05

## 2025-02-05 RX ORDER — DOXORUBICIN HYDROCHLORIDE 2 MG/ML
50 INJECTION, SOLUTION INTRAVENOUS ONCE
OUTPATIENT
Start: 2025-02-05 | End: 2025-02-05

## 2025-02-05 RX ORDER — ACETAMINOPHEN 325 MG/1
650 TABLET ORAL ONCE
OUTPATIENT
Start: 2025-02-05 | End: 2025-02-05

## 2025-02-05 RX ORDER — MEPERIDINE HYDROCHLORIDE 50 MG/ML
12.5 INJECTION INTRAMUSCULAR; INTRAVENOUS; SUBCUTANEOUS PRN
OUTPATIENT
Start: 2025-02-05

## 2025-02-05 RX ORDER — DIPHENHYDRAMINE HYDROCHLORIDE 50 MG/ML
50 INJECTION INTRAMUSCULAR; INTRAVENOUS
OUTPATIENT
Start: 2025-02-05

## 2025-02-05 NOTE — TELEPHONE ENCOUNTER
Name: Apurva Francis  : 1955  MRN: [id number]    Oncology Navigation Follow-Up Note    Contact Type:  Telephone    Notes: -Pt called with concerns as to hy she isnt scheduled for any oncology aptps. Writer explained that chemo orders were placed and we have to wait for the PA to come back prior to scheduling. Explained typical timeline for this. We also discussed other support services and pt does not feel she needs these at this time. Pt denied other questions at this time. Encouraged pt to call navigator as needed with any questions, concerns or barriers. Confirmed pt has navigator's contact information.         Electronically signed by Susan Tellez RN on 2025 at 1:52 PM

## 2025-02-05 NOTE — PROGRESS NOTES
_  Chief Complaint   Patient presents with    Follow-up     Review disease status     DIAGNOSIS:        High grade Diffuse large B-cell NHL. Negative for MYC rearrangement.   Extensive right axillary lymphadenopathy.   CURRENT THERAPY:         Work up in progress  Start R-CHOP  BRIEF CASE HISTORY:      Ms. Apurva Francis is a very pleasant 69 y.o. female with history of multiple co morbidities as listed.  Patient is referred for evaluation and further management of axillary lymphadenopathy.  Patient states she felt a lump in the right axillary area October 2024.  Lump was consistent with a enlarging lymph node.  She had no trauma or skin infections.  Recently she had some discomfort and mild pain in the axillary lymph node area.  She denies any lumps or bumps in the breast.  No skin infections or cellulitis.  No fever or night sweats.  No other areas of lymphadenopathy.  No weight loss or decreased appetite.  The patient had diagnostic mammogram and ultrasound and she was found to have several enlarged lymph nodes in the right axilla with the largest measuring 4.9 cm.  Patient scheduled for lymph node biopsy January 16, 2024.  The previous mammogram from June 2023 was negative.  Patient denies smoking or alcohol drinking.   INTERIM HISTORY:    Seen for follow up NHL. She had axillary LN biopsy. Results as above. Clinically continues to have rt axillary LN enlargement. No changes. No fever or night sweats. No weight loss.        PAST MEDICAL HISTORY: has a past medical history of Diabetes mellitus (HCC), Fibromyalgia, GERD (gastroesophageal reflux disease), and Lymphoma (HCC).    PAST SURGICAL HISTORY: has a past surgical history that includes Carpal tunnel release; Ulnar tunnel release (Bilateral); Tubal ligation; Finger trigger release (Right, 12/07/2018); Finger trigger release (N/A, 12/7/2018); US BIOPSY LYMPH NODE (N/A,

## 2025-02-10 ENCOUNTER — TELEPHONE (OUTPATIENT)
Dept: ONCOLOGY | Age: 70
End: 2025-02-10

## 2025-02-10 NOTE — TELEPHONE ENCOUNTER
Name: Apurva Francis  : 1955  MRN: [id number]    Oncology Navigation Follow-Up Note    Contact Type:  Telephone    Notes: Writer is requesting a script for cranial prosthesis. She spoke with her insurance and they cover wigs. Writer will forward message to triage nurse for script.     We discussed port concerns. Pt denied s/s of infection but does reports tenderness/pain. Writer encouraged her to use OTC medications to help control discomfort.       Electronically signed by Susan Tellez RN on 2/10/2025 at 11:19 AM

## 2025-02-11 ENCOUNTER — CLINICAL DOCUMENTATION (OUTPATIENT)
Facility: HOSPITAL | Age: 70
End: 2025-02-11

## 2025-02-11 NOTE — PROGRESS NOTES
Spoke with pt. Discussed foundation assistance programs. Confirmed insurance and income information. Obtained permission to apply. Will follow up with patient.

## 2025-02-12 ENCOUNTER — TELEPHONE (OUTPATIENT)
Dept: ONCOLOGY | Age: 70
End: 2025-02-12

## 2025-02-12 NOTE — TELEPHONE ENCOUNTER
Name: Apurva Francis  : 1955  MRN: [id number]    Oncology Navigation Follow-Up Note    Contact Type:  Telephone    Notes: Received call from Vandana in member's services at [facility]. She stated that it shows on the insurance end that PA has been approved. She provided writer with contact number of [phone] and Reference # [id number] and [id number]. Writer sent urgent email to Jocelynn LAWSON in PA dept with above information.       Electronically signed by Susan Tellez RN on 2025 at 9:59 AM

## 2025-02-14 ENCOUNTER — HOSPITAL ENCOUNTER (OUTPATIENT)
Age: 70
Discharge: HOME | End: 2025-02-14
Payer: MEDICARE

## 2025-02-14 DIAGNOSIS — C83.34 DIFFUSE LARGE B-CELL LYMPHOMA OF LYMPH NODES OF AXILLA (HCC): ICD-10-CM

## 2025-02-14 LAB
ALBUMIN SERPL-MCNC: 3.9 G/DL (ref 3.5–5.2)
ALBUMIN/GLOB SERPL: 1.1 {RATIO} (ref 1–2.5)
ALP SERPL-CCNC: 68 U/L (ref 35–104)
ALT SERPL-CCNC: 14 U/L (ref 5–33)
ANION GAP SERPL CALCULATED.3IONS-SCNC: 12 MMOL/L (ref 9–17)
AST SERPL-CCNC: 35 U/L
BASOPHILS # BLD: 0.06 K/UL (ref 0–0.2)
BASOPHILS NFR BLD: 1 % (ref 0–2)
BILIRUB SERPL-MCNC: 0.8 MG/DL (ref 0.3–1.2)
BUN SERPL-MCNC: 10 MG/DL (ref 8–23)
CALCIUM SERPL-MCNC: 9.7 MG/DL (ref 8.6–10.4)
CHLORIDE SERPL-SCNC: 100 MMOL/L (ref 98–107)
CO2 SERPL-SCNC: 27 MMOL/L (ref 20–31)
CREAT SERPL-MCNC: 0.6 MG/DL (ref 0.5–0.9)
EOSINOPHIL # BLD: 0.36 K/UL (ref 0–0.44)
EOSINOPHILS RELATIVE PERCENT: 6 % (ref 1–4)
ERYTHROCYTE [DISTWIDTH] IN BLOOD BY AUTOMATED COUNT: 13.4 % (ref 11.8–14.4)
GFR, ESTIMATED: >90 ML/MIN/1.73M2
GLUCOSE SERPL-MCNC: 102 MG/DL (ref 70–99)
HBV CORE AB SER QL: NONREACTIVE
HBV SURFACE AB SERPL IA-ACNC: <3.5 MIU/ML
HBV SURFACE AG SERPL QL IA: NONREACTIVE
HCT VFR BLD AUTO: 41.5 % (ref 36.3–47.1)
HGB BLD-MCNC: 13.6 G/DL (ref 11.9–15.1)
IMM GRANULOCYTES # BLD AUTO: <0.03 K/UL (ref 0–0.3)
IMM GRANULOCYTES NFR BLD: 0 %
LYMPHOCYTES NFR BLD: 1.43 K/UL (ref 1.1–3.7)
LYMPHOCYTES RELATIVE PERCENT: 22 % (ref 24–43)
MCH RBC QN AUTO: 28.9 PG (ref 25.2–33.5)
MCHC RBC AUTO-ENTMCNC: 32.8 G/DL (ref 28.4–34.8)
MCV RBC AUTO: 88.1 FL (ref 82.6–102.9)
MONOCYTES NFR BLD: 0.61 K/UL (ref 0.1–1.2)
MONOCYTES NFR BLD: 9 % (ref 3–12)
NEUTROPHILS NFR BLD: 62 % (ref 36–65)
NEUTS SEG NFR BLD: 4.08 K/UL (ref 1.5–8.1)
NRBC BLD-RTO: 0 PER 100 WBC
PLATELET # BLD AUTO: 294 K/UL (ref 138–453)
PMV BLD AUTO: 9.6 FL (ref 8.1–13.5)
POTASSIUM SERPL-SCNC: 3.8 MMOL/L (ref 3.7–5.3)
PROT SERPL-MCNC: 7.4 G/DL (ref 6.4–8.3)
RBC # BLD AUTO: 4.71 M/UL (ref 3.95–5.11)
SODIUM SERPL-SCNC: 139 MMOL/L (ref 135–144)
WBC OTHER # BLD: 6.6 K/UL (ref 3.5–11.3)

## 2025-02-14 PROCEDURE — 36415 COLL VENOUS BLD VENIPUNCTURE: CPT

## 2025-02-14 PROCEDURE — 80053 COMPREHEN METABOLIC PANEL: CPT

## 2025-02-14 PROCEDURE — 86317 IMMUNOASSAY INFECTIOUS AGENT: CPT

## 2025-02-14 PROCEDURE — 86704 HEP B CORE ANTIBODY TOTAL: CPT

## 2025-02-14 PROCEDURE — 87340 HEPATITIS B SURFACE AG IA: CPT

## 2025-02-14 PROCEDURE — 85025 COMPLETE CBC W/AUTO DIFF WBC: CPT

## 2025-02-14 SDOH — ECONOMIC STABILITY: INCOME INSECURITY: IN THE LAST 12 MONTHS, WAS THERE A TIME WHEN YOU WERE NOT ABLE TO PAY THE MORTGAGE OR RENT ON TIME?: NO

## 2025-02-14 SDOH — ECONOMIC STABILITY: FOOD INSECURITY: WITHIN THE PAST 12 MONTHS, THE FOOD YOU BOUGHT JUST DIDN'T LAST AND YOU DIDN'T HAVE MONEY TO GET MORE.: NEVER TRUE

## 2025-02-14 SDOH — ECONOMIC STABILITY: TRANSPORTATION INSECURITY
IN THE PAST 12 MONTHS, HAS THE LACK OF TRANSPORTATION KEPT YOU FROM MEDICAL APPOINTMENTS OR FROM GETTING MEDICATIONS?: NO

## 2025-02-14 SDOH — HEALTH STABILITY: PHYSICAL HEALTH: ON AVERAGE, HOW MANY MINUTES DO YOU ENGAGE IN EXERCISE AT THIS LEVEL?: 20 MIN

## 2025-02-14 SDOH — ECONOMIC STABILITY: FOOD INSECURITY: WITHIN THE PAST 12 MONTHS, YOU WORRIED THAT YOUR FOOD WOULD RUN OUT BEFORE YOU GOT MONEY TO BUY MORE.: NEVER TRUE

## 2025-02-14 SDOH — HEALTH STABILITY: PHYSICAL HEALTH: ON AVERAGE, HOW MANY DAYS PER WEEK DO YOU ENGAGE IN MODERATE TO STRENUOUS EXERCISE (LIKE A BRISK WALK)?: 2 DAYS

## 2025-02-14 SDOH — ECONOMIC STABILITY: TRANSPORTATION INSECURITY
IN THE PAST 12 MONTHS, HAS LACK OF TRANSPORTATION KEPT YOU FROM MEETINGS, WORK, OR FROM GETTING THINGS NEEDED FOR DAILY LIVING?: NO

## 2025-02-14 ASSESSMENT — LIFESTYLE VARIABLES
HOW MANY STANDARD DRINKS CONTAINING ALCOHOL DO YOU HAVE ON A TYPICAL DAY: 0
HOW OFTEN DO YOU HAVE A DRINK CONTAINING ALCOHOL: 2
HOW OFTEN DO YOU HAVE SIX OR MORE DRINKS ON ONE OCCASION: 1
HOW OFTEN DO YOU HAVE A DRINK CONTAINING ALCOHOL: MONTHLY OR LESS
HOW MANY STANDARD DRINKS CONTAINING ALCOHOL DO YOU HAVE ON A TYPICAL DAY: PATIENT DOES NOT DRINK

## 2025-02-14 ASSESSMENT — PATIENT HEALTH QUESTIONNAIRE - PHQ9
1. LITTLE INTEREST OR PLEASURE IN DOING THINGS: NOT AT ALL
2. FEELING DOWN, DEPRESSED OR HOPELESS: NOT AT ALL
SUM OF ALL RESPONSES TO PHQ QUESTIONS 1-9: 0
SUM OF ALL RESPONSES TO PHQ9 QUESTIONS 1 & 2: 0

## 2025-02-17 ENCOUNTER — OFFICE VISIT (OUTPATIENT)
Dept: PRIMARY CARE CLINIC | Age: 70
End: 2025-02-17
Payer: MEDICARE

## 2025-02-17 VITALS
HEART RATE: 89 BPM | BODY MASS INDEX: 24.4 KG/M2 | OXYGEN SATURATION: 96 % | SYSTOLIC BLOOD PRESSURE: 116 MMHG | DIASTOLIC BLOOD PRESSURE: 76 MMHG | HEIGHT: 66 IN | WEIGHT: 151.8 LBS

## 2025-02-17 DIAGNOSIS — R59.9 ENLARGED LYMPH NODES, UNSPECIFIED: ICD-10-CM

## 2025-02-17 DIAGNOSIS — Z00.00 ENCOUNTER FOR GENERAL ADULT MEDICAL EXAMINATION W/O ABNORMAL FINDINGS: Primary | ICD-10-CM

## 2025-02-17 DIAGNOSIS — C83.34 DIFFUSE LARGE B-CELL LYMPHOMA OF LYMPH NODES OF AXILLA (HCC): ICD-10-CM

## 2025-02-17 DIAGNOSIS — R22.31 AXILLARY MASS, RIGHT: ICD-10-CM

## 2025-02-17 DIAGNOSIS — E78.5 HYPERLIPIDEMIA, UNSPECIFIED HYPERLIPIDEMIA TYPE: ICD-10-CM

## 2025-02-17 DIAGNOSIS — E11.9 TYPE 2 DIABETES MELLITUS WITHOUT COMPLICATION, WITHOUT LONG-TERM CURRENT USE OF INSULIN (HCC): ICD-10-CM

## 2025-02-17 LAB — HBA1C MFR BLD: 5.5 %

## 2025-02-17 PROCEDURE — 3074F SYST BP LT 130 MM HG: CPT | Performed by: NURSE PRACTITIONER

## 2025-02-17 PROCEDURE — 3017F COLORECTAL CA SCREEN DOC REV: CPT | Performed by: NURSE PRACTITIONER

## 2025-02-17 PROCEDURE — 1160F RVW MEDS BY RX/DR IN RCRD: CPT | Performed by: NURSE PRACTITIONER

## 2025-02-17 PROCEDURE — 1159F MED LIST DOCD IN RCRD: CPT | Performed by: NURSE PRACTITIONER

## 2025-02-17 PROCEDURE — G0439 PPPS, SUBSEQ VISIT: HCPCS | Performed by: NURSE PRACTITIONER

## 2025-02-17 PROCEDURE — 3044F HG A1C LEVEL LT 7.0%: CPT | Performed by: NURSE PRACTITIONER

## 2025-02-17 PROCEDURE — 1123F ACP DISCUSS/DSCN MKR DOCD: CPT | Performed by: NURSE PRACTITIONER

## 2025-02-17 PROCEDURE — 3078F DIAST BP <80 MM HG: CPT | Performed by: NURSE PRACTITIONER

## 2025-02-17 PROCEDURE — 83036 HEMOGLOBIN GLYCOSYLATED A1C: CPT | Performed by: NURSE PRACTITIONER

## 2025-02-17 RX ORDER — MV-MIN NO.113/IRON/FOLIC ACID 4.5 MG-2
CAPSULE ORAL
COMMUNITY

## 2025-02-17 RX ORDER — HYDROCHLOROTHIAZIDE 12.5 MG/1
1 CAPSULE ORAL
Qty: 9 EACH | Refills: 3 | Status: SHIPPED | OUTPATIENT
Start: 2025-02-17

## 2025-02-17 NOTE — PROGRESS NOTES
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     Subjective   The following acute and/or chronic problems were also addressed today:  See below    Patient's complete Health Risk Assessment and screening values have been reviewed and are found in Flowsheets. The following problems were reviewed today and where indicated follow up appointments were made and/or referrals ordered.    Positive Risk Factor Screenings with Interventions:              Inactivity:  On average, how many days per week do you engage in moderate to strenuous exercise (like a brisk walk)?: 2 days (!) Abnormal  On average, how many minutes do you engage in exercise at this level?: 20 min    Interventions:  Patient declined any further interventions or treatment                         Objective   Vitals:    02/17/25 1027   BP: 116/76   Pulse: 89   SpO2: 96%   Weight: 68.9 kg (151 lb 12.8 oz)   Height: 1.676 m (5' 6\")      Body mass index is 24.5 kg/m².        General Appearance: alert and oriented to person, place and time, well developed and well- nourished, in no acute distress  Skin: warm and dry, no rash or erythema  Head: normocephalic and atraumatic  Eyes: pupils equal, round, and reactive to light, extraocular eye movements intact, conjunctivae normal  ENT: tympanic membrane, external ear and ear canal normal bilaterally,

## 2025-02-20 DIAGNOSIS — C83.34 DIFFUSE LARGE B-CELL LYMPHOMA OF LYMPH NODES OF AXILLA (HCC): Primary | ICD-10-CM

## 2025-02-21 ENCOUNTER — TELEPHONE (OUTPATIENT)
Dept: INFUSION THERAPY | Age: 70
End: 2025-02-21

## 2025-02-21 ENCOUNTER — OFFICE VISIT (OUTPATIENT)
Dept: ONCOLOGY | Age: 70
End: 2025-02-21

## 2025-02-21 DIAGNOSIS — C83.34 DIFFUSE LARGE B-CELL LYMPHOMA OF LYMPH NODES OF AXILLA (HCC): Primary | ICD-10-CM

## 2025-02-21 RX ORDER — ONDANSETRON 8 MG/1
8 TABLET, ORALLY DISINTEGRATING ORAL EVERY 8 HOURS PRN
Qty: 90 TABLET | Refills: 3 | Status: SHIPPED | OUTPATIENT
Start: 2025-02-21

## 2025-02-21 RX ORDER — LIDOCAINE AND PRILOCAINE 25; 25 MG/G; MG/G
CREAM TOPICAL
Qty: 1 EACH | Refills: 2 | Status: SHIPPED | OUTPATIENT
Start: 2025-02-21

## 2025-02-21 RX ORDER — ALLOPURINOL 300 MG/1
300 TABLET ORAL SEE ADMIN INSTRUCTIONS
Qty: 90 TABLET | Refills: 0 | Status: SHIPPED | OUTPATIENT
Start: 2025-02-21

## 2025-02-21 RX ORDER — PROCHLORPERAZINE MALEATE 10 MG
10 TABLET ORAL EVERY 6 HOURS PRN
Qty: 120 TABLET | Refills: 3 | Status: SHIPPED | OUTPATIENT
Start: 2025-02-21

## 2025-02-21 RX ORDER — PREDNISONE 50 MG/1
100 TABLET ORAL DAILY
Qty: 10 TABLET | Refills: 0 | Status: SHIPPED | OUTPATIENT
Start: 2025-02-21 | End: 2025-03-03

## 2025-02-21 NOTE — TELEPHONE ENCOUNTER
Chemotherapy orders received:    Bz=023.6 cm Wt=68.9 kg BSA=1.79  Chemotherapy doses verified:     Ruxience 375 mg/m2 =672 mg  Joseph 50 mg/m2 = 90 mg  Cytoxan 750 mg/m2 = 1342 mg  Vincristine 2 mg flat dose    Katarina Hutchins RN

## 2025-02-21 NOTE — TELEPHONE ENCOUNTER
Chemotherapy orders received:     Pa=054.6 cm Wt=68.9 kg BSA=1.79  Chemotherapy doses verified:      Ruxience 375 mg/m2 =672 mg  Joseph 50 mg/m2 = 90 mg  Cytoxan 750 mg/m2 = 1342 mg  Vincristine 2 mg flat dose     Latesha Simmons RN

## 2025-02-21 NOTE — PROGRESS NOTES
Apurva was here for chemotherapy teaching. Spent 60 minutes with them.  Teaching sheets from [facility] and verbal information given on chemotherapy agents, action, administration and side effects.    Chemotherapy medications discussed: ruxience, ruiz, vincristine, cytoxan    Pregnancy warnings reviewed: n/a    Chemotherapy consent form signed by patient.    Provided new patient binder, Chemotherapy and You booklet, Eating Hints booklet      Port placement: 2/21  Pt has prescription for EMLA cream and was given instructions on use.    Reviewed take home medication: prednisone and allopurinol    Questions answered and support given.    [facility] rehab referral assessment form, distress tool form and PG-SGA form completed by patient.    Needs that were identified during teaching visit: No needs identified at this time.    Discussed community resources such as [facility], [facility], [facility], [facility], [employer], etc.    Pt will return on 2/24/25 for C1D1 rchop and f/u with Dr. Baeza on 2/24/25.

## 2025-02-24 ENCOUNTER — OFFICE VISIT (OUTPATIENT)
Dept: ONCOLOGY | Age: 70
End: 2025-02-24
Payer: MEDICARE

## 2025-02-24 ENCOUNTER — HOSPITAL ENCOUNTER (OUTPATIENT)
Dept: INFUSION THERAPY | Age: 70
Discharge: HOME | End: 2025-02-24
Payer: MEDICARE

## 2025-02-24 VITALS
RESPIRATION RATE: 18 BRPM | OXYGEN SATURATION: 94 % | BODY MASS INDEX: 24.07 KG/M2 | SYSTOLIC BLOOD PRESSURE: 133 MMHG | HEART RATE: 91 BPM | DIASTOLIC BLOOD PRESSURE: 80 MMHG | WEIGHT: 149.1 LBS | TEMPERATURE: 97.7 F

## 2025-02-24 VITALS
OXYGEN SATURATION: 99 % | DIASTOLIC BLOOD PRESSURE: 76 MMHG | TEMPERATURE: 99.1 F | SYSTOLIC BLOOD PRESSURE: 131 MMHG | RESPIRATION RATE: 16 BRPM | HEART RATE: 106 BPM

## 2025-02-24 DIAGNOSIS — C83.34 DIFFUSE LARGE B-CELL LYMPHOMA OF LYMPH NODES OF AXILLA (HCC): ICD-10-CM

## 2025-02-24 DIAGNOSIS — C83.34 DIFFUSE LARGE B-CELL LYMPHOMA OF LYMPH NODES OF AXILLA (HCC): Primary | ICD-10-CM

## 2025-02-24 LAB
ALBUMIN SERPL-MCNC: 3.7 G/DL (ref 3.5–5.2)
ALBUMIN/GLOB SERPL: 1.2 {RATIO} (ref 1–2.5)
ALP SERPL-CCNC: 71 U/L (ref 35–104)
ALT SERPL-CCNC: 15 U/L (ref 5–33)
ANION GAP SERPL CALCULATED.3IONS-SCNC: 11 MMOL/L (ref 9–17)
AST SERPL-CCNC: 32 U/L
BASOPHILS # BLD: 0.1 K/UL (ref 0–0.2)
BASOPHILS NFR BLD: 1 % (ref 0–2)
BILIRUB SERPL-MCNC: 0.8 MG/DL (ref 0.3–1.2)
BUN SERPL-MCNC: 13 MG/DL (ref 8–23)
CALCIUM SERPL-MCNC: 9.3 MG/DL (ref 8.6–10.4)
CHLORIDE SERPL-SCNC: 104 MMOL/L (ref 98–107)
CO2 SERPL-SCNC: 25 MMOL/L (ref 20–31)
CREAT SERPL-MCNC: 0.8 MG/DL (ref 0.5–0.9)
EOSINOPHIL # BLD: 0.4 K/UL (ref 0–0.4)
EOSINOPHILS RELATIVE PERCENT: 7 % (ref 1–4)
ERYTHROCYTE [DISTWIDTH] IN BLOOD BY AUTOMATED COUNT: 13.7 % (ref 12.5–15.4)
GFR, ESTIMATED: 79 ML/MIN/1.73M2
GLUCOSE SERPL-MCNC: 146 MG/DL (ref 70–99)
HCT VFR BLD AUTO: 36.4 % (ref 36–46)
HGB BLD-MCNC: 12.5 G/DL (ref 12–16)
LYMPHOCYTES NFR BLD: 1.2 K/UL (ref 1–4.8)
LYMPHOCYTES RELATIVE PERCENT: 19 % (ref 24–44)
MCH RBC QN AUTO: 29.9 PG (ref 26–34)
MCHC RBC AUTO-ENTMCNC: 34.4 G/DL (ref 31–37)
MCV RBC AUTO: 86.8 FL (ref 80–100)
MONOCYTES NFR BLD: 0.6 K/UL (ref 0.1–1.2)
MONOCYTES NFR BLD: 10 % (ref 2–11)
NEUTROPHILS NFR BLD: 63 % (ref 36–66)
NEUTS SEG NFR BLD: 3.9 K/UL (ref 1.8–7.7)
PLATELET # BLD AUTO: 270 K/UL (ref 140–450)
PMV BLD AUTO: 7.3 FL (ref 6–12)
POTASSIUM SERPL-SCNC: 3.8 MMOL/L (ref 3.7–5.3)
PROT SERPL-MCNC: 6.8 G/DL (ref 6.4–8.3)
RBC # BLD AUTO: 4.19 M/UL (ref 4–5.2)
SODIUM SERPL-SCNC: 140 MMOL/L (ref 135–144)
WBC OTHER # BLD: 6.1 K/UL (ref 3.5–11)

## 2025-02-24 PROCEDURE — G8399 PT W/DXA RESULTS DOCUMENT: HCPCS | Performed by: INTERNAL MEDICINE

## 2025-02-24 PROCEDURE — G8427 DOCREV CUR MEDS BY ELIG CLIN: HCPCS | Performed by: INTERNAL MEDICINE

## 2025-02-24 PROCEDURE — 96375 TX/PRO/DX INJ NEW DRUG ADDON: CPT

## 2025-02-24 PROCEDURE — 96415 CHEMO IV INFUSION ADDL HR: CPT

## 2025-02-24 PROCEDURE — 96417 CHEMO IV INFUS EACH ADDL SEQ: CPT

## 2025-02-24 PROCEDURE — 1090F PRES/ABSN URINE INCON ASSESS: CPT | Performed by: INTERNAL MEDICINE

## 2025-02-24 PROCEDURE — 3017F COLORECTAL CA SCREEN DOC REV: CPT | Performed by: INTERNAL MEDICINE

## 2025-02-24 PROCEDURE — 2580000003 HC RX 258: Performed by: INTERNAL MEDICINE

## 2025-02-24 PROCEDURE — 6360000002 HC RX W HCPCS: Performed by: INTERNAL MEDICINE

## 2025-02-24 PROCEDURE — 1159F MED LIST DOCD IN RCRD: CPT | Performed by: INTERNAL MEDICINE

## 2025-02-24 PROCEDURE — 6370000000 HC RX 637 (ALT 250 FOR IP): Performed by: INTERNAL MEDICINE

## 2025-02-24 PROCEDURE — 99211 OFF/OP EST MAY X REQ PHY/QHP: CPT | Performed by: INTERNAL MEDICINE

## 2025-02-24 PROCEDURE — G8420 CALC BMI NORM PARAMETERS: HCPCS | Performed by: INTERNAL MEDICINE

## 2025-02-24 PROCEDURE — 96367 TX/PROPH/DG ADDL SEQ IV INF: CPT

## 2025-02-24 PROCEDURE — 85025 COMPLETE CBC W/AUTO DIFF WBC: CPT

## 2025-02-24 PROCEDURE — 96411 CHEMO IV PUSH ADDL DRUG: CPT

## 2025-02-24 PROCEDURE — 80053 COMPREHEN METABOLIC PANEL: CPT

## 2025-02-24 PROCEDURE — 2500000003 HC RX 250 WO HCPCS: Performed by: INTERNAL MEDICINE

## 2025-02-24 PROCEDURE — 96376 TX/PRO/DX INJ SAME DRUG ADON: CPT

## 2025-02-24 PROCEDURE — 96413 CHEMO IV INFUSION 1 HR: CPT

## 2025-02-24 PROCEDURE — 1125F AMNT PAIN NOTED PAIN PRSNT: CPT | Performed by: INTERNAL MEDICINE

## 2025-02-24 PROCEDURE — 1036F TOBACCO NON-USER: CPT | Performed by: INTERNAL MEDICINE

## 2025-02-24 PROCEDURE — 3079F DIAST BP 80-89 MM HG: CPT | Performed by: INTERNAL MEDICINE

## 2025-02-24 PROCEDURE — 99214 OFFICE O/P EST MOD 30 MIN: CPT | Performed by: INTERNAL MEDICINE

## 2025-02-24 PROCEDURE — 3075F SYST BP GE 130 - 139MM HG: CPT | Performed by: INTERNAL MEDICINE

## 2025-02-24 PROCEDURE — 1123F ACP DISCUSS/DSCN MKR DOCD: CPT | Performed by: INTERNAL MEDICINE

## 2025-02-24 RX ORDER — PALONOSETRON 0.05 MG/ML
0.25 INJECTION, SOLUTION INTRAVENOUS ONCE
OUTPATIENT
Start: 2025-03-17 | End: 2025-03-17

## 2025-02-24 RX ORDER — DIPHENHYDRAMINE HYDROCHLORIDE 50 MG/ML
50 INJECTION INTRAMUSCULAR; INTRAVENOUS
OUTPATIENT
Start: 2025-03-17

## 2025-02-24 RX ORDER — ONDANSETRON 2 MG/ML
8 INJECTION INTRAMUSCULAR; INTRAVENOUS
Status: DISCONTINUED | OUTPATIENT
Start: 2025-02-24 | End: 2025-02-25 | Stop reason: HOSPADM

## 2025-02-24 RX ORDER — SODIUM CHLORIDE 9 MG/ML
5-250 INJECTION, SOLUTION INTRAVENOUS PRN
OUTPATIENT
Start: 2025-03-17

## 2025-02-24 RX ORDER — IVERMECTIN 3 MG/1
TABLET ORAL ONCE
Status: ON HOLD | COMMUNITY

## 2025-02-24 RX ORDER — FAMOTIDINE 10 MG/ML
20 INJECTION, SOLUTION INTRAVENOUS
Status: DISCONTINUED | OUTPATIENT
Start: 2025-02-24 | End: 2025-02-25 | Stop reason: HOSPADM

## 2025-02-24 RX ORDER — DOXORUBICIN HYDROCHLORIDE 2 MG/ML
50 INJECTION, SOLUTION INTRAVENOUS ONCE
Status: COMPLETED | OUTPATIENT
Start: 2025-02-24 | End: 2025-02-24

## 2025-02-24 RX ORDER — MEPERIDINE HYDROCHLORIDE 50 MG/ML
12.5 INJECTION INTRAMUSCULAR; INTRAVENOUS; SUBCUTANEOUS PRN
OUTPATIENT
Start: 2025-03-17

## 2025-02-24 RX ORDER — SODIUM CHLORIDE 9 MG/ML
5-250 INJECTION, SOLUTION INTRAVENOUS PRN
Status: DISCONTINUED | OUTPATIENT
Start: 2025-02-24 | End: 2025-02-25 | Stop reason: HOSPADM

## 2025-02-24 RX ORDER — DOXORUBICIN HYDROCHLORIDE 2 MG/ML
50 INJECTION, SOLUTION INTRAVENOUS ONCE
OUTPATIENT
Start: 2025-03-17 | End: 2025-03-17

## 2025-02-24 RX ORDER — HYDROCORTISONE SODIUM SUCCINATE 100 MG/2ML
100 INJECTION INTRAMUSCULAR; INTRAVENOUS
Status: COMPLETED | OUTPATIENT
Start: 2025-02-24 | End: 2025-02-24

## 2025-02-24 RX ORDER — SODIUM CHLORIDE 0.9 % (FLUSH) 0.9 %
5-40 SYRINGE (ML) INJECTION PRN
Status: DISCONTINUED | OUTPATIENT
Start: 2025-02-24 | End: 2025-02-25 | Stop reason: HOSPADM

## 2025-02-24 RX ORDER — HEPARIN SODIUM (PORCINE) LOCK FLUSH IV SOLN 100 UNIT/ML 100 UNIT/ML
500 SOLUTION INTRAVENOUS PRN
OUTPATIENT
Start: 2025-03-17

## 2025-02-24 RX ORDER — PROCHLORPERAZINE EDISYLATE 5 MG/ML
5 INJECTION INTRAMUSCULAR; INTRAVENOUS
OUTPATIENT
Start: 2025-03-17

## 2025-02-24 RX ORDER — FAMOTIDINE 10 MG/ML
20 INJECTION, SOLUTION INTRAVENOUS
OUTPATIENT
Start: 2025-03-17

## 2025-02-24 RX ORDER — DIPHENHYDRAMINE HYDROCHLORIDE 50 MG/ML
50 INJECTION INTRAMUSCULAR; INTRAVENOUS ONCE
OUTPATIENT
Start: 2025-03-17 | End: 2025-03-17

## 2025-02-24 RX ORDER — HEPARIN 100 UNIT/ML
500 SYRINGE INTRAVENOUS PRN
Status: DISCONTINUED | OUTPATIENT
Start: 2025-02-24 | End: 2025-02-25 | Stop reason: HOSPADM

## 2025-02-24 RX ORDER — IBUPROFEN 200 MG
200 TABLET ORAL EVERY 6 HOURS PRN
Status: ON HOLD | COMMUNITY

## 2025-02-24 RX ORDER — MEPERIDINE HYDROCHLORIDE 50 MG/ML
12.5 INJECTION INTRAMUSCULAR; INTRAVENOUS; SUBCUTANEOUS PRN
Status: COMPLETED | OUTPATIENT
Start: 2025-02-24 | End: 2025-02-24

## 2025-02-24 RX ORDER — HYDROCORTISONE SODIUM SUCCINATE 100 MG/2ML
100 INJECTION INTRAMUSCULAR; INTRAVENOUS
OUTPATIENT
Start: 2025-03-17

## 2025-02-24 RX ORDER — PALONOSETRON 0.05 MG/ML
0.25 INJECTION, SOLUTION INTRAVENOUS ONCE
Status: COMPLETED | OUTPATIENT
Start: 2025-02-24 | End: 2025-02-24

## 2025-02-24 RX ORDER — ALBUTEROL SULFATE 90 UG/1
4 INHALANT RESPIRATORY (INHALATION) PRN
OUTPATIENT
Start: 2025-03-17

## 2025-02-24 RX ORDER — ACETAMINOPHEN 325 MG/1
650 TABLET ORAL ONCE
OUTPATIENT
Start: 2025-03-17 | End: 2025-03-17

## 2025-02-24 RX ORDER — ONDANSETRON 2 MG/ML
8 INJECTION INTRAMUSCULAR; INTRAVENOUS
OUTPATIENT
Start: 2025-03-17

## 2025-02-24 RX ORDER — SODIUM CHLORIDE 9 MG/ML
INJECTION, SOLUTION INTRAVENOUS CONTINUOUS
Status: DISCONTINUED | OUTPATIENT
Start: 2025-02-24 | End: 2025-02-25 | Stop reason: HOSPADM

## 2025-02-24 RX ORDER — DEXAMETHASONE SODIUM PHOSPHATE 10 MG/ML
10 INJECTION, SOLUTION INTRA-ARTICULAR; INTRALESIONAL; INTRAMUSCULAR; INTRAVENOUS; SOFT TISSUE
Status: DISCONTINUED | OUTPATIENT
Start: 2025-02-24 | End: 2025-02-25 | Stop reason: HOSPADM

## 2025-02-24 RX ORDER — EPINEPHRINE 1 MG/ML
0.3 INJECTION, SOLUTION, CONCENTRATE INTRAVENOUS PRN
OUTPATIENT
Start: 2025-03-17

## 2025-02-24 RX ORDER — DIPHENHYDRAMINE HYDROCHLORIDE 50 MG/ML
50 INJECTION INTRAMUSCULAR; INTRAVENOUS ONCE
Status: COMPLETED | OUTPATIENT
Start: 2025-02-24 | End: 2025-02-24

## 2025-02-24 RX ORDER — ACETAMINOPHEN 325 MG/1
650 TABLET ORAL ONCE
Status: COMPLETED | OUTPATIENT
Start: 2025-02-24 | End: 2025-02-24

## 2025-02-24 RX ORDER — SODIUM CHLORIDE 9 MG/ML
INJECTION, SOLUTION INTRAVENOUS CONTINUOUS
OUTPATIENT
Start: 2025-03-17

## 2025-02-24 RX ORDER — SODIUM CHLORIDE 0.9 % (FLUSH) 0.9 %
5-40 SYRINGE (ML) INJECTION PRN
OUTPATIENT
Start: 2025-03-17

## 2025-02-24 RX ORDER — ACETAMINOPHEN 325 MG/1
650 TABLET ORAL
OUTPATIENT
Start: 2025-03-17

## 2025-02-24 RX ADMIN — SODIUM CHLORIDE 700 MG: 0.9 INJECTION, SOLUTION INTRAVENOUS at 09:52

## 2025-02-24 RX ADMIN — SODIUM CHLORIDE, PRESERVATIVE FREE 10 ML: 5 INJECTION INTRAVENOUS at 16:46

## 2025-02-24 RX ADMIN — DOXORUBICIN HYDROCHLORIDE 88 MG: 2 INJECTION, SOLUTION INTRAVENOUS at 15:49

## 2025-02-24 RX ADMIN — SODIUM CHLORIDE 100 ML/HR: 0.9 INJECTION, SOLUTION INTRAVENOUS at 09:18

## 2025-02-24 RX ADMIN — SODIUM CHLORIDE 150 MG: 0.9 INJECTION, SOLUTION INTRAVENOUS at 15:18

## 2025-02-24 RX ADMIN — SODIUM CHLORIDE, PRESERVATIVE FREE 10 ML: 5 INJECTION INTRAVENOUS at 08:06

## 2025-02-24 RX ADMIN — VINCRISTINE SULFATE 2 MG: 1 INJECTION, SOLUTION INTRAVENOUS at 15:56

## 2025-02-24 RX ADMIN — ACETAMINOPHEN 650 MG: 325 TABLET ORAL at 09:18

## 2025-02-24 RX ADMIN — MEPERIDINE HYDROCHLORIDE 12.5 MG: 50 INJECTION INTRAMUSCULAR; INTRAVENOUS; SUBCUTANEOUS at 11:54

## 2025-02-24 RX ADMIN — DIPHENHYDRAMINE HYDROCHLORIDE 50 MG: 50 INJECTION INTRAMUSCULAR; INTRAVENOUS at 09:19

## 2025-02-24 RX ADMIN — HYDROCORTISONE SODIUM SUCCINATE 100 MG: 100 INJECTION, POWDER, FOR SOLUTION INTRAMUSCULAR; INTRAVENOUS at 11:32

## 2025-02-24 RX ADMIN — PALONOSETRON 0.25 MG: 0.05 INJECTION, SOLUTION INTRAVENOUS at 15:13

## 2025-02-24 RX ADMIN — CYCLOPHOSPHAMIDE 1320 MG: 1 INJECTION INTRAVENOUS at 16:08

## 2025-02-24 RX ADMIN — HEPARIN 500 UNITS: 100 SYRINGE at 16:46

## 2025-02-24 RX ADMIN — MEPERIDINE HYDROCHLORIDE 12.5 MG: 50 INJECTION INTRAMUSCULAR; INTRAVENOUS; SUBCUTANEOUS at 11:38

## 2025-02-24 NOTE — PROGRESS NOTES
Spiritual Health Outpatient Oncology/Hematology Progress Note: [facility]    Situation: Writer learned from RN that Patient was beginning treatment.     Assessment: Patient confirmed that she was beginning treatment today. Pt shared how she was coping. Pt acknowledged the impact of the recent loss of her spouse on her coping. Pt processed her thoughts, feelings, and needs related to the loss and to her diagnosis. Pt spoke of the resources from which she has drawn and how they are assisting her. Pt acknowledged how her grief has affected her. Pt affirmed that she has a strong support system. Pt has registered with [employer] and is looking forward to participating in their services.     Intervention: Writer introduced herself and her services. Writer inquired about Pt's coping and needs. Writer explored Pt's sources of support and strength. Writer offered empathy regarding Pt's loss of Spouse. Writer offered supportive presence and active listening. Writer affirmed Pt's strengths. Writer offered words of support and encouragement.     Outcome:  Pt thanked writer for the visit.    Plan: Spiritual Health Services are available for Patient by phone and/or in person.        02/24/25 1129   Encounter Summary   Encounter Overview/Reason Spiritual/Emotional Needs;Initial Encounter   Service Provided For Patient   Referral/Consult From [facility]   Support System Family members;Friends/neighbors   Last Encounter  02/24/25   Complexity of Encounter Moderate   Begin Time 1100   End Time  1110   Total Time Calculated 10 min   Spiritual/Emotional needs   Type Spiritual Support   Assessment/Intervention/Outcome   Assessment Powerlessness;Sad   Intervention Sustaining Presence/Ministry of presence;Active listening;Explored/Affirmed feelings, thoughts, concerns;Explored Coping Skills/Resources;Discussed illness injury and it’s impact;Discussed belief system/[employer] practices/aleks   Outcome Expressed feelings, needs,

## 2025-02-24 NOTE — PROGRESS NOTES
Pt here for C.1D.1. R-CHOP  Arrives ambulatory.  Denies any new complaints.  Labs drawn from port, results reviewed.  Pt was seen by Dr. Baeza, order rec'd to proceed with tx.  About an hour in a half into ruxience infusion patient began having chills and rigors. Ruxience infusion stopped, NS bolus and rescue medications given per protocol. Dr. Baeza notified and order received to re-challenge patient once symptoms improved. Patient tolerated remaining ruxience infusion without incident.  Chemotherapy completed without incident.  Pt d/c'd in stable condition.  Returns 2/25/2025 for C1D2 fulphila.

## 2025-02-25 ENCOUNTER — HOSPITAL ENCOUNTER (OUTPATIENT)
Dept: INFUSION THERAPY | Age: 70
Discharge: HOME | End: 2025-02-25
Payer: MEDICARE

## 2025-02-25 DIAGNOSIS — C83.34 DIFFUSE LARGE B-CELL LYMPHOMA OF LYMPH NODES OF AXILLA (HCC): Primary | ICD-10-CM

## 2025-02-25 PROCEDURE — 6360000002 HC RX W HCPCS: Performed by: INTERNAL MEDICINE

## 2025-02-25 PROCEDURE — 96372 THER/PROPH/DIAG INJ SC/IM: CPT

## 2025-02-25 RX ADMIN — PEGFILGRASTIM-JMDB 6 MG: 6 INJECTION SUBCUTANEOUS at 09:31

## 2025-02-26 ENCOUNTER — CLINICAL DOCUMENTATION (OUTPATIENT)
Dept: ONCOLOGY | Age: 70
End: 2025-02-26

## 2025-02-26 NOTE — PROGRESS NOTES
[facility] Nutrition Screen:    PG-SGA screening form reviewed. Score = 7. Pt reports no appetite/no interest in eating, early satiety, consuming less than normal amounts of normal food, and feeling not my normal self, but able to be up and about with fairly normal activities. RD referral/nutrition evaluation indicated for scores > 4. RD assessment to follow.

## 2025-02-28 NOTE — PROGRESS NOTES
02/24/2025       Chemistry        Component Value Date/Time     02/24/2025 0813    K 3.8 02/24/2025 0813     02/24/2025 0813    CO2 25 02/24/2025 0813    BUN 13 02/24/2025 0813    CREATININE 0.8 02/24/2025 0813        Component Value Date/Time    CALCIUM 9.3 02/24/2025 0813    ALKPHOS 71 02/24/2025 0813    AST 32 (H) 02/24/2025 0813    ALT 15 02/24/2025 0813    BILITOT 0.8 02/24/2025 0813          @[facility]@      IMPRESSION:   High grade Diffuse large B-cell NHL. Negative for MYC rearrangement.   Extensive right axillary lymphadenopathy.      PLAN: Records, labs and images were reviewed and discussed with the patient. I explained to the patient the nature of this problem with axillary lymphadenopathy and possible underlying cause and management plan.    Explained the results of the lymph node biopsy.  Explained the nature of this lymphoma and management plan.  NCCN guidelines reviewed.  Considering the nature of this NHL and patient's age and co morbidities, we will give R-CHOP. I explained the benefits and possible side effects related to chemotherapy, which may include but not limited to nausea, vomiting, hair loss, mouth sores, allergy, neuropathy, fever infection sepsis, anemia and thrombocytopenia.  PET/CT scan reviewed.   Echo reviewed.   Start R-CHOP . I explained the benefits and possible side effects related to chemotherapy, which may include but not limited to nausea, vomiting, hair loss, mouth sores, allergy, neuropathy, fever infection sepsis, anemia and thrombocytopenia.  Monitor toxicity and response.    Patient's questions were answered to the best of her satisfaction and she verbalized full understanding and agreement.                            Fniesse Perez MD                          [facility]                            This note is created with the assistance of a speech recognition program.  While intending to generate a document that actually reflects the content  17-Aug-2017 07:00

## 2025-03-02 ENCOUNTER — APPOINTMENT (OUTPATIENT)
Dept: GENERAL RADIOLOGY | Age: 70
End: 2025-03-02
Payer: MEDICARE

## 2025-03-02 ENCOUNTER — HOSPITAL ENCOUNTER (INPATIENT)
Age: 70
LOS: 3 days | Discharge: HOME | End: 2025-03-05
Attending: EMERGENCY MEDICINE | Admitting: HOSPITALIST
Payer: MEDICARE

## 2025-03-02 DIAGNOSIS — D70.9 NEUTROPENIC FEVER: Primary | ICD-10-CM

## 2025-03-02 DIAGNOSIS — R50.81 NEUTROPENIC FEVER: Primary | ICD-10-CM

## 2025-03-02 LAB
ALBUMIN SERPL-MCNC: 3.4 G/DL (ref 3.5–5.2)
ALBUMIN/GLOB SERPL: 1.2 {RATIO} (ref 1–2.5)
ALP SERPL-CCNC: 85 U/L (ref 35–104)
ALT SERPL-CCNC: 16 U/L (ref 5–33)
ANION GAP SERPL CALCULATED.3IONS-SCNC: 13 MMOL/L (ref 9–17)
AST SERPL-CCNC: 23 U/L
BACTERIA URNS QL MICRO: ABNORMAL
BASOPHILS # BLD: 0 K/UL (ref 0–0.2)
BASOPHILS NFR BLD: 0 % (ref 0–2)
BILIRUB SERPL-MCNC: 0.9 MG/DL (ref 0.3–1.2)
BILIRUB UR QL STRIP: NEGATIVE
BUN SERPL-MCNC: 21 MG/DL (ref 8–23)
CALCIUM SERPL-MCNC: 8.5 MG/DL (ref 8.6–10.4)
CELLS COUNTED: 50
CHARACTER UR: ABNORMAL
CHLORIDE SERPL-SCNC: 101 MMOL/L (ref 98–107)
CLARITY UR: CLEAR
CO2 SERPL-SCNC: 25 MMOL/L (ref 20–31)
COLOR UR: YELLOW
CREAT SERPL-MCNC: 0.5 MG/DL (ref 0.5–0.9)
EKG ATRIAL RATE: 85 BPM
EKG P AXIS: 31 DEGREES
EKG P-R INTERVAL: 130 MS
EKG Q-T INTERVAL: 376 MS
EKG QRS DURATION: 74 MS
EKG QTC CALCULATION (BAZETT): 447 MS
EKG R AXIS: 14 DEGREES
EKG T AXIS: 19 DEGREES
EKG VENTRICULAR RATE: 85 BPM
EOSINOPHIL # BLD: 0.01 K/UL (ref 0–0.4)
EOSINOPHILS RELATIVE PERCENT: 2 % (ref 1–4)
EPI CELLS #/AREA URNS HPF: ABNORMAL /HPF (ref 0–5)
ERYTHROCYTE [DISTWIDTH] IN BLOOD BY AUTOMATED COUNT: 13.4 % (ref 12.5–15.4)
FLUAV AG SPEC QL: NEGATIVE
FLUBV AG SPEC QL: NEGATIVE
GFR, ESTIMATED: >90 ML/MIN/1.73M2
GLUCOSE SERPL-MCNC: 135 MG/DL (ref 70–99)
GLUCOSE UR STRIP-MCNC: NEGATIVE MG/DL
HCT VFR BLD AUTO: 34.2 % (ref 36–46)
HGB BLD-MCNC: 11.9 G/DL (ref 12–16)
HGB UR QL STRIP.AUTO: ABNORMAL
KETONES UR STRIP-MCNC: NEGATIVE MG/DL
LACTATE BLDV-SCNC: 1.9 MMOL/L (ref 0.5–2.2)
LEUKOCYTE ESTERASE UR QL STRIP: ABNORMAL
LYMPHOCYTES NFR BLD: 0.21 K/UL (ref 1–4.8)
LYMPHOCYTES RELATIVE PERCENT: 30 % (ref 24–44)
MCH RBC QN AUTO: 29.8 PG (ref 26–34)
MCHC RBC AUTO-ENTMCNC: 34.8 G/DL (ref 31–37)
MCV RBC AUTO: 85.6 FL (ref 80–100)
MONOCYTES NFR BLD: 0.03 K/UL (ref 0.1–0.8)
MONOCYTES NFR BLD: 4 % (ref 1–7)
MORPHOLOGY: NORMAL
NEUTROPHILS NFR BLD: 64 % (ref 36–66)
NEUTS SEG NFR BLD: 0.45 K/UL (ref 1.8–7.7)
NITRITE UR QL STRIP: POSITIVE
PH UR STRIP: 7 [PH] (ref 5–8)
PLATELET # BLD AUTO: 145 K/UL (ref 140–450)
PMV BLD AUTO: 7.8 FL (ref 6–12)
POTASSIUM SERPL-SCNC: 3.4 MMOL/L (ref 3.7–5.3)
PROT SERPL-MCNC: 6.2 G/DL (ref 6.4–8.3)
PROT UR STRIP-MCNC: NEGATIVE MG/DL
RBC # BLD AUTO: 4 M/UL (ref 4–5.2)
RBC #/AREA URNS HPF: ABNORMAL /HPF (ref 0–2)
SARS-COV-2 RDRP RESP QL NAA+PROBE: NOT DETECTED
SODIUM SERPL-SCNC: 139 MMOL/L (ref 135–144)
SP GR UR STRIP: 1.01 (ref 1–1.03)
SPECIMEN DESCRIPTION: NORMAL
TROPONIN I SERPL HS-MCNC: <6 NG/L (ref 0–14)
UROBILINOGEN UR STRIP-ACNC: NORMAL EU/DL (ref 0–1)
WBC #/AREA URNS HPF: ABNORMAL /HPF (ref 0–5)
WBC OTHER # BLD: 0.7 K/UL (ref 3.5–11)

## 2025-03-02 PROCEDURE — 84484 ASSAY OF TROPONIN QUANT: CPT

## 2025-03-02 PROCEDURE — 85025 COMPLETE CBC W/AUTO DIFF WBC: CPT

## 2025-03-02 PROCEDURE — 2500000003 HC RX 250 WO HCPCS: Performed by: HOSPITALIST

## 2025-03-02 PROCEDURE — 87088 URINE BACTERIA CULTURE: CPT

## 2025-03-02 PROCEDURE — 2060000000 HC ICU INTERMEDIATE R&B

## 2025-03-02 PROCEDURE — 6360000002 HC RX W HCPCS

## 2025-03-02 PROCEDURE — 87635 SARS-COV-2 COVID-19 AMP PRB: CPT

## 2025-03-02 PROCEDURE — 71045 X-RAY EXAM CHEST 1 VIEW: CPT

## 2025-03-02 PROCEDURE — 36415 COLL VENOUS BLD VENIPUNCTURE: CPT

## 2025-03-02 PROCEDURE — 87086 URINE CULTURE/COLONY COUNT: CPT

## 2025-03-02 PROCEDURE — 93005 ELECTROCARDIOGRAM TRACING: CPT | Performed by: EMERGENCY MEDICINE

## 2025-03-02 PROCEDURE — 6370000000 HC RX 637 (ALT 250 FOR IP): Performed by: HOSPITALIST

## 2025-03-02 PROCEDURE — 81001 URINALYSIS AUTO W/SCOPE: CPT

## 2025-03-02 PROCEDURE — 87804 INFLUENZA ASSAY W/OPTIC: CPT

## 2025-03-02 PROCEDURE — 96375 TX/PRO/DX INJ NEW DRUG ADDON: CPT

## 2025-03-02 PROCEDURE — 99222 1ST HOSP IP/OBS MODERATE 55: CPT | Performed by: HOSPITALIST

## 2025-03-02 PROCEDURE — 6360000002 HC RX W HCPCS: Performed by: HOSPITALIST

## 2025-03-02 PROCEDURE — 2580000003 HC RX 258

## 2025-03-02 PROCEDURE — 99285 EMERGENCY DEPT VISIT HI MDM: CPT

## 2025-03-02 PROCEDURE — 6370000000 HC RX 637 (ALT 250 FOR IP)

## 2025-03-02 PROCEDURE — 96374 THER/PROPH/DIAG INJ IV PUSH: CPT

## 2025-03-02 PROCEDURE — 93010 ELECTROCARDIOGRAM REPORT: CPT | Performed by: INTERNAL MEDICINE

## 2025-03-02 PROCEDURE — 6360000002 HC RX W HCPCS: Performed by: NURSE PRACTITIONER

## 2025-03-02 PROCEDURE — 83605 ASSAY OF LACTIC ACID: CPT

## 2025-03-02 PROCEDURE — 2580000003 HC RX 258: Performed by: HOSPITALIST

## 2025-03-02 PROCEDURE — 87186 SC STD MICRODIL/AGAR DIL: CPT

## 2025-03-02 PROCEDURE — 80053 COMPREHEN METABOLIC PANEL: CPT

## 2025-03-02 PROCEDURE — 87040 BLOOD CULTURE FOR BACTERIA: CPT

## 2025-03-02 RX ORDER — SODIUM CHLORIDE 0.9 % (FLUSH) 0.9 %
5-40 SYRINGE (ML) INJECTION PRN
Status: DISCONTINUED | OUTPATIENT
Start: 2025-03-02 | End: 2025-03-05 | Stop reason: HOSPADM

## 2025-03-02 RX ORDER — ACETAMINOPHEN 500 MG
1000 TABLET ORAL ONCE
Status: COMPLETED | OUTPATIENT
Start: 2025-03-02 | End: 2025-03-02

## 2025-03-02 RX ORDER — ACETAMINOPHEN 650 MG/1
650 SUPPOSITORY RECTAL EVERY 6 HOURS PRN
Status: DISCONTINUED | OUTPATIENT
Start: 2025-03-02 | End: 2025-03-05 | Stop reason: HOSPADM

## 2025-03-02 RX ORDER — ALLOPURINOL 300 MG/1
300 TABLET ORAL DAILY
Status: DISCONTINUED | OUTPATIENT
Start: 2025-03-02 | End: 2025-03-05 | Stop reason: HOSPADM

## 2025-03-02 RX ORDER — TRAMADOL HYDROCHLORIDE 50 MG/1
50 TABLET ORAL EVERY 4 HOURS PRN
Status: DISCONTINUED | OUTPATIENT
Start: 2025-03-02 | End: 2025-03-05

## 2025-03-02 RX ORDER — ONDANSETRON 2 MG/ML
4 INJECTION INTRAMUSCULAR; INTRAVENOUS EVERY 6 HOURS PRN
Status: DISCONTINUED | OUTPATIENT
Start: 2025-03-02 | End: 2025-03-05 | Stop reason: HOSPADM

## 2025-03-02 RX ORDER — POLYETHYLENE GLYCOL 3350 17 G/17G
17 POWDER, FOR SOLUTION ORAL DAILY PRN
Status: DISCONTINUED | OUTPATIENT
Start: 2025-03-02 | End: 2025-03-05 | Stop reason: HOSPADM

## 2025-03-02 RX ORDER — IBUPROFEN 200 MG
200 TABLET ORAL EVERY 6 HOURS PRN
Status: DISCONTINUED | OUTPATIENT
Start: 2025-03-02 | End: 2025-03-05 | Stop reason: HOSPADM

## 2025-03-02 RX ORDER — ERGOCALCIFEROL 1.25 MG/1
50000 CAPSULE, LIQUID FILLED ORAL WEEKLY
Status: DISCONTINUED | OUTPATIENT
Start: 2025-03-03 | End: 2025-03-05 | Stop reason: HOSPADM

## 2025-03-02 RX ORDER — ACETAMINOPHEN 325 MG/1
650 TABLET ORAL EVERY 6 HOURS PRN
Status: DISCONTINUED | OUTPATIENT
Start: 2025-03-02 | End: 2025-03-05 | Stop reason: HOSPADM

## 2025-03-02 RX ORDER — ONDANSETRON 2 MG/ML
4 INJECTION INTRAMUSCULAR; INTRAVENOUS ONCE
Status: COMPLETED | OUTPATIENT
Start: 2025-03-02 | End: 2025-03-02

## 2025-03-02 RX ORDER — SODIUM CHLORIDE 9 MG/ML
INJECTION, SOLUTION INTRAVENOUS PRN
Status: DISCONTINUED | OUTPATIENT
Start: 2025-03-02 | End: 2025-03-05 | Stop reason: HOSPADM

## 2025-03-02 RX ORDER — ENOXAPARIN SODIUM 100 MG/ML
40 INJECTION SUBCUTANEOUS DAILY
Status: DISCONTINUED | OUTPATIENT
Start: 2025-03-02 | End: 2025-03-05 | Stop reason: HOSPADM

## 2025-03-02 RX ORDER — SODIUM CHLORIDE 0.9 % (FLUSH) 0.9 %
5-40 SYRINGE (ML) INJECTION EVERY 12 HOURS SCHEDULED
Status: DISCONTINUED | OUTPATIENT
Start: 2025-03-02 | End: 2025-03-05 | Stop reason: HOSPADM

## 2025-03-02 RX ORDER — MORPHINE SULFATE 4 MG/ML
4 INJECTION, SOLUTION INTRAMUSCULAR; INTRAVENOUS ONCE
Status: COMPLETED | OUTPATIENT
Start: 2025-03-02 | End: 2025-03-02

## 2025-03-02 RX ORDER — KETOROLAC TROMETHAMINE 15 MG/ML
15 INJECTION, SOLUTION INTRAMUSCULAR; INTRAVENOUS ONCE
Status: COMPLETED | OUTPATIENT
Start: 2025-03-02 | End: 2025-03-02

## 2025-03-02 RX ADMIN — CEFEPIME 2000 MG: 2 INJECTION, POWDER, FOR SOLUTION INTRAVENOUS at 21:23

## 2025-03-02 RX ADMIN — TRAMADOL HYDROCHLORIDE 50 MG: 50 TABLET, COATED ORAL at 15:53

## 2025-03-02 RX ADMIN — MORPHINE SULFATE 4 MG: 4 INJECTION, SOLUTION INTRAMUSCULAR; INTRAVENOUS at 09:40

## 2025-03-02 RX ADMIN — ONDANSETRON 4 MG: 2 INJECTION INTRAMUSCULAR; INTRAVENOUS at 21:11

## 2025-03-02 RX ADMIN — KETOROLAC TROMETHAMINE 15 MG: 15 INJECTION, SOLUTION INTRAMUSCULAR; INTRAVENOUS at 13:45

## 2025-03-02 RX ADMIN — ONDANSETRON 4 MG: 2 INJECTION, SOLUTION INTRAMUSCULAR; INTRAVENOUS at 09:39

## 2025-03-02 RX ADMIN — TRAMADOL HYDROCHLORIDE 50 MG: 50 TABLET, COATED ORAL at 21:25

## 2025-03-02 RX ADMIN — CEFEPIME 2000 MG: 2 INJECTION, POWDER, FOR SOLUTION INTRAVENOUS at 12:03

## 2025-03-02 RX ADMIN — ACETAMINOPHEN 1000 MG: 500 TABLET ORAL at 13:45

## 2025-03-02 RX ADMIN — LANSOPRAZOLE 15 MG: 15 TABLET, ORALLY DISINTEGRATING ORAL at 13:45

## 2025-03-02 RX ADMIN — SODIUM CHLORIDE, PRESERVATIVE FREE 10 ML: 5 INJECTION INTRAVENOUS at 21:59

## 2025-03-02 ASSESSMENT — PAIN DESCRIPTION - ONSET
ONSET: ON-GOING
ONSET: ON-GOING

## 2025-03-02 ASSESSMENT — PAIN SCALES - GENERAL
PAINLEVEL_OUTOF10: 8
PAINLEVEL_OUTOF10: 6
PAINLEVEL_OUTOF10: 6

## 2025-03-02 ASSESSMENT — PAIN DESCRIPTION - ORIENTATION
ORIENTATION: RIGHT
ORIENTATION: RIGHT

## 2025-03-02 ASSESSMENT — PAIN DESCRIPTION - PAIN TYPE
TYPE: CHRONIC PAIN
TYPE: CHRONIC PAIN

## 2025-03-02 ASSESSMENT — PAIN DESCRIPTION - LOCATION
LOCATION: CHEST
LOCATION: OTHER (COMMENT)
LOCATION: OTHER (COMMENT)

## 2025-03-02 ASSESSMENT — PAIN DESCRIPTION - FREQUENCY
FREQUENCY: CONTINUOUS
FREQUENCY: CONTINUOUS

## 2025-03-02 ASSESSMENT — PAIN DESCRIPTION - DESCRIPTORS
DESCRIPTORS: SHARP
DESCRIPTORS: SHARP

## 2025-03-02 ASSESSMENT — PAIN - FUNCTIONAL ASSESSMENT
PAIN_FUNCTIONAL_ASSESSMENT: ACTIVITIES ARE NOT PREVENTED
PAIN_FUNCTIONAL_ASSESSMENT: 0-10
PAIN_FUNCTIONAL_ASSESSMENT: ACTIVITIES ARE NOT PREVENTED

## 2025-03-02 NOTE — ED PROVIDER NOTES
[facility] [place] EMERGENCY DEPARTMENT  EMERGENCY DEPARTMENT ENCOUNTER      Pt Name: Apurva Francis  MRN: [id number]  Birthdate 1955  Date of evaluation: 3/2/2025  Provider: DEISY Garcia CNP  1:01 PM    CHIEF COMPLAINT       Chief Complaint   Patient presents with    Chest Pain    Fatigue    Fever         HISTORY OF PRESENT ILLNESS    Apurva Francis is a 69 y.o. female who presents to the emergency department w cp, fever and fatigue     HPI  This is a 69-year-old female who was diagnosed with lymphoma in the lymph nodes of the right axillary.  Her last visit with oncology was the 26 she sees [facility], she does have a port in the left anterior chest and she had her first chemo injection on the 24th.  She states she initially felt fine however today she has severe right axillary pain that radiates up to her neck and jaw along with left chest pain body aches fever and fatigue.  She also has a sore throat she did say she felt like she was catching a cold earlier in the week but does not have cold symptoms at this time.  She states this was her first chemo injection.  She did take antipyretics prior to arrival    Nursing Notes were reviewed.    REVIEW OF SYSTEMS       Review of Systems   Constitutional:  Positive for fatigue and fever. Negative for activity change and chills.   HENT:  Negative for congestion, ear pain, rhinorrhea and sore throat.    Respiratory:  Negative for cough and shortness of breath.    Cardiovascular:  Positive for chest pain. Negative for leg swelling.   Gastrointestinal:  Negative for abdominal pain, diarrhea, nausea and vomiting.   Genitourinary:  Negative for difficulty urinating and pelvic pain.   Musculoskeletal:  Positive for arthralgias and myalgias. Negative for joint swelling.   Skin:  Negative for color change.   Neurological:  Negative for dizziness, numbness and headaches.       Except as noted above the remainder of the review of systems was reviewed and negative.  2 days     Minutes of Exercise per Session: 20 min   Housing Stability: Low Risk  (2/14/2025)    Housing Stability Vital Sign     Unable to Pay for Housing in the Last Year: No     Number of Times Moved in the Last Year: 0     Homeless in the Last Year: No       SCREENINGS                         Sharath Coma Scale  Eye Opening: Spontaneous  Best Verbal Response: Oriented  Best Motor Response: Obeys commands  [place] Coma Scale Score: 15                     CIWA Assessment  BP: 126/76  Pulse: 96                 PHYSICAL EXAM       ED Triage Vitals [03/02/25 0920]   BP Systolic BP Percentile Diastolic BP Percentile Temp Temp Source Pulse Respirations SpO2   (!) 157/85 -- -- 98 °F (36.7 °C) Oral 88 12 97 %      Height Weight - Scale         1.676 m (5' 6\") 67.6 kg (149 lb)             Physical Exam  Constitutional:       General: She is not in acute distress.     Appearance: She is not ill-appearing.   HENT:      Head: Normocephalic and atraumatic.      Right Ear: External ear normal.      Left Ear: External ear normal.      Mouth/Throat:      Mouth: Mucous membranes are moist.      Pharynx: Posterior oropharyngeal erythema present.   Eyes:      Pupils: Pupils are equal, round, and reactive to light.   Cardiovascular:      Rate and Rhythm: Normal rate and regular rhythm.   Pulmonary:      Effort: Pulmonary effort is normal.      Breath sounds: Normal breath sounds.   Chest:      Comments: Port left anterior chest without erythema swelling or tenderness  Abdominal:      General: Bowel sounds are normal.      Palpations: Abdomen is soft.      Tenderness: There is no abdominal tenderness.   Musculoskeletal:      Cervical back: Neck supple.   Lymphadenopathy:      Upper Body:      Right upper body: Axillary adenopathy present.   Skin:     General: Skin is warm and dry.   Neurological:      General: No focal deficit present.      Mental Status: She is alert and oriented to person, place, and time.         DIAGNOSTIC

## 2025-03-02 NOTE — H&P
[facility]  Office: [phone]  David Haynes DO, Krunal Blackwell DO, Hunter Yan DO, Gio Giles DO, Lo Gerard MD, Dalila Woodard MD, Amanda De Guzman MD, Jayleen Moreno MD,  Pritesh Ernst MD, Blanquita Andujar MD, Princess Mehta MD,  Agapito Recio DO, Mingo Rubio MD, Keith Polanco MD, Roger Haynes DO, Vandana Swanson MD,  Weston Elizabeth DO, Mary Reyes MD, Rolanda Wayne MD, Rohini Esquivel MD, Sebastian Covington MD,  Benton Brown MD, Adina Rice MD, Martina Wagner MD, Arjun Anderson MD, Orion Verdugo MD, Chey Martins MD, Kenneth Hilario DO, Adán Mendoza MD, Agapito Means MD, Mohsin Reza, MD, Shirley Waterhouse, CNP,  Priya Peres CNP, Kenneth Walker, CNP,  Apurva Rod, KOSTAS, Gisela Love, CNP, Swetha No, CNP, Rosemary Wood, CNP, Yashira Corbin CNP, Ashley Jaimes PA-LILIAN, Charmaine Miller, CNP, Patricia Franco, CNP,  Megha Man, CNP, Marlyn Onofre, CNP, Ashly Skelton, CNP,  Mari Dewitt, CNS, Annemarie Gardner CNP, Tamika Grimes CNP,   Maribel Waters, CNP         [facility]   IN-PATIENT SERVICE   [facility]    HISTORY AND PHYSICAL EXAMINATION            Date:   3/2/2025  Patient name:  Apurva Francis  Date of admission:  3/2/2025  9:09 AM  MRN:   [id number]  Account:  [id number]  YOB: 1955  PCP:    Mandi Orozco APRN - CNP  Room:   ER11/ER11  Code Status:    Full Code      History Obtained From:     patient, electronic medical record    History of Present Illness:     Apurva Francis is a 69 y.o. Non- / non  female who presents with Chest Pain, Fatigue, and Fever   and is admitted to the hospital for the management of Neutropenic fever.    This very pleasant 69-year-old female presents the hospital with chest pain, fever, fatigue.  The patient is undergoing treatment for B cell non-Hodgkin's lymphoma.  The patient underwent her first round of chemotherapy and received a dose of fulphila.  She did develop  inpatient status because of co-morbidities listed above, severity of signs and symptoms as outlined, requirement for current medical therapies and most importantly because of direct risk to patient if care not provided in a hospital setting.  Expected length of stay > 48 hours. Patient is admitted in the Progressive Unit/Step down    Medical Decision Making: Fabian Haynes DO  3/2/2025  2:57 PM    Copy sent to Mandi Newman, DEISY - CNP

## 2025-03-02 NOTE — ED PROVIDER NOTES
126/76, Temp: 98 °F (36.7 °C), Pulse: 96, Respirations: 12             PERTINENT ATTENDING PHYSICIAN COMMENTS:    I performed a history and physical examination of the patient and discussed management with the mid level provider. I reviewed the mid level provider's note and agree with the documented findings and plan of care. Any areas of disagreement are noted on the chart. I was personally present for the key portions of any procedures. I have documented in the chart those procedures where I was not present during the key portions. I have reviewed the emergency nurses triage note. I agree with the chief complaint, past medical history, past surgical history, allergies, medications, social and family history as documented unless otherwise noted below. Documentation of the HPI, Physical Exam and Medical Decision Making performed by mid level providers is based on my personal performance of the HPI, PE and MDM. For Residents, Physician Assistant/ Nurse Practitioner cases/documentation I have personally evaluated this patient and have completed at least one if not all key elements of the E/M (history, physical exam, and MDM). Additional findings are as noted.    Twelve lead EKG interpreted by myself:  A 12 lead EKG done at 0915, interpreted by myself, showed a regular rhythm at a rate of 85bpm.  The OK interval was normal.  The QRS complex was normal.  There was no ST segment elevation or depression, T wave inversion not.  QRS progression through precordial leads was grossly normal.  Interpretation: Normal sinus rhythm, no ST segment changes, no pattern consistent with acute ischemia or infarct      (Please note that portions of this note were completed with a voice recognition program.  Efforts were made to edit the dictations but occasionally words are mis-transcribed.)    José Miguel Hutchins DO  Attending Emergency Medicine Physician        José Miguel Hutchins DO  03/02/25 3483

## 2025-03-02 NOTE — ED NOTES
ED to inpatient nurses report      Chief Complaint:  Chief Complaint   Patient presents with    Chest Pain    Fatigue    Fever     Present to ED from: home    MOA:     LOC: alert and orientated to name, place, date  Mobility: Independent  Oxygen Baseline: none    Current needs required: none   Pending ED orders: none  Present condition: stable    Why did the patient come to the ED? Cp, fatigue, fever  What is the plan? admission  Any procedures or intervention occur? none  Any safety concerns?? Fall risk  CODE STATUS Full Code  Diet ADULT DIET; Regular; Low Microbial    Mental Status:  Level of Consciousness: Alert (0)    Psych Assessment:   Psychosocial  Psychosocial (WDL): Within Defined Limits  Vital signs   Vitals:    03/02/25 0920 03/02/25 0944 03/02/25 1546 03/02/25 1553   BP: (!) 157/85 126/76     Pulse: 88 96     Resp: 12   20   Temp: 98 °F (36.7 °C)  98.4 °F (36.9 °C)    TempSrc: Oral  Oral    SpO2: 97% 97%     Weight: 67.6 kg (149 lb)      Height: 1.676 m (5' 6\")           Vitals:  Patient Vitals for the past 24 hrs:   BP Temp Temp src Pulse Resp SpO2 Height Weight   03/02/25 1553 -- -- -- -- 20 -- -- --   03/02/25 1546 -- 98.4 °F (36.9 °C) Oral -- -- -- -- --   03/02/25 0944 126/76 -- -- 96 -- 97 % -- --   03/02/25 0920 (!) 157/85 98 °F (36.7 °C) Oral 88 12 97 % 1.676 m (5' 6\") 67.6 kg (149 lb)      Visit Vitals  /76   Pulse 96   Temp 98.4 °F (36.9 °C) (Oral)   Resp 20   Ht 1.676 m (5' 6\")   Wt 67.6 kg (149 lb)   SpO2 97%   BMI 24.05 kg/m²        LDAs:   Peripheral IV 03/02/25 Left Antecubital (Active)   Site Assessment Clean, dry & intact 03/02/25 0928   Line Status Blood return noted 03/02/25 0928   Phlebitis Assessment No symptoms 03/02/25 0928   Infiltration Assessment 0 03/02/25 0928   Dressing Status Clean, dry & intact 03/02/25 0928       Meds:  Medications   allopurinol (ZYLOPRIM) tablet 300 mg (0 mg Oral Held 3/2/25 1431)   ibuprofen (ADVIL;MOTRIN) tablet 200 mg (has no administration in

## 2025-03-03 LAB
ANION GAP SERPL CALCULATED.3IONS-SCNC: 8 MMOL/L (ref 9–17)
BASOPHILS # BLD: ABNORMAL K/UL (ref 0–0.2)
BASOPHILS NFR BLD: ABNORMAL %
BUN SERPL-MCNC: 17 MG/DL (ref 8–23)
CALCIUM SERPL-MCNC: 8.7 MG/DL (ref 8.6–10.4)
CHLORIDE SERPL-SCNC: 102 MMOL/L (ref 98–107)
CO2 SERPL-SCNC: 27 MMOL/L (ref 20–31)
CREAT SERPL-MCNC: 0.6 MG/DL (ref 0.5–0.9)
EOSINOPHIL # BLD: ABNORMAL K/UL
EOSINOPHILS RELATIVE PERCENT: ABNORMAL %
ERYTHROCYTE [DISTWIDTH] IN BLOOD BY AUTOMATED COUNT: 13.2 % (ref 12.5–15.4)
GFR, ESTIMATED: >90 ML/MIN/1.73M2
GLUCOSE SERPL-MCNC: 131 MG/DL (ref 70–99)
HCT VFR BLD AUTO: 31.6 % (ref 36–46)
HGB BLD-MCNC: 10.8 G/DL (ref 12–16)
LYMPHOCYTES NFR BLD: ABNORMAL K/UL
LYMPHOCYTES RELATIVE PERCENT: ABNORMAL %
MAGNESIUM SERPL-MCNC: 1.7 MG/DL (ref 1.6–2.6)
MCH RBC QN AUTO: 29.3 PG (ref 26–34)
MCHC RBC AUTO-ENTMCNC: 34.3 G/DL (ref 31–37)
MCV RBC AUTO: 85.6 FL (ref 80–100)
MONOCYTES NFR BLD: ABNORMAL %
MONOCYTES NFR BLD: ABNORMAL K/UL
NEUTROPHILS NFR BLD: ABNORMAL %
NEUTS SEG NFR BLD: ABNORMAL K/UL
PLATELET # BLD AUTO: 114 K/UL (ref 140–450)
PMV BLD AUTO: 7.4 FL (ref 6–12)
POTASSIUM SERPL-SCNC: 3.4 MMOL/L (ref 3.7–5.3)
RBC # BLD AUTO: 3.69 M/UL (ref 4–5.2)
SODIUM SERPL-SCNC: 137 MMOL/L (ref 135–144)
WBC OTHER # BLD: 0.3 K/UL (ref 3.5–11)

## 2025-03-03 PROCEDURE — 6370000000 HC RX 637 (ALT 250 FOR IP): Performed by: HOSPITALIST

## 2025-03-03 PROCEDURE — 2060000000 HC ICU INTERMEDIATE R&B

## 2025-03-03 PROCEDURE — 85027 COMPLETE CBC AUTOMATED: CPT

## 2025-03-03 PROCEDURE — 6360000002 HC RX W HCPCS: Performed by: NURSE PRACTITIONER

## 2025-03-03 PROCEDURE — 2580000003 HC RX 258: Performed by: HOSPITALIST

## 2025-03-03 PROCEDURE — 99232 SBSQ HOSP IP/OBS MODERATE 35: CPT | Performed by: HOSPITALIST

## 2025-03-03 PROCEDURE — 6360000002 HC RX W HCPCS: Performed by: HOSPITALIST

## 2025-03-03 PROCEDURE — 80048 BASIC METABOLIC PNL TOTAL CA: CPT

## 2025-03-03 PROCEDURE — 99223 1ST HOSP IP/OBS HIGH 75: CPT | Performed by: INTERNAL MEDICINE

## 2025-03-03 PROCEDURE — 83735 ASSAY OF MAGNESIUM: CPT

## 2025-03-03 PROCEDURE — 2500000003 HC RX 250 WO HCPCS: Performed by: HOSPITALIST

## 2025-03-03 PROCEDURE — 85025 COMPLETE CBC W/AUTO DIFF WBC: CPT

## 2025-03-03 PROCEDURE — 36415 COLL VENOUS BLD VENIPUNCTURE: CPT

## 2025-03-03 RX ORDER — POTASSIUM CHLORIDE 7.45 MG/ML
10 INJECTION INTRAVENOUS PRN
Status: DISCONTINUED | OUTPATIENT
Start: 2025-03-03 | End: 2025-03-05 | Stop reason: HOSPADM

## 2025-03-03 RX ORDER — POTASSIUM CHLORIDE 1500 MG/1
40 TABLET, EXTENDED RELEASE ORAL PRN
Status: DISCONTINUED | OUTPATIENT
Start: 2025-03-03 | End: 2025-03-05 | Stop reason: HOSPADM

## 2025-03-03 RX ORDER — HEPARIN 100 UNIT/ML
300 SYRINGE INTRAVENOUS PRN
Status: DISCONTINUED | OUTPATIENT
Start: 2025-03-03 | End: 2025-03-05 | Stop reason: HOSPADM

## 2025-03-03 RX ORDER — LABETALOL HYDROCHLORIDE 5 MG/ML
10 INJECTION, SOLUTION INTRAVENOUS ONCE
Status: COMPLETED | OUTPATIENT
Start: 2025-03-03 | End: 2025-03-03

## 2025-03-03 RX ORDER — MAGNESIUM SULFATE IN WATER 40 MG/ML
2000 INJECTION, SOLUTION INTRAVENOUS PRN
Status: DISCONTINUED | OUTPATIENT
Start: 2025-03-03 | End: 2025-03-05 | Stop reason: HOSPADM

## 2025-03-03 RX ADMIN — POTASSIUM CHLORIDE 40 MEQ: 1500 TABLET, EXTENDED RELEASE ORAL at 10:26

## 2025-03-03 RX ADMIN — LANSOPRAZOLE 15 MG: 15 TABLET, ORALLY DISINTEGRATING ORAL at 08:21

## 2025-03-03 RX ADMIN — SODIUM CHLORIDE, PRESERVATIVE FREE 10 ML: 5 INJECTION INTRAVENOUS at 21:22

## 2025-03-03 RX ADMIN — ENOXAPARIN SODIUM 40 MG: 100 INJECTION SUBCUTANEOUS at 08:21

## 2025-03-03 RX ADMIN — CEFEPIME 2000 MG: 2 INJECTION, POWDER, FOR SOLUTION INTRAVENOUS at 05:12

## 2025-03-03 RX ADMIN — ALLOPURINOL 300 MG: 300 TABLET ORAL at 08:21

## 2025-03-03 RX ADMIN — ERGOCALCIFEROL 50000 UNITS: 1.25 CAPSULE ORAL at 08:21

## 2025-03-03 RX ADMIN — TRAMADOL HYDROCHLORIDE 50 MG: 50 TABLET, COATED ORAL at 18:49

## 2025-03-03 RX ADMIN — CEFEPIME 2000 MG: 2 INJECTION, POWDER, FOR SOLUTION INTRAVENOUS at 21:26

## 2025-03-03 RX ADMIN — CEFEPIME 2000 MG: 2 INJECTION, POWDER, FOR SOLUTION INTRAVENOUS at 13:15

## 2025-03-03 RX ADMIN — HEPARIN 300 UNITS: 100 SYRINGE at 10:27

## 2025-03-03 RX ADMIN — TRAMADOL HYDROCHLORIDE 50 MG: 50 TABLET, COATED ORAL at 14:25

## 2025-03-03 RX ADMIN — LABETALOL HYDROCHLORIDE 10 MG: 5 INJECTION, SOLUTION INTRAVENOUS at 00:24

## 2025-03-03 RX ADMIN — ACETAMINOPHEN 650 MG: 325 TABLET ORAL at 16:11

## 2025-03-03 RX ADMIN — TRAMADOL HYDROCHLORIDE 50 MG: 50 TABLET, COATED ORAL at 08:21

## 2025-03-03 ASSESSMENT — PAIN SCALES - GENERAL
PAINLEVEL_OUTOF10: 7
PAINLEVEL_OUTOF10: 0
PAINLEVEL_OUTOF10: 7
PAINLEVEL_OUTOF10: 8
PAINLEVEL_OUTOF10: 6
PAINLEVEL_OUTOF10: 7
PAINLEVEL_OUTOF10: 7
PAINLEVEL_OUTOF10: 8
PAINLEVEL_OUTOF10: 6
PAINLEVEL_OUTOF10: 8
PAINLEVEL_OUTOF10: 7

## 2025-03-03 ASSESSMENT — PAIN DESCRIPTION - LOCATION
LOCATION: OTHER (COMMENT)
LOCATION: JAW
LOCATION: OTHER (COMMENT)
LOCATION: HEAD

## 2025-03-03 NOTE — PLAN OF CARE
Problem: Chronic Conditions and Co-morbidities  Goal: Patient's chronic conditions and co-morbidity symptoms are monitored and maintained or improved  Outcome: Progressing  Flowsheets (Taken 3/2/2025 2101)  Care Plan - Patient's Chronic Conditions and Co-Morbidity Symptoms are Monitored and Maintained or Improved:   Monitor and assess patient's chronic conditions and comorbid symptoms for stability, deterioration, or improvement   Collaborate with multidisciplinary team to address chronic and comorbid conditions and prevent exacerbation or deterioration   Update acute care plan with appropriate goals if chronic or comorbid symptoms are exacerbated and prevent overall improvement and discharge     Problem: Discharge Planning  Goal: Discharge to home or other facility with appropriate resources  Outcome: Progressing  Flowsheets (Taken 3/2/2025 2101)  Discharge to home or other facility with appropriate resources:   Identify barriers to discharge with patient and caregiver   Arrange for needed discharge resources and transportation as appropriate   Identify discharge learning needs (meds, wound care, etc)   Refer to discharge planning if patient needs post-hospital services based on physician order or complex needs related to functional status, cognitive ability or social support system     Problem: Pain  Goal: Verbalizes/displays adequate comfort level or baseline comfort level  Outcome: Progressing     Problem: ABCDS Injury Assessment  Goal: Absence of physical injury  Outcome: Progressing

## 2025-03-03 NOTE — PLAN OF CARE
Problem: Chronic Conditions and Co-morbidities  Goal: Patient's chronic conditions and co-morbidity symptoms are monitored and maintained or improved  3/3/2025 1123 by Rosemary Camacho RN  Outcome: Progressing  Flowsheets (Taken 3/3/2025 0821)  Care Plan - Patient's Chronic Conditions and Co-Morbidity Symptoms are Monitored and Maintained or Improved:   Monitor and assess patient's chronic conditions and comorbid symptoms for stability, deterioration, or improvement   Collaborate with multidisciplinary team to address chronic and comorbid conditions and prevent exacerbation or deterioration   Update acute care plan with appropriate goals if chronic or comorbid symptoms are exacerbated and prevent overall improvement and discharge     Problem: Discharge Planning  Goal: Discharge to home or other facility with appropriate resources  3/3/2025 1123 by Rosemary Camacho RN  Outcome: Progressing  Flowsheets (Taken 3/3/2025 0821)  Discharge to home or other facility with appropriate resources:   Identify barriers to discharge with patient and caregiver   Arrange for needed discharge resources and transportation as appropriate   Identify discharge learning needs (meds, wound care, etc)   Refer to discharge planning if patient needs post-hospital services based on physician order or complex needs related to functional status, cognitive ability or social support system     Problem: Pain  Goal: Verbalizes/displays adequate comfort level or baseline comfort level  3/3/2025 1123 by Rosemary Camacho RN  Outcome: Progressing     Problem: ABCDS Injury Assessment  Goal: Absence of physical injury  3/3/2025 1123 by Rosemary Camacho, RN  Outcome: Progressing

## 2025-03-03 NOTE — PROGRESS NOTES
[facility]  Office: [phone]  David Haynes DO, Krunal Blackwell DO, Hunter Yan DO, Gio Giles DO, Lo Gerard MD, Dalila Woodard MD, Amanda De Guzman MD, Jayleen Moreno MD,  Pritesh Ernst MD, Blanquita Andujar MD, Princess Mehta MD,  Agapito Recio DO, Mingo Rubio MD, Keith Polanco MD, Roger Haynes DO, Vandana Swanson MD,  Weston Elizabeth DO, Mary Reyes MD, Rolanda Wayne MD, Rohini Esquivel MD, Sebastian Covington MD,  Benton Brown MD, Adina Rice MD, Martina Wagner MD, Arjun Anderson MD, Orion Verdugo MD, Chey Martins MD, Kenneth Hilario DO, Adán Mendoza MD, Agapito Means MD, Mohsin Reza, MD, Shirley Waterhouse, CNP,  Priya Peres CNP, Kenneth Walker, CNP,  Apurva Rod, KOSTAS, Gisela Love, CNP, Swetha No, CNP, Rosemary Wood, CNP, Yashira Corbin CNP, Ashley Jaimes PA-LILIAN, Charmaine Miller, CNP, Patricia Franco, CNP,  Megha Man, CNP, Marlyn Onofre, CNP, Ashly Skelton, CNP,  Mari Dewitt, CNS, Annemarie Gardner CNP, Tamika Grimes CNP,   Maribel Waters, CNP         [facility]   IN-PATIENT SERVICE   [facility]    Progress Note    3/3/2025    1:32 PM    Name:   Apurva Francis  MRN:     [id number]     Acct:      [id number]   Room:   [facility]/325-01   Day:  1  Admit Date:  3/2/2025  9:09 AM    PCP:   Mandi Orozco APRN - CNP  Code Status:  Full Code    Subjective:     Patient seen in follow-up for neutropenic fever/urinary tract infection.  Patient states \"I do not feel well\"    The patient continues to express that she does not feel particularly well.  She is maintained on cefepime for neutropenic fever and/or urinary tract infection.  The patient is tearful over the fact that she has developed complications after her first round of CHOP.  I did explain to her that drowned is different and that she needs to speak with her oncologist regarding this.  Regarding her pain associated with fulphila Ultram has been effective in controlling it.  As

## 2025-03-03 NOTE — CARE COORDINATION
Case Management Assessment  Initial Evaluation    Date/Time of Evaluation: 3/3/2025 11:13 AM  Assessment Completed by: Vianey Chery RN    If patient is discharged prior to next notation, then this note serves as note for discharge by case management.    Patient Name: Apurva Francis                   YOB: 1955  Diagnosis: Neutropenic fever [D70.9, R50.81]                   Date / Time: 3/2/2025  9:09 AM    Patient Admission Status: Inpatient   Readmission Risk (Low < 19, Mod (19-27), High > 27): Readmission Risk Score: 11.4    Current PCP: Mandi Orozco APRN - CNP  PCP verified by CM? (P) Yes    Chart Reviewed: Yes      History Provided by: (P) Patient  Patient Orientation: (P) Alert and Oriented    Patient Cognition: (P) Alert    Hospitalization in the last 30 days (Readmission):  No    If yes, Readmission Assessment in CM Navigator will be completed.    Advance Directives:      Code Status: Full Code   Patient's Primary Decision Maker is: (P) Legal Next of Kin    Primary Decision Maker: [place] - Child - [phone]    Discharge Planning:    Patient lives with: (P) Alone Type of Home: (P) House  Primary Care Giver: (P) Self  Patient Support Systems include: (P) Children, Friends/Neighbors   Current Financial resources: (P) Medicare  Current community resources: (P) None  Current services prior to admission: (P) None            Current DME:              Type of Home Care services:  (P) None    ADLS  Prior functional level: (P) Independent in ADLs/IADLs  Current functional level: (P) Independent in ADLs/IADLs    PT AM-PAC:   /24  OT AM-PAC:   /24    Family can provide assistance at DC: (P) No  Would you like Case Management to discuss the discharge plan with any other family members/significant others, and if so, who? (P) No  Plans to Return to Present Housing: (P) Yes  Other Identified Issues/Barriers to RETURNING to current housing: Clinical status  Potential Assistance needed at  discharge: (P) N/A            Potential DME:    Patient expects to discharge to: (P) House  Plan for transportation at discharge: (P) Family    Financial    Payor: [facility] MEDICARE / Plan: [facility] MEDICARE COMPLETE / Product Type: *No Product type* /     Does insurance require precert for SNF: Yes    Potential assistance Purchasing Medications: (P) No  Meds-to-Beds request:        [id number] - [place], [place] - [address] - P [phone] - F [phone]  [address]  [facility] [address]  Phone: [phone] Fax: [phone]    [facility] - [place], [place] - [address] [place] [address] [phone] - F [phone]  [address] [address]  Phone: [phone] Fax: [phone]    [place], [place] - [address] - P [phone] - F [phone]  [address]  [facility] [address]  Phone: [phone] Fax: [phone]      Notes:    Factors facilitating achievement of predicted outcomes: Cooperative and Pleasant    Barriers to discharge: Medical complications    Additional Case Management Notes: Patient is from home alone and does not use DME. Patient is independent and denies need from CM. Patient has transport home at discharge.     The Plan for Transition of Care is related to the following treatment goals of Neutropenic fever [D70.9, R50.81]    IF APPLICABLE: The Patient and/or patient representative Apurva and her family were provided with a choice of provider and agrees with the discharge plan. Freedom of choice list with basic dialogue that supports the patient's individualized plan of care/goals and shares the quality data associated with the providers was provided to: (P) Patient   Patient Representative Name:       The Patient and/or Patient Representative Agree with the Discharge Plan? (P) Yes    Vianey Chery RN  Case Management Department

## 2025-03-03 NOTE — CONSULTS
_                         Today's Date: 3/3/2025  Patient Name: Apurva Francis  Date of admission: 3/2/2025  9:09 AM  Patient's age: 69 y.o., 1955  Admission Dx: Neutropenic fever [D70.9, R50.81]      Requesting Physician: Roger Haynes DO    CHIEF COMPLAINT: Febrile neutropenia.  UTI.  Non-Hodgkin's lymphoma    History Obtained From:  patient, electronic medical record    HISTORY OF PRESENT ILLNESS:      The patient is a 69 y.o.  female who is admitted to the hospital for further management of febrile neutropenia after first treatment for non-Hodgkin's lymphoma.  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  She was started on treatment with R-CHOP on February 24, 2025.  She had Neulasta 24 hours after the chemotherapy.  She had generalized bodyaches but mainly in the jaw and chest wall secondary to Neulasta.  She started having fever about 4 days after the treatment.  No respiratory distress.  No sore throat.  No cough or sputum.  She has generalized weakness.  She presented to the emergency room and she had labs which showed severe neutropenia.  Further workup showed UTI.  Patient was admitted for management of febrile neutropenia..    Brief oncologic history:  DIAGNOSIS:        High grade Diffuse large B-cell NHL. Negative for MYC rearrangement.   Extensive right axillary lymphadenopathy.   CURRENT THERAPY:         Work up in progress  Start R-CHOP 2/24/25  BRIEF CASE HISTORY:      Ms. Apurva Francis is a very pleasant 69 y.o. female with history of multiple co morbidities as listed.  Patient is referred for evaluation and further management of axillary lymphadenopathy.  Patient states she felt a lump in the right axillary area October 2024.  Lump was consistent with a enlarging lymph node.  She had no trauma or skin infections.  Recently she had some discomfort and mild pain in the axillary lymph  chloride (KLOR-CON M) extended release tablet 40 mEq  40 mEq Oral PRN Roger Haynes, DO   40 mEq at 03/03/25 1026    Or    potassium bicarb-citric acid (EFFER-K) effervescent tablet 40 mEq  40 mEq Oral PRN Roger Haynes,         Or    potassium chloride 10 mEq/100 mL IVPB (Peripheral Line)  10 mEq IntraVENous PRN Roger Haynes, DO        magnesium sulfate 2000 mg in 50 mL IVPB premix  2,000 mg IntraVENous PRN Roger Haynes,         heparin (PF) 100 UNIT/ML injection 300 Units  300 Units IntraCATHeter PRN Roger Haynes, DO   300 Units at 03/03/25 1027    allopurinol (ZYLOPRIM) tablet 300 mg  300 mg Oral Daily Roger Haynes DO   300 mg at 03/03/25 0821    ibuprofen (ADVIL;MOTRIN) tablet 200 mg  200 mg Oral Q6H PRN Roger Haynes, DO        lansoprazole (PREVACID SOLUTAB) disintegrating tablet 15 mg  15 mg Oral Daily Roger Haynes, DO   15 mg at 03/03/25 0821    vitamin D (ERGOCALCIFEROL) capsule 50,000 Units  50,000 Units Oral Weekly Roger Haynes DO   50,000 Units at 03/03/25 0821    sodium chloride flush 0.9 % injection 5-40 mL  5-40 mL IntraVENous 2 times per day Roger Haynes DO   10 mL at 03/02/25 2159    sodium chloride flush 0.9 % injection 5-40 mL  5-40 mL IntraVENous PRN Roger Haynes,         0.9 % sodium chloride infusion   IntraVENous PRN Roger Haynes,         enoxaparin (LOVENOX) injection 40 mg  40 mg SubCUTAneous Daily Roger Haynes DO   40 mg at 03/03/25 0821    polyethylene glycol (GLYCOLAX) packet 17 g  17 g Oral Daily PRN Roger Haynes,         acetaminophen (TYLENOL) tablet 650 mg  650 mg Oral Q6H PRN Roger Haynes, DO   650 mg at 03/03/25 1611    Or    acetaminophen (TYLENOL) suppository 650 mg  650 mg Rectal Q6H PRN Roger Haynes,         ceFEPIme (MAXIPIME) 2,000 mg in sodium chloride 0.9 % 100 mL IVPB (addEASE)  2,000 mg IntraVENous Q8H Roger Haynes DO 25 mL/hr at 03/03/25

## 2025-03-04 ENCOUNTER — APPOINTMENT (OUTPATIENT)
Dept: GENERAL RADIOLOGY | Age: 70
End: 2025-03-04
Payer: MEDICARE

## 2025-03-04 PROBLEM — T45.1X5A PANCYTOPENIA DUE TO ANTINEOPLASTIC CHEMOTHERAPY: Status: ACTIVE | Noted: 2025-03-04

## 2025-03-04 PROBLEM — N30.00 ACUTE CYSTITIS WITHOUT HEMATURIA: Status: ACTIVE | Noted: 2025-03-04

## 2025-03-04 PROBLEM — D61.810 PANCYTOPENIA DUE TO ANTINEOPLASTIC CHEMOTHERAPY: Status: ACTIVE | Noted: 2025-03-04

## 2025-03-04 LAB
BASOPHILS NFR BLD: ABNORMAL % (ref 0–2)
EOSINOPHILS RELATIVE PERCENT: ABNORMAL % (ref 1–4)
ERYTHROCYTE [DISTWIDTH] IN BLOOD BY AUTOMATED COUNT: 13 % (ref 12.5–15.4)
HCT VFR BLD AUTO: 32.5 % (ref 36–46)
HGB BLD-MCNC: 11.1 G/DL (ref 12–16)
LYMPHOCYTES RELATIVE PERCENT: ABNORMAL % (ref 24–44)
MCH RBC QN AUTO: 29.2 PG (ref 26–34)
MCHC RBC AUTO-ENTMCNC: 34.1 G/DL (ref 31–37)
MCV RBC AUTO: 85.6 FL (ref 80–100)
MICROORGANISM SPEC CULT: ABNORMAL
MONOCYTES NFR BLD: ABNORMAL % (ref 2–11)
NEUTROPHILS NFR BLD: ABNORMAL % (ref 36–66)
PLATELET # BLD AUTO: 100 K/UL (ref 140–450)
PMV BLD AUTO: 8.1 FL (ref 6–12)
RBC # BLD AUTO: 3.8 M/UL (ref 4–5.2)
SPECIMEN DESCRIPTION: ABNORMAL
WBC OTHER # BLD: 0.4 K/UL (ref 3.5–11)

## 2025-03-04 PROCEDURE — 2580000003 HC RX 258: Performed by: HOSPITALIST

## 2025-03-04 PROCEDURE — 6370000000 HC RX 637 (ALT 250 FOR IP): Performed by: STUDENT IN AN ORGANIZED HEALTH CARE EDUCATION/TRAINING PROGRAM

## 2025-03-04 PROCEDURE — 6370000000 HC RX 637 (ALT 250 FOR IP): Performed by: HOSPITALIST

## 2025-03-04 PROCEDURE — 74018 RADEX ABDOMEN 1 VIEW: CPT

## 2025-03-04 PROCEDURE — 93005 ELECTROCARDIOGRAM TRACING: CPT | Performed by: STUDENT IN AN ORGANIZED HEALTH CARE EDUCATION/TRAINING PROGRAM

## 2025-03-04 PROCEDURE — 85027 COMPLETE CBC AUTOMATED: CPT

## 2025-03-04 PROCEDURE — 6360000002 HC RX W HCPCS: Performed by: STUDENT IN AN ORGANIZED HEALTH CARE EDUCATION/TRAINING PROGRAM

## 2025-03-04 PROCEDURE — 85025 COMPLETE CBC W/AUTO DIFF WBC: CPT

## 2025-03-04 PROCEDURE — 2500000003 HC RX 250 WO HCPCS: Performed by: STUDENT IN AN ORGANIZED HEALTH CARE EDUCATION/TRAINING PROGRAM

## 2025-03-04 PROCEDURE — 1210000000 HC MED SURG R&B

## 2025-03-04 PROCEDURE — 99232 SBSQ HOSP IP/OBS MODERATE 35: CPT | Performed by: INTERNAL MEDICINE

## 2025-03-04 PROCEDURE — 6360000002 HC RX W HCPCS: Performed by: HOSPITALIST

## 2025-03-04 PROCEDURE — 36415 COLL VENOUS BLD VENIPUNCTURE: CPT

## 2025-03-04 PROCEDURE — 2500000003 HC RX 250 WO HCPCS: Performed by: HOSPITALIST

## 2025-03-04 PROCEDURE — 99232 SBSQ HOSP IP/OBS MODERATE 35: CPT | Performed by: STUDENT IN AN ORGANIZED HEALTH CARE EDUCATION/TRAINING PROGRAM

## 2025-03-04 RX ORDER — POLYETHYLENE GLYCOL 3350 17 G/17G
17 POWDER, FOR SOLUTION ORAL 2 TIMES DAILY
Status: DISCONTINUED | OUTPATIENT
Start: 2025-03-04 | End: 2025-03-05 | Stop reason: HOSPADM

## 2025-03-04 RX ORDER — DOCUSATE SODIUM 100 MG/1
100 CAPSULE, LIQUID FILLED ORAL DAILY
Status: DISCONTINUED | OUTPATIENT
Start: 2025-03-04 | End: 2025-03-05 | Stop reason: HOSPADM

## 2025-03-04 RX ADMIN — WATER 1000 MG: 1 INJECTION INTRAMUSCULAR; INTRAVENOUS; SUBCUTANEOUS at 13:33

## 2025-03-04 RX ADMIN — DOCUSATE SODIUM 100 MG: 100 CAPSULE, LIQUID FILLED ORAL at 09:47

## 2025-03-04 RX ADMIN — SODIUM CHLORIDE, PRESERVATIVE FREE 10 ML: 5 INJECTION INTRAVENOUS at 20:40

## 2025-03-04 RX ADMIN — TRAMADOL HYDROCHLORIDE 50 MG: 50 TABLET, COATED ORAL at 06:40

## 2025-03-04 RX ADMIN — LANSOPRAZOLE 15 MG: 15 TABLET, ORALLY DISINTEGRATING ORAL at 08:10

## 2025-03-04 RX ADMIN — TRAMADOL HYDROCHLORIDE 50 MG: 50 TABLET, COATED ORAL at 16:28

## 2025-03-04 RX ADMIN — CEFEPIME 2000 MG: 2 INJECTION, POWDER, FOR SOLUTION INTRAVENOUS at 05:11

## 2025-03-04 RX ADMIN — SODIUM CHLORIDE, PRESERVATIVE FREE 10 ML: 5 INJECTION INTRAVENOUS at 09:47

## 2025-03-04 RX ADMIN — TRAMADOL HYDROCHLORIDE 50 MG: 50 TABLET, COATED ORAL at 20:30

## 2025-03-04 RX ADMIN — ALLOPURINOL 300 MG: 300 TABLET ORAL at 08:10

## 2025-03-04 RX ADMIN — POLYETHYLENE GLYCOL 3350 17 G: 17 POWDER, FOR SOLUTION ORAL at 20:29

## 2025-03-04 RX ADMIN — POLYETHYLENE GLYCOL 3350 17 G: 17 POWDER, FOR SOLUTION ORAL at 13:32

## 2025-03-04 ASSESSMENT — PAIN DESCRIPTION - PAIN TYPE: TYPE: CHRONIC PAIN

## 2025-03-04 ASSESSMENT — PAIN - FUNCTIONAL ASSESSMENT
PAIN_FUNCTIONAL_ASSESSMENT: ACTIVITIES ARE NOT PREVENTED

## 2025-03-04 ASSESSMENT — PAIN DESCRIPTION - LOCATION
LOCATION: JAW

## 2025-03-04 ASSESSMENT — PAIN SCALES - GENERAL
PAINLEVEL_OUTOF10: 7
PAINLEVEL_OUTOF10: 8
PAINLEVEL_OUTOF10: 3
PAINLEVEL_OUTOF10: 5
PAINLEVEL_OUTOF10: 6
PAINLEVEL_OUTOF10: 5
PAINLEVEL_OUTOF10: 5

## 2025-03-04 ASSESSMENT — PAIN DESCRIPTION - ONSET
ONSET: ON-GOING
ONSET: ON-GOING

## 2025-03-04 ASSESSMENT — PAIN DESCRIPTION - DESCRIPTORS
DESCRIPTORS: CRAMPING
DESCRIPTORS: DISCOMFORT
DESCRIPTORS: DISCOMFORT

## 2025-03-04 ASSESSMENT — PAIN DESCRIPTION - FREQUENCY
FREQUENCY: CONTINUOUS
FREQUENCY: CONTINUOUS

## 2025-03-04 NOTE — PLAN OF CARE
Problem: Chronic Conditions and Co-morbidities  Goal: Patient's chronic conditions and co-morbidity symptoms are monitored and maintained or improved  Outcome: Progressing  Flowsheets (Taken 3/3/2025 2000)  Care Plan - Patient's Chronic Conditions and Co-Morbidity Symptoms are Monitored and Maintained or Improved:   Monitor and assess patient's chronic conditions and comorbid symptoms for stability, deterioration, or improvement   Collaborate with multidisciplinary team to address chronic and comorbid conditions and prevent exacerbation or deterioration   Update acute care plan with appropriate goals if chronic or comorbid symptoms are exacerbated and prevent overall improvement and discharge     Problem: Discharge Planning  Goal: Discharge to home or other facility with appropriate resources  Outcome: Progressing  Flowsheets (Taken 3/3/2025 2000)  Discharge to home or other facility with appropriate resources:   Identify barriers to discharge with patient and caregiver   Arrange for needed discharge resources and transportation as appropriate   Identify discharge learning needs (meds, wound care, etc)   Refer to discharge planning if patient needs post-hospital services based on physician order or complex needs related to functional status, cognitive ability or social support system     Problem: Pain  Goal: Verbalizes/displays adequate comfort level or baseline comfort level  Outcome: Progressing     Problem: ABCDS Injury Assessment  Goal: Absence of physical injury  Outcome: Progressing

## 2025-03-04 NOTE — PLAN OF CARE
Problem: Chronic Conditions and Co-morbidities  Goal: Patient's chronic conditions and co-morbidity symptoms are monitored and maintained or improved  3/4/2025 1242 by Rosemary Camacho, RN  Outcome: Progressing  Flowsheets (Taken 3/4/2025 0819)  Care Plan - Patient's Chronic Conditions and Co-Morbidity Symptoms are Monitored and Maintained or Improved:   Monitor and assess patient's chronic conditions and comorbid symptoms for stability, deterioration, or improvement   Collaborate with multidisciplinary team to address chronic and comorbid conditions and prevent exacerbation or deterioration   Update acute care plan with appropriate goals if chronic or comorbid symptoms are exacerbated and prevent overall improvement and discharge     Problem: Discharge Planning  Goal: Discharge to home or other facility with appropriate resources  3/4/2025 1242 by Rosemary Camacho, RN  Outcome: Progressing  Flowsheets (Taken 3/4/2025 0819)  Discharge to home or other facility with appropriate resources:   Identify barriers to discharge with patient and caregiver   Arrange for needed discharge resources and transportation as appropriate   Identify discharge learning needs (meds, wound care, etc)   Refer to discharge planning if patient needs post-hospital services based on physician order or complex needs related to functional status, cognitive ability or social support system     Problem: Pain  Goal: Verbalizes/displays adequate comfort level or baseline comfort level  3/4/2025 1242 by Rosemary Camacho, RN  Outcome: Progressing  Flowsheets (Taken 3/4/2025 0819)  Verbalizes/displays adequate comfort level or baseline comfort level:   Encourage patient to monitor pain and request assistance   Assess pain using appropriate pain scale   Administer analgesics based on type and severity of pain and evaluate response   Implement non-pharmacological measures as appropriate and evaluate response     Problem: ABCDS Injury Assessment  Goal:  Absence of physical injury  3/4/2025 1242 by Rosemary Camacho, RN  Outcome: Progressing

## 2025-03-04 NOTE — PROGRESS NOTES
[facility]  Office: [phone]  David Haynes DO, Krunal Blackwell DO, Hunter Yan DO, Gio Giles DO, Lo Gerard MD, Dalila Woodard MD, Amanda De Guzman MD, aJyleen Moreno MD,  Pritesh Ernst MD, Blanquita Andujar MD, Princess Mehta MD,  Agapito Recio DO, Mingo Rubio MD, Keith Polanco MD, Roger Haynes DO, Vandana Swanson MD,  Weston Elizabeth DO, Mary Reyes MD, Rolanda Wayne MD, Rohini Esquivel MD, Sebastian Covington MD,  Benton Brown MD, Adina Rice MD, Martina Wagner MD, Arjun Anderson MD, Orion Verdugo MD, Chey Martins MD, Kenneth Hilario DO, Adán Mendoza MD, Agapito Means MD, Mohsin Reza, MD, Shirley Waterhouse, CNP,  Priya Peres CNP, Kenneth Walker, CNP,  Apurva Rod, KOSTAS, Gisela Love, CNP, Swetha No, CNP, Rosemary Wood, CNP, Yashira Corbin CNP, Ashley Jaimes PA-C, Charmaine Miller, CNP, Patricia Franco, CNP,  Megha Man, CNP, Marlyn Onofre, CNP, Ashly Skelton, CNP,  Mari Dewitt, CNS, Annemarie Gardner CNP, Tamika Grimes CNP,   Maribel Waters, CNP         [facility]   IN-PATIENT SERVICE   [facility]    Progress Note    3/4/2025    12:29 PM    Name:   Apurva Francis  MRN:     [id number]     Acct:      [id number]   Room:   [facility] Day:  2  Admit Date:  3/2/2025  9:09 AM    PCP:   Mandi Orozco APRN - CNP  Code Status:  Full Code    Subjective:     Patient was seen in follow-up for neutropenic fever. The patient reports feeling \"not good\" and is complaining of constipation. Urine culture is growing Escherichia coli sensitive to levofloxacin. Plan to continue IV antibiotics for an additional 24-hours with anticipated discharge home on PO levofloxacin tomorrow pending clinical course.     Medications:     Allergies:    Allergies   Allergen Reactions    Codeine Nausea Only       Current Meds:   Scheduled Meds:    docusate sodium  100 mg Oral Daily    cefTRIAXone (ROCEPHIN) IV  1,000 mg IntraVENous Q24H    allopurinol  300 mg  03/02/2025 11:48 AM       Radiology:  XR CHEST PORTABLE    Result Date: 3/2/2025  No acute pulmonary disease. Calcific atherosclerotic disease aorta.       Physical Examination:     General appearance:  alert, cooperative and no distress  Mental Status:  oriented to person, place and time and normal affect  Lungs:  clear to auscultation bilaterally, normal effort  Heart:  regular rate and rhythm, no murmur  Abdomen:  Mild tenderness to deep palpation; non-distended   Extremities:  no edema, redness, tenderness in the calves  Skin:  no gross lesions, rashes, induration    Assessment:     Hospital Problems             Last Modified POA    * (Principal) Neutropenic fever 3/2/2025 Yes    Type 2 diabetes mellitus without complication (HCC) 3/4/2025 Yes    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

## 2025-03-04 NOTE — PROGRESS NOTES
Today's Date: 3/4/2025  Patient Name: Apurva Francis  Date of admission: 3/2/2025  9:09 AM  Patient's age: 69 y.o., 1955  Admission Dx: Neutropenic fever [D70.9, R50.81]      Requesting Physician: Roger Haynes DO    CHIEF COMPLAINT: Febrile neutropenia.  UTI.  Non-Hodgkin's lymphoma    History Obtained From:  patient, electronic medical record    INTERVAL HISTORY:    Patient seen and examined  Complains of mild lower abdominal pain  Denied any fever chills  She did not have a bowel movement since Sunday  Urine growing E. Coli    HISTORY OF PRESENT ILLNESS:    The patient is a 69 y.o.  female who is admitted to the hospital for further management of febrile neutropenia after first treatment for non-Hodgkin's lymphoma.  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  She was started on treatment with R-CHOP on February 24, 2025.  She had Neulasta 24 hours after the chemotherapy.  She had generalized bodyaches but mainly in the jaw and chest wall secondary to Neulasta.  She started having fever about 4 days after the treatment.  No respiratory distress.  No sore throat.  No cough or sputum.  She has generalized weakness.  She presented to the emergency room and she had labs which showed severe neutropenia.  Further workup showed UTI.  Patient was admitted for management of febrile neutropenia..    Brief oncologic history:  DIAGNOSIS:        High grade Diffuse large B-cell NHL. Negative for MYC rearrangement.   Extensive right axillary lymphadenopathy.   CURRENT THERAPY:         Work up in progress  Start R-CHOP 2/24/25  BRIEF CASE HISTORY:      Ms. Apurva Francis is a very pleasant 69 y.o. female with history of multiple co morbidities as listed.  Patient is referred for evaluation and further management of axillary lymphadenopathy.  Patient states she felt a lump in the right axillary area October 2024.                          This note is created with the assistance of a speech recognition program.  While intending to generate a document that actually reflects the content of the visit, the document can still have some errors including those of syntax and sound a like substitutions which may escape proof reading.  It such instances, actual meaning can be extrapolated by contextual diversion.

## 2025-03-05 ENCOUNTER — APPOINTMENT (OUTPATIENT)
Dept: CT IMAGING | Age: 70
End: 2025-03-05
Payer: MEDICARE

## 2025-03-05 VITALS
RESPIRATION RATE: 16 BRPM | BODY MASS INDEX: 23.95 KG/M2 | DIASTOLIC BLOOD PRESSURE: 76 MMHG | WEIGHT: 149 LBS | SYSTOLIC BLOOD PRESSURE: 137 MMHG | HEIGHT: 66 IN | OXYGEN SATURATION: 97 % | HEART RATE: 91 BPM | TEMPERATURE: 98.8 F

## 2025-03-05 LAB
EKG ATRIAL RATE: 88 BPM
EKG P AXIS: 37 DEGREES
EKG P-R INTERVAL: 134 MS
EKG Q-T INTERVAL: 366 MS
EKG QRS DURATION: 78 MS
EKG QTC CALCULATION (BAZETT): 442 MS
EKG R AXIS: 27 DEGREES
EKG T AXIS: 35 DEGREES
EKG VENTRICULAR RATE: 88 BPM
ERYTHROCYTE [DISTWIDTH] IN BLOOD BY AUTOMATED COUNT: 13.2 % (ref 12.5–15.4)
HCT VFR BLD AUTO: 32.8 % (ref 36–46)
HGB BLD-MCNC: 11.4 G/DL (ref 12–16)
MCH RBC QN AUTO: 29.1 PG (ref 26–34)
MCHC RBC AUTO-ENTMCNC: 34.8 G/DL (ref 31–37)
MCV RBC AUTO: 83.8 FL (ref 80–100)
PLATELET # BLD AUTO: 96 K/UL (ref 140–450)
PMV BLD AUTO: 7.3 FL (ref 6–12)
RBC # BLD AUTO: 3.91 M/UL (ref 4–5.2)
WBC OTHER # BLD: 0.7 K/UL (ref 3.5–11)

## 2025-03-05 PROCEDURE — 6370000000 HC RX 637 (ALT 250 FOR IP): Performed by: STUDENT IN AN ORGANIZED HEALTH CARE EDUCATION/TRAINING PROGRAM

## 2025-03-05 PROCEDURE — 6370000000 HC RX 637 (ALT 250 FOR IP): Performed by: HOSPITALIST

## 2025-03-05 PROCEDURE — 74176 CT ABD & PELVIS W/O CONTRAST: CPT

## 2025-03-05 PROCEDURE — 6360000002 HC RX W HCPCS: Performed by: HOSPITALIST

## 2025-03-05 PROCEDURE — 36415 COLL VENOUS BLD VENIPUNCTURE: CPT

## 2025-03-05 PROCEDURE — 99238 HOSP IP/OBS DSCHRG MGMT 30/<: CPT | Performed by: STUDENT IN AN ORGANIZED HEALTH CARE EDUCATION/TRAINING PROGRAM

## 2025-03-05 PROCEDURE — 2500000003 HC RX 250 WO HCPCS: Performed by: HOSPITALIST

## 2025-03-05 PROCEDURE — 93010 ELECTROCARDIOGRAM REPORT: CPT | Performed by: INTERNAL MEDICINE

## 2025-03-05 PROCEDURE — 99232 SBSQ HOSP IP/OBS MODERATE 35: CPT | Performed by: INTERNAL MEDICINE

## 2025-03-05 PROCEDURE — 85027 COMPLETE CBC AUTOMATED: CPT

## 2025-03-05 PROCEDURE — 85025 COMPLETE CBC W/AUTO DIFF WBC: CPT

## 2025-03-05 RX ORDER — LEVOFLOXACIN 750 MG/1
750 TABLET, FILM COATED ORAL DAILY
Qty: 7 TABLET | Refills: 0 | Status: SHIPPED | OUTPATIENT
Start: 2025-03-05 | End: 2025-03-12

## 2025-03-05 RX ADMIN — TRAMADOL HYDROCHLORIDE 50 MG: 50 TABLET, COATED ORAL at 09:42

## 2025-03-05 RX ADMIN — ENOXAPARIN SODIUM 40 MG: 100 INJECTION SUBCUTANEOUS at 08:09

## 2025-03-05 RX ADMIN — POLYETHYLENE GLYCOL 3350 17 G: 17 POWDER, FOR SOLUTION ORAL at 08:09

## 2025-03-05 RX ADMIN — LANSOPRAZOLE 15 MG: 15 TABLET, ORALLY DISINTEGRATING ORAL at 08:09

## 2025-03-05 RX ADMIN — ALLOPURINOL 300 MG: 300 TABLET ORAL at 08:09

## 2025-03-05 RX ADMIN — SODIUM CHLORIDE, PRESERVATIVE FREE 10 ML: 5 INJECTION INTRAVENOUS at 08:09

## 2025-03-05 RX ADMIN — DOCUSATE SODIUM 100 MG: 100 CAPSULE, LIQUID FILLED ORAL at 08:09

## 2025-03-05 ASSESSMENT — PAIN SCALES - GENERAL
PAINLEVEL_OUTOF10: 2
PAINLEVEL_OUTOF10: 6
PAINLEVEL_OUTOF10: 4

## 2025-03-05 ASSESSMENT — PAIN DESCRIPTION - LOCATION: LOCATION: ABDOMEN

## 2025-03-05 ASSESSMENT — PAIN DESCRIPTION - DESCRIPTORS: DESCRIPTORS: DISCOMFORT

## 2025-03-05 ASSESSMENT — PAIN SCALES - WONG BAKER: WONGBAKER_NUMERICALRESPONSE: NO HURT

## 2025-03-05 NOTE — PLAN OF CARE
Problem: Chronic Conditions and Co-morbidities  Goal: Patient's chronic conditions and co-morbidity symptoms are monitored and maintained or improved  Outcome: Progressing  Flowsheets (Taken 3/4/2025 2000)  Care Plan - Patient's Chronic Conditions and Co-Morbidity Symptoms are Monitored and Maintained or Improved:   Monitor and assess patient's chronic conditions and comorbid symptoms for stability, deterioration, or improvement   Collaborate with multidisciplinary team to address chronic and comorbid conditions and prevent exacerbation or deterioration   Update acute care plan with appropriate goals if chronic or comorbid symptoms are exacerbated and prevent overall improvement and discharge     Problem: Discharge Planning  Goal: Discharge to home or other facility with appropriate resources  Outcome: Progressing  Flowsheets (Taken 3/4/2025 2000)  Discharge to home or other facility with appropriate resources:   Identify barriers to discharge with patient and caregiver   Arrange for needed discharge resources and transportation as appropriate   Identify discharge learning needs (meds, wound care, etc)   Refer to discharge planning if patient needs post-hospital services based on physician order or complex needs related to functional status, cognitive ability or social support system     Problem: Pain  Goal: Verbalizes/displays adequate comfort level or baseline comfort level  Outcome: Progressing     Problem: ABCDS Injury Assessment  Goal: Absence of physical injury  Outcome: Progressing

## 2025-03-05 NOTE — PLAN OF CARE
Problem: Chronic Conditions and Co-morbidities  Goal: Patient's chronic conditions and co-morbidity symptoms are monitored and maintained or improved  3/5/2025 1219 by Amy Lomas RN  Outcome: Progressing  Flowsheets (Taken 3/5/2025 0748)  Care Plan - Patient's Chronic Conditions and Co-Morbidity Symptoms are Monitored and Maintained or Improved: Monitor and assess patient's chronic conditions and comorbid symptoms for stability, deterioration, or improvement  3/5/2025 0255 by Ashley Ortez, RN  Outcome: Progressing  Flowsheets (Taken 3/4/2025 2000)  Care Plan - Patient's Chronic Conditions and Co-Morbidity Symptoms are Monitored and Maintained or Improved:   Monitor and assess patient's chronic conditions and comorbid symptoms for stability, deterioration, or improvement   Collaborate with multidisciplinary team to address chronic and comorbid conditions and prevent exacerbation or deterioration   Update acute care plan with appropriate goals if chronic or comorbid symptoms are exacerbated and prevent overall improvement and discharge     Problem: Discharge Planning  Goal: Discharge to home or other facility with appropriate resources  3/5/2025 1219 by Amy Lomas, RN  Outcome: Progressing  Flowsheets (Taken 3/5/2025 0748)  Discharge to home or other facility with appropriate resources: Identify barriers to discharge with patient and caregiver  3/5/2025 0255 by Ashley Ortez, RN  Outcome: Progressing  Flowsheets (Taken 3/4/2025 2000)  Discharge to home or other facility with appropriate resources:   Identify barriers to discharge with patient and caregiver   Arrange for needed discharge resources and transportation as appropriate   Identify discharge learning needs (meds, wound care, etc)   Refer to discharge planning if patient needs post-hospital services based on physician order or complex needs related to functional status, cognitive ability or social support system     Problem: Pain  Goal:  Verbalizes/displays adequate comfort level or baseline comfort level  3/5/2025 1219 by Amy Lomas, RN  Outcome: Progressing  3/5/2025 0255 by Ashley Ortez, RN  Outcome: Progressing     Problem: ABCDS Injury Assessment  Goal: Absence of physical injury  3/5/2025 1219 by Amy Lomas, RN  Outcome: Progressing  3/5/2025 0255 by Ashley Ortez, RN  Outcome: Progressing

## 2025-03-05 NOTE — FLOWSHEET NOTE
Iv removed with no complications. AVS reveiwed with patient, all questions answered. Patient taken to exit via [facility]air and transported home by friend.

## 2025-03-05 NOTE — PROGRESS NOTES
Today's Date: 3/5/2025  Patient Name: Apurva Francis  Date of admission: 3/2/2025  9:09 AM  Patient's age: 69 y.o., 1955  Admission Dx: Neutropenic fever [D70.9, R50.81]      Requesting Physician: Roger Haynes DO    CHIEF COMPLAINT: Febrile neutropenia.  UTI.  Non-Hodgkin's lymphoma    History Obtained From:  patient, electronic medical record    INTERVAL HISTORY:        Patient seen and examined  Labs and vitals reviewed  White cell count remains very low.  Patient afebrile Tmax 98.8.  H&H stable  Platelet count 96,000  Patient on Rocephin              HISTORY OF PRESENT ILLNESS:    The patient is a 69 y.o.  female who is admitted to the hospital for further management of febrile neutropenia after first treatment for non-Hodgkin's lymphoma.  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  She was started on treatment with R-CHOP on February 24, 2025.  She had Neulasta 24 hours after the chemotherapy.  She had generalized bodyaches but mainly in the jaw and chest wall secondary to Neulasta.  She started having fever about 4 days after the treatment.  No respiratory distress.  No sore throat.  No cough or sputum.  She has generalized weakness.  She presented to the emergency room and she had labs which showed severe neutropenia.  Further workup showed UTI.  Patient was admitted for management of febrile neutropenia..    Brief oncologic history:  DIAGNOSIS:        High grade Diffuse large B-cell NHL. Negative for MYC rearrangement.   Extensive right axillary lymphadenopathy.   CURRENT THERAPY:         Work up in progress  Start R-CHOP 2/24/25  BRIEF CASE HISTORY:      Ms. Apurva Francis is a very pleasant 69 y.o. female with history of multiple co morbidities as listed.  Patient is referred for evaluation and further management of axillary lymphadenopathy.  Patient states she felt a lump in the right

## 2025-03-05 NOTE — DISCHARGE SUMMARY
daily Use with associated glucose meter.     FreeStyle Gasper 3 Plus Sensor Misc  1 each by Does not apply route every 10 days     Handicap Placard Misc  by Does not apply route Expires 4/2025     Handicap Placard Misc  by Does not apply route Expires 2/2030     ibuprofen 200 MG tablet  Commonly known as: ADVIL;MOTRIN     lidocaine-prilocaine 2.5-2.5 % cream  Commonly known as: EMLA  Apply topically to port site 45-60 minutes prior to needle poke as needed.     Mounjaro 10 MG/0.5ML Soaj  Generic drug: Tirzepatide  INJECT THE CONTENTS OF ONE PEN  SUBCUTANEOUSLY WEEKLY AS  DIRECTED     Omeprazole 20 MG Tbdd     ondansetron 8 MG Tbdp disintegrating tablet  Commonly known as: ZOFRAN-ODT  Take 1 tablet by mouth every 8 hours as needed for Nausea or Vomiting     prochlorperazine 10 MG tablet  Commonly known as: COMPAZINE  Take 1 tablet by mouth every 6 hours as needed (for nausea or vomiting)     scopolamine transdermal patch  Commonly known as: TRANSDERM-SCOP  Place 1 patch onto the skin every 72 hours     tiZANidine 4 MG tablet  Commonly known as: Zanaflex  Take 1 tablet by mouth 3 times daily     vitamin D 1.25 MG (82042 UT) Caps capsule  Commonly known as: ERGOCALCIFEROL  Take 1 capsule by mouth once a week            STOP taking these medications      ivermectin 3 MG tablet     predniSONE 50 MG tablet  Commonly known as: DELTASONE               Where to Get Your Medications        These medications were sent to [facility] [id number] [facility] [address] [address] [phone] - F [phone]  [address] [address] [facility] [address]      Phone: [phone]   levoFLOXacin 750 MG tablet         Time spent on discharge is 20 mins in patient examination, evaluation, counseling as well as medication reconciliation, prescriptions for required medications, discharge plan and follow up.    Electronically signed by   Keith Polanco MD  3/5/2025  12:18 PM      Thank you Mandi Newman, APRN - CNP for the  opportunity to be involved in this patient's care.

## 2025-03-06 ENCOUNTER — CARE COORDINATION (OUTPATIENT)
Dept: CARE COORDINATION | Age: 70
End: 2025-03-06

## 2025-03-06 NOTE — CARE COORDINATION
Care Transitions Note    Initial Call - Call within 2 business days of discharge: Yes    Attempted to reach patient for transitions of care follow up. Unable to reach patient.    Outreach Attempts:   Multiple attempts to contact patient at phone numbers on file.     Patient: Apurva Francis    Patient : 1955   MRN: [id number]    Reason for Admission: Neutropenic fever/UTI  Discharge Date: 3/5/25  RURS: Readmission Risk Score: 9.7    Last Discharge Facility       Date Complaint Diagnosis Description Type Department Provider    3/2/25 Chest Pain; Fatigue; Fever Neutropenic fever ED to Hosp-Admission (Discharged) (ADMITTED) [facility] [address] [place]; José Miguel Hutchins...            Was this an external facility discharge? No    Follow Up Appointment:   Patient has hospital follow up appointment scheduled within 14 days of discharge.    Future Appointments         Provider Specialty Dept Phone    3/17/2025 8:30 AM Finesse Baeza MD Oncology [phone]    3/17/2025 9:00 AM [facility] MED ONC CHAIR 01 Infusion Therapy [phone]    3/18/2025 9:30 AM [facility] ONC FAST TRACK CHAIR 1 Infusion Therapy [phone]    2025 10:30 AM Mandi Orozco, APRN - CNP Primary Care [phone]            Plan for follow-up on next business day.      Rand Guerra RN

## 2025-03-07 ENCOUNTER — CARE COORDINATION (OUTPATIENT)
Dept: CARE COORDINATION | Age: 70
End: 2025-03-07

## 2025-03-07 DIAGNOSIS — N30.00 ACUTE CYSTITIS WITHOUT HEMATURIA: Primary | ICD-10-CM

## 2025-03-07 DIAGNOSIS — C83.34 DIFFUSE LARGE B-CELL LYMPHOMA OF LYMPH NODES OF AXILLA (HCC): Primary | ICD-10-CM

## 2025-03-07 LAB
MICROORGANISM SPEC CULT: NORMAL
SERVICE CMNT-IMP: NORMAL
SPECIMEN DESCRIPTION: NORMAL

## 2025-03-07 PROCEDURE — 1111F DSCHRG MED/CURRENT MED MERGE: CPT | Performed by: NURSE PRACTITIONER

## 2025-03-07 RX ORDER — TRAMADOL HYDROCHLORIDE 50 MG/1
50 TABLET ORAL EVERY 6 HOURS PRN
Qty: 100 TABLET | Refills: 0 | Status: SHIPPED | OUTPATIENT
Start: 2025-03-07 | End: 2025-04-06

## 2025-03-07 NOTE — CARE COORDINATION
Care Transitions Note    Initial Call - Call within 2 business days of discharge: Yes    Attempted to reach patient for transitions of care follow up. Unable to reach patient.    Outreach Attempts:   Multiple attempts to contact patient at phone numbers on file.     Patient: Apurva Francis    Patient : 1955   MRN: [id number]    Reason for Admission: Neutropenic fever  Discharge Date: 3/5/25  RURS: Readmission Risk Score: 9.7    Last Discharge Facility       Date Complaint Diagnosis Description Type Department Provider    3/2/25 Chest Pain; Fatigue; Fever Neutropenic fever ED to Hosp-Admission (Discharged) (ADMITTED) [facility] 3 Keith Thorpe MD; José Miguel Hutchins...          Attempted to reach patient for transitional outreach, 2nd attempt.  Patient had left message after hours yesterday returning my call.  She indicated that she did not go home with any pain medication as was wanting call back.  I left another message for patient today requesting return call.  She does have oncology navigator, Susan Tellez that follows.  I placed a call to her to let her know she was getting Ultram 50 mg every 4 hours prn in the hospital with good pain control.  Notes from inpatient side indicated that she is sensitive to many narcotics but got good relief with Ultram.  If I were able to reach patient, would send message to her oncologist about pain medication however am unsure if she contacted anyone else for this.  Relayed message to her oncology navigator in case she does not return my call.     Was this an external facility discharge? No    Follow Up Appointment:   Patient has hospital follow up appointment scheduled within 14 days of discharge.    Future Appointments         Provider Specialty Dept Phone    3/17/2025 8:30 AM Finesse Baeza MD Oncology [phone]    3/17/2025 9:00 AM [facility] MED ONC CHAIR 01 Infusion Therapy [phone]    3/18/2025 9:30 AM [facility] MED ONC FAST TRACK CHAIR 1 Infusion Therapy

## 2025-03-07 NOTE — CARE COORDINATION
Care Transitions Note    Initial Call - Call within 2 business days of discharge: Yes    Patient Current Location:  Home: [address] [place] [id number]    Care Transition Nurse contacted the patient by telephone to perform post hospital discharge assessment, verified name and  as identifiers. Provided introduction to self, and explanation of the Care Transition Nurse role.     Patient: Apurva Francis    Patient : 1955   MRN: [id number]    Reason for Admission: Neutropenic fever  Discharge Date: 3/5/25  RURS: Readmission Risk Score: 9.7      Last Discharge Facility       Date Complaint Diagnosis Description Type Department Provider    3/2/25 Chest Pain; Fatigue; Fever Neutropenic fever ED to Hosp-Admission (Discharged) (ADMITTED) [facility] 3 Keith Thorpe MD; José Miguel Hutchins...            Was this an external facility discharge? No    Additional needs identified to be addressed with provider   No needs identified             Method of communication with provider: none.    Patients top risk factors for readmission: medical condition-B cell non Hodgkins lymphoma    Interventions to address risk factors:   Education: medication as ordered, monitor for s/s of infection, f/c, n/v, increased fatigue/malaise, follow up with oncology and PCP.     Care Summary Note: Spoke with patient who returned my call today for initial 24 hour follow up.  Patient had come to the hospital with fever, has recently been diagnosed with B cell non Hodgkins lymphoma, has had 1 chemo treatment so far.  She developed fever at home and came in for evaluation.  She was found to have E. Coli UTI and was treated with ATB and discharged to home.  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  When unable to reach earlier today, I did speak with her oncology navigator to relay she was getting Ultram in the hospital with better pain control but had not gotten script at  [facility] EMERGENCY DEPARTMENT  [address] [facility] [address]  Phone: [phone]    Esme Herrera was seen and treated in our emergency department on 3/7/2025.  She may return to work on 03/10/2025.         Thank you for choosing [facility].    [place] Sanderson APRN

## 2025-03-08 LAB
MICROORGANISM SPEC CULT: NORMAL
SERVICE CMNT-IMP: NORMAL
SPECIMEN DESCRIPTION: NORMAL

## 2025-03-08 NOTE — TELEPHONE ENCOUNTER
Care Transitions Initial Follow Up Call    Outreach made within 2 business days of discharge: Yes    Patient: Apurva Francis Patient : 1955   MRN: [id number]  Reason for Admission: neutropenic fever/uti     Discharge Date: 3/5/25       Spoke with: patient     Discharge department/facility: [facility]      TCM Interactive Patient Contact:  Was patient able to fill all prescriptions: Yes  Was patient instructed to bring all medications to the follow-up visit: Yes  Is patient taking all medications as directed in the discharge summary?   Does patient understand their discharge instructions:   Does patient have questions or concerns that need addressed prior to 7-14 day follow up office visit: no    Additional needs identified to be addressed with provider  No needs identified    Patient declined needing a hosp. F/u with pcp, patient is following with oncology.          Scheduled appointment with PCP within 7-14 days    Follow Up  Future Appointments   Date Time Provider Department Center   3/17/2025  8:30 AM [facility]   3/17/2025  9:00 AM ST PB MED ONC CHAIR 01 [facility] [place]   3/18/2025  9:30 AM STV PB MED ONC FAST TRACK CHAIR 1 [facility] [place]   2025 10:30 AM Mandi Orozco APRN - CNP [facility] ANKIT Levin MA

## 2025-03-10 ENCOUNTER — TELEPHONE (OUTPATIENT)
Dept: ONCOLOGY | Age: 70
End: 2025-03-10

## 2025-03-10 NOTE — TELEPHONE ENCOUNTER
Name: Apurva Francis  : 1955  MRN: [id number]    Oncology Navigation Follow-Up Note    Contact Type:  Telephone    Notes: Writer spoke with pt who reports she received Ultram on Friday. She stated it did not give her the pain control she was hoping for. She rates pain currently a 4/10. We discussed having a goal of making her pain tolerable and the fact that it may not go away completely. She voiced concerns about medications causing her constipation and fear of becoming addicted to narcotics. Writer provided education and support on these topics. Pt is requesting a pain medication that is slightly stronger than Ultram. Writer will update Dr Cr on pt's request.       Electronically signed by Susan Tellez RN on 3/10/2025 at 9:33 AM

## 2025-03-12 ENCOUNTER — CARE COORDINATION (OUTPATIENT)
Dept: CARE COORDINATION | Age: 70
End: 2025-03-12

## 2025-03-12 NOTE — CARE COORDINATION
Care Transitions Note    Follow Up Call     Patient Current Location:  Home: [address] [place] [address]    Care Transition Nurse contacted the patient by telephone. Verified name and  as identifiers.    Additional needs identified to be addressed with provider   No needs identified                 Method of communication with provider: none.    Care Summary Note: Spoke with patient for transitional follow up call. Patient had come into the hospital with neutropenic fever, had her first chemo and then developed fever the following week. She was found to have UTI and was treated for this, she is taking her last dose of ATB today.  Last week she was painful yet, she was getting Ultram while inpatient which was documented that she was getting good relief from.  We did get her a script for this for her pain but she said when taking, it didn't seem to do much for her pain but was finding it was constipating her.  She stopped taking that medication and started to use Tylenol and Ibuprofen.  Pain is bone pain, states her axilla area will get a sharp pain every once in a while but the bone pain is the most bothersome.  She is in better spirits today, last week she was tearful, was not feeling well and discouraged she had to come in to the hospital after her first chemo, she has recently lost her spouse of 34 years only about 3 months ago.  Bowels are getting better, she is using medication to help stay regular.  States was told by oncology that after next treatment will probably start losing some of her hair, she is saying today it has started to come out already, is planning on cutting it off today, scalp is somewhat tender.  She is eating and drinking ok, doing a little better and feeling better.  Expressed will check in again next week but if any needs, questions or concerns to feel free to call.       Plan of care updates since last contact:  None       Advance Care Planning:   Does patient have an

## 2025-03-16 ENCOUNTER — HOSPITAL ENCOUNTER (OUTPATIENT)
Age: 70
Discharge: HOME | End: 2025-03-16
Payer: MEDICARE

## 2025-03-16 DIAGNOSIS — C83.34 DIFFUSE LARGE B-CELL LYMPHOMA OF LYMPH NODES OF AXILLA (HCC): ICD-10-CM

## 2025-03-16 LAB
ALBUMIN SERPL-MCNC: 3.9 G/DL (ref 3.5–5.2)
ALBUMIN/GLOB SERPL: 1.3 {RATIO} (ref 1–2.5)
ALP SERPL-CCNC: 110 U/L (ref 35–104)
ALT SERPL-CCNC: 18 U/L (ref 5–33)
ANION GAP SERPL CALCULATED.3IONS-SCNC: 8 MMOL/L (ref 9–17)
AST SERPL-CCNC: 36 U/L
BASOPHILS # BLD: 0.16 K/UL (ref 0–0.2)
BASOPHILS NFR BLD: 2 % (ref 0–2)
BILIRUB SERPL-MCNC: 0.3 MG/DL (ref 0.3–1.2)
BUN SERPL-MCNC: 19 MG/DL (ref 8–23)
CALCIUM SERPL-MCNC: 9.3 MG/DL (ref 8.6–10.4)
CHLORIDE SERPL-SCNC: 106 MMOL/L (ref 98–107)
CO2 SERPL-SCNC: 27 MMOL/L (ref 20–31)
CREAT SERPL-MCNC: 0.6 MG/DL (ref 0.5–0.9)
EOSINOPHIL # BLD: 0.03 K/UL (ref 0–0.44)
EOSINOPHILS RELATIVE PERCENT: 0 % (ref 1–4)
ERYTHROCYTE [DISTWIDTH] IN BLOOD BY AUTOMATED COUNT: 14.6 % (ref 11.8–14.4)
GFR, ESTIMATED: >90 ML/MIN/1.73M2
GLUCOSE SERPL-MCNC: 138 MG/DL (ref 70–99)
HCT VFR BLD AUTO: 35.7 % (ref 36.3–47.1)
HGB BLD-MCNC: 12.1 G/DL (ref 11.9–15.1)
IMM GRANULOCYTES # BLD AUTO: 0.06 K/UL (ref 0–0.3)
IMM GRANULOCYTES NFR BLD: 1 %
LYMPHOCYTES NFR BLD: 0.95 K/UL (ref 1.1–3.7)
LYMPHOCYTES RELATIVE PERCENT: 9 % (ref 24–43)
MCH RBC QN AUTO: 29.7 PG (ref 25.2–33.5)
MCHC RBC AUTO-ENTMCNC: 33.9 G/DL (ref 28.4–34.8)
MCV RBC AUTO: 87.7 FL (ref 82.6–102.9)
MONOCYTES NFR BLD: 1.01 K/UL (ref 0.1–1.2)
MONOCYTES NFR BLD: 9 % (ref 3–12)
NEUTROPHILS NFR BLD: 79 % (ref 36–65)
NEUTS SEG NFR BLD: 8.55 K/UL (ref 1.5–8.1)
NRBC BLD-RTO: 0 PER 100 WBC
PLATELET # BLD AUTO: 399 K/UL (ref 138–453)
PMV BLD AUTO: 9.2 FL (ref 8.1–13.5)
POTASSIUM SERPL-SCNC: 4.7 MMOL/L (ref 3.7–5.3)
PROT SERPL-MCNC: 7 G/DL (ref 6.4–8.3)
RBC # BLD AUTO: 4.07 M/UL (ref 3.95–5.11)
RBC # BLD: ABNORMAL 10*6/UL
SODIUM SERPL-SCNC: 141 MMOL/L (ref 135–144)
WBC OTHER # BLD: 10.8 K/UL (ref 3.5–11.3)

## 2025-03-16 PROCEDURE — 80053 COMPREHEN METABOLIC PANEL: CPT

## 2025-03-16 PROCEDURE — 36415 COLL VENOUS BLD VENIPUNCTURE: CPT

## 2025-03-16 PROCEDURE — 85025 COMPLETE CBC W/AUTO DIFF WBC: CPT

## 2025-03-17 ENCOUNTER — HOSPITAL ENCOUNTER (OUTPATIENT)
Dept: INFUSION THERAPY | Age: 70
Discharge: HOME | End: 2025-03-17
Payer: MEDICARE

## 2025-03-17 ENCOUNTER — OFFICE VISIT (OUTPATIENT)
Dept: ONCOLOGY | Age: 70
End: 2025-03-17
Payer: MEDICARE

## 2025-03-17 VITALS
RESPIRATION RATE: 16 BRPM | HEIGHT: 65 IN | SYSTOLIC BLOOD PRESSURE: 128 MMHG | OXYGEN SATURATION: 98 % | DIASTOLIC BLOOD PRESSURE: 88 MMHG | BODY MASS INDEX: 23.86 KG/M2 | HEART RATE: 88 BPM | WEIGHT: 143.2 LBS

## 2025-03-17 DIAGNOSIS — C83.34 DIFFUSE LARGE B-CELL LYMPHOMA OF LYMPH NODES OF AXILLA (HCC): Primary | ICD-10-CM

## 2025-03-17 PROCEDURE — 1123F ACP DISCUSS/DSCN MKR DOCD: CPT | Performed by: INTERNAL MEDICINE

## 2025-03-17 PROCEDURE — 3017F COLORECTAL CA SCREEN DOC REV: CPT | Performed by: INTERNAL MEDICINE

## 2025-03-17 PROCEDURE — 96375 TX/PRO/DX INJ NEW DRUG ADDON: CPT

## 2025-03-17 PROCEDURE — 1126F AMNT PAIN NOTED NONE PRSNT: CPT | Performed by: INTERNAL MEDICINE

## 2025-03-17 PROCEDURE — 6360000002 HC RX W HCPCS: Performed by: INTERNAL MEDICINE

## 2025-03-17 PROCEDURE — 1090F PRES/ABSN URINE INCON ASSESS: CPT | Performed by: INTERNAL MEDICINE

## 2025-03-17 PROCEDURE — 3079F DIAST BP 80-89 MM HG: CPT | Performed by: INTERNAL MEDICINE

## 2025-03-17 PROCEDURE — 96417 CHEMO IV INFUS EACH ADDL SEQ: CPT

## 2025-03-17 PROCEDURE — G8427 DOCREV CUR MEDS BY ELIG CLIN: HCPCS | Performed by: INTERNAL MEDICINE

## 2025-03-17 PROCEDURE — 2500000003 HC RX 250 WO HCPCS: Performed by: INTERNAL MEDICINE

## 2025-03-17 PROCEDURE — 96367 TX/PROPH/DG ADDL SEQ IV INF: CPT

## 2025-03-17 PROCEDURE — 3074F SYST BP LT 130 MM HG: CPT | Performed by: INTERNAL MEDICINE

## 2025-03-17 PROCEDURE — 1036F TOBACCO NON-USER: CPT | Performed by: INTERNAL MEDICINE

## 2025-03-17 PROCEDURE — 1111F DSCHRG MED/CURRENT MED MERGE: CPT | Performed by: INTERNAL MEDICINE

## 2025-03-17 PROCEDURE — 96413 CHEMO IV INFUSION 1 HR: CPT

## 2025-03-17 PROCEDURE — 6370000000 HC RX 637 (ALT 250 FOR IP): Performed by: INTERNAL MEDICINE

## 2025-03-17 PROCEDURE — 96411 CHEMO IV PUSH ADDL DRUG: CPT

## 2025-03-17 PROCEDURE — 99211 OFF/OP EST MAY X REQ PHY/QHP: CPT | Performed by: INTERNAL MEDICINE

## 2025-03-17 PROCEDURE — 2580000003 HC RX 258: Performed by: INTERNAL MEDICINE

## 2025-03-17 PROCEDURE — G8399 PT W/DXA RESULTS DOCUMENT: HCPCS | Performed by: INTERNAL MEDICINE

## 2025-03-17 PROCEDURE — 1159F MED LIST DOCD IN RCRD: CPT | Performed by: INTERNAL MEDICINE

## 2025-03-17 PROCEDURE — 99214 OFFICE O/P EST MOD 30 MIN: CPT | Performed by: INTERNAL MEDICINE

## 2025-03-17 PROCEDURE — G8420 CALC BMI NORM PARAMETERS: HCPCS | Performed by: INTERNAL MEDICINE

## 2025-03-17 PROCEDURE — 96415 CHEMO IV INFUSION ADDL HR: CPT

## 2025-03-17 RX ORDER — HEPARIN 100 UNIT/ML
500 SYRINGE INTRAVENOUS PRN
Status: DISCONTINUED | OUTPATIENT
Start: 2025-03-17 | End: 2025-03-18 | Stop reason: HOSPADM

## 2025-03-17 RX ORDER — PREDNISONE 50 MG/1
100 TABLET ORAL DAILY
Qty: 20 TABLET | Refills: 0 | Status: SHIPPED | OUTPATIENT
Start: 2025-03-17 | End: 2025-03-27

## 2025-03-17 RX ORDER — ALLOPURINOL 300 MG/1
300 TABLET ORAL SEE ADMIN INSTRUCTIONS
Qty: 30 TABLET | Refills: 3 | Status: SHIPPED | OUTPATIENT
Start: 2025-03-17 | End: 2025-03-17 | Stop reason: SDUPTHER

## 2025-03-17 RX ORDER — PREDNISONE 50 MG/1
TABLET ORAL
Qty: 10 TABLET | Refills: 0 | OUTPATIENT
Start: 2025-03-17

## 2025-03-17 RX ORDER — DOXORUBICIN HYDROCHLORIDE 2 MG/ML
50 INJECTION, SOLUTION INTRAVENOUS ONCE
Status: COMPLETED | OUTPATIENT
Start: 2025-03-17 | End: 2025-03-17

## 2025-03-17 RX ORDER — SODIUM CHLORIDE 0.9 % (FLUSH) 0.9 %
5-40 SYRINGE (ML) INJECTION PRN
Status: DISCONTINUED | OUTPATIENT
Start: 2025-03-17 | End: 2025-03-18 | Stop reason: HOSPADM

## 2025-03-17 RX ORDER — ACETAMINOPHEN 325 MG/1
650 TABLET ORAL ONCE
Status: COMPLETED | OUTPATIENT
Start: 2025-03-17 | End: 2025-03-17

## 2025-03-17 RX ORDER — SODIUM CHLORIDE 9 MG/ML
5-250 INJECTION, SOLUTION INTRAVENOUS PRN
Status: DISCONTINUED | OUTPATIENT
Start: 2025-03-17 | End: 2025-03-18 | Stop reason: HOSPADM

## 2025-03-17 RX ORDER — ALLOPURINOL 300 MG/1
300 TABLET ORAL DAILY
Qty: 30 TABLET | Refills: 0 | Status: SHIPPED | OUTPATIENT
Start: 2025-03-17 | End: 2025-04-16

## 2025-03-17 RX ORDER — PALONOSETRON 0.05 MG/ML
0.25 INJECTION, SOLUTION INTRAVENOUS ONCE
Status: COMPLETED | OUTPATIENT
Start: 2025-03-17 | End: 2025-03-17

## 2025-03-17 RX ORDER — DIPHENHYDRAMINE HYDROCHLORIDE 50 MG/ML
50 INJECTION, SOLUTION INTRAMUSCULAR; INTRAVENOUS ONCE
Status: COMPLETED | OUTPATIENT
Start: 2025-03-17 | End: 2025-03-17

## 2025-03-17 RX ORDER — HYDROCODONE BITARTRATE AND ACETAMINOPHEN 5; 325 MG/1; MG/1
1 TABLET ORAL EVERY 6 HOURS PRN
Qty: 100 TABLET | Refills: 0 | Status: SHIPPED | OUTPATIENT
Start: 2025-03-17 | End: 2025-04-16

## 2025-03-17 RX ADMIN — HEPARIN 500 UNITS: 100 SYRINGE at 15:02

## 2025-03-17 RX ADMIN — SODIUM CHLORIDE 200 ML/HR: 0.9 INJECTION, SOLUTION INTRAVENOUS at 09:30

## 2025-03-17 RX ADMIN — ACETAMINOPHEN 650 MG: 325 TABLET ORAL at 09:30

## 2025-03-17 RX ADMIN — DIPHENHYDRAMINE HYDROCHLORIDE 50 MG: 50 INJECTION INTRAMUSCULAR; INTRAVENOUS at 09:31

## 2025-03-17 RX ADMIN — SODIUM CHLORIDE 150 MG: 9 INJECTION, SOLUTION INTRAVENOUS at 09:40

## 2025-03-17 RX ADMIN — SODIUM CHLORIDE, PRESERVATIVE FREE 10 ML: 5 INJECTION INTRAVENOUS at 09:25

## 2025-03-17 RX ADMIN — DOXORUBICIN HYDROCHLORIDE 88 MG: 2 INJECTION, SOLUTION INTRAVENOUS at 13:54

## 2025-03-17 RX ADMIN — SODIUM CHLORIDE 700 MG: 9 INJECTION, SOLUTION INTRAVENOUS at 10:16

## 2025-03-17 RX ADMIN — PALONOSETRON 0.25 MG: 0.05 INJECTION, SOLUTION INTRAVENOUS at 09:31

## 2025-03-17 RX ADMIN — SODIUM CHLORIDE, PRESERVATIVE FREE 10 ML: 5 INJECTION INTRAVENOUS at 15:02

## 2025-03-17 RX ADMIN — VINCRISTINE SULFATE 2 MG: 1 INJECTION, SOLUTION INTRAVENOUS at 14:00

## 2025-03-17 RX ADMIN — CYCLOPHOSPHAMIDE 1320 MG: 200 INJECTION, SOLUTION INTRAVENOUS at 14:17

## 2025-03-17 NOTE — PROGRESS NOTES
Pt here for C.2D.1 R-CHOP.  Arrives ambulatory.  Denies any new complaints.  Labs  results reviewed.  Pt was seen by Dr. Perez, order rec'd to proceed with tx.  Tx complete without incident.  Pt d/c'd in stable condition.  Returns 3/18 for C2D2.

## 2025-03-17 NOTE — PROGRESS NOTES
Spiritual Health Outpatient Oncology/Hematology Progress Note: [facility]    Situation: Writer visited with Patient in the treatment cubicle of the infusion clinic.     Assessment: Patient shared that she was doing better than the last time writer saw her. Pt spoke of the books she is reading and the complementary medicine she is taking. Pt expressed gratitude for the resources she has, acknowledging that others who have cancer have difficulties.    Intervention: Writer inquired about Pt's coping and needs. Writer explored Pt's sources of support and strength. Writer offered supportive presence and active listening. Writer affirmed Pt's strengths. Writer offered words of support and encouragement.     Outcome: Pt's phone rang, and she answered it, as it was her friend.    Plan: Spiritual Health Services are available for Patient by phone and/or in person.      03/17/25 1524   Encounter Summary   Encounter Overview/Reason Spiritual/Emotional Needs   Service Provided For Patient   Referral/Consult From [facility]   Support System Family members;Friends/neighbors   Last Encounter  02/24/25   Complexity of Encounter Moderate   Begin Time 1400   End Time  1410   Total Time Calculated 10 min   Spiritual/Emotional needs   Type Spiritual Support   Assessment/Intervention/Outcome   Assessment Calm;Coping   Intervention Sustaining Presence/Ministry of presence;Active listening;Explored/Affirmed feelings, thoughts, concerns;Explored Coping Skills/Resources;Discussed illness injury and it’s impact;Discussed meaning/purpose   Outcome Expressed feelings, needs, and concerns;Engaged in conversation;Coping   Plan and Referrals   Plan/Referrals Continue Support (comment)     KENYATTA Luna  [facility]ual [facility]   [phone]

## 2025-03-18 ENCOUNTER — HOSPITAL ENCOUNTER (OUTPATIENT)
Dept: INFUSION THERAPY | Age: 70
Discharge: HOME | End: 2025-03-18
Payer: MEDICARE

## 2025-03-18 DIAGNOSIS — C83.34 DIFFUSE LARGE B-CELL LYMPHOMA OF LYMPH NODES OF AXILLA (HCC): Primary | ICD-10-CM

## 2025-03-18 PROCEDURE — 96372 THER/PROPH/DIAG INJ SC/IM: CPT

## 2025-03-18 PROCEDURE — 6360000002 HC RX W HCPCS: Performed by: INTERNAL MEDICINE

## 2025-03-18 RX ADMIN — PEGFILGRASTIM-JMDB 6 MG: 6 INJECTION SUBCUTANEOUS at 09:49

## 2025-03-18 NOTE — PROGRESS NOTES
with no adjustment. Patient was reminded about potential side effects to treatment. We will continue to monitor labs.   Patient's questions were answered to the best of her satisfaction and she verbalized full understanding and agreement.                            Finesse Perez MD                          [facility]                            This note is created with the assistance of a speech recognition program.  While intending to generate a document that actually reflects the content of the visit, the document can still have some errors including those of syntax and sound a like substitutions which may escape proof reading.  It such instances, actual meaning can be extrapolated by contextual diversion.

## 2025-03-19 ENCOUNTER — CARE COORDINATION (OUTPATIENT)
Dept: CARE COORDINATION | Age: 70
End: 2025-03-19

## 2025-03-19 NOTE — CARE COORDINATION
symptoms: In the past 7 days  Patient-reported symptoms: Pain  Have your medications changed?: Yes  Patient Reports: Norco script given  Do you have any questions related to your medications?: No  Do you currently have any active services?: No  Do you have any needs or concerns that I can assist you with?: No  Identified Barriers: Lack of Education  Care Transitions Interventions  Other Interventions:              Follow Up Appointment:   Reviewed upcoming appointment(s).  Future Appointments         Provider Specialty Dept Phone    4/7/2025 8:45 AM Finesse Baeza MD Oncology [phone]    4/7/2025 9:00 AM STV PB MED ONC CHAIR 11 Infusion Therapy [phone]    4/8/2025 8:30 AM STV PB MED ONC FAST TRACK CHAIR 1 Infusion Therapy [phone]    8/18/2025 10:30 AM Mandi Orozoc APRN - CNP Primary Care [phone]            Care Transition Nurse provided contact information.  Plan for follow-up call in 6-10 days based on severity of symptoms and risk factors.  Plan for next call:  How is bone pain? Any issues this week?       Rand Guerra RN

## 2025-03-20 ENCOUNTER — TELEPHONE (OUTPATIENT)
Dept: ONCOLOGY | Age: 70
End: 2025-03-20

## 2025-03-20 NOTE — TELEPHONE ENCOUNTER
[facility] Oncology Nutrition Note:   Chart reviewed d/t positive nutrition screen. At time of screen patient had reported poor appetite/intake, early satiety, and feeling not my normal self, but able to be up and about with fairly normal activities. Writer called patient for nutrition evaluation/to inquire about any nutrition questions/concerns pt may have. No answer. Detailed message left with writer's contact information.       Apurva Calderon RD, LD, CNSC  Registered Dietitian  [facility]  [phone]

## 2025-03-26 ENCOUNTER — HOSPITAL ENCOUNTER (EMERGENCY)
Age: 70
Discharge: HOME | End: 2025-03-26
Attending: EMERGENCY MEDICINE
Payer: MEDICARE

## 2025-03-26 ENCOUNTER — APPOINTMENT (OUTPATIENT)
Dept: CT IMAGING | Age: 70
End: 2025-03-26
Payer: MEDICARE

## 2025-03-26 VITALS
HEART RATE: 96 BPM | WEIGHT: 138 LBS | SYSTOLIC BLOOD PRESSURE: 158 MMHG | BODY MASS INDEX: 23.56 KG/M2 | HEIGHT: 64 IN | TEMPERATURE: 98.1 F | DIASTOLIC BLOOD PRESSURE: 83 MMHG | OXYGEN SATURATION: 97 % | RESPIRATION RATE: 16 BRPM

## 2025-03-26 DIAGNOSIS — N39.0 URINARY TRACT INFECTION WITHOUT HEMATURIA, SITE UNSPECIFIED: Primary | ICD-10-CM

## 2025-03-26 LAB
ALBUMIN SERPL-MCNC: 4 G/DL (ref 3.5–5.2)
ALBUMIN/GLOB SERPL: 1.4 {RATIO} (ref 1–2.5)
ALP SERPL-CCNC: 101 U/L (ref 35–104)
ALT SERPL-CCNC: 23 U/L (ref 5–33)
ANION GAP SERPL CALCULATED.3IONS-SCNC: 10 MMOL/L (ref 9–17)
AST SERPL-CCNC: 22 U/L
ATYPICAL LYMPHOCYTE ABSOLUTE COUNT: 0.14 K/UL
ATYPICAL LYMPHOCYTES: 3 %
BACTERIA URNS QL MICRO: ABNORMAL
BASOPHILS # BLD: 0 K/UL (ref 0–0.2)
BASOPHILS NFR BLD: 0 % (ref 0–2)
BILIRUB SERPL-MCNC: 0.3 MG/DL (ref 0.3–1.2)
BILIRUB UR QL STRIP: NEGATIVE
BUN SERPL-MCNC: 14 MG/DL (ref 8–23)
CALCIUM SERPL-MCNC: 9.3 MG/DL (ref 8.6–10.4)
CHARACTER UR: ABNORMAL
CHLORIDE SERPL-SCNC: 100 MMOL/L (ref 98–107)
CLARITY UR: ABNORMAL
CO2 SERPL-SCNC: 26 MMOL/L (ref 20–31)
COLOR UR: YELLOW
CREAT SERPL-MCNC: 0.5 MG/DL (ref 0.5–0.9)
EOSINOPHIL # BLD: 0 K/UL (ref 0–0.4)
EOSINOPHILS RELATIVE PERCENT: 0 % (ref 1–4)
EPI CELLS #/AREA URNS HPF: ABNORMAL /HPF (ref 0–5)
ERYTHROCYTE [DISTWIDTH] IN BLOOD BY AUTOMATED COUNT: 14.7 % (ref 12.5–15.4)
GFR, ESTIMATED: >90 ML/MIN/1.73M2
GLUCOSE SERPL-MCNC: 201 MG/DL (ref 70–99)
GLUCOSE UR STRIP-MCNC: ABNORMAL MG/DL
HCT VFR BLD AUTO: 32.7 % (ref 36–46)
HGB BLD-MCNC: 11.3 G/DL (ref 12–16)
HGB UR QL STRIP.AUTO: NEGATIVE
KETONES UR STRIP-MCNC: NEGATIVE MG/DL
LEUKOCYTE ESTERASE UR QL STRIP: ABNORMAL
LIPASE SERPL-CCNC: 24 U/L (ref 13–60)
LYMPHOCYTES NFR BLD: 0.72 K/UL (ref 1–4.8)
LYMPHOCYTES RELATIVE PERCENT: 16 % (ref 24–44)
MAGNESIUM SERPL-MCNC: 1.7 MG/DL (ref 1.6–2.6)
MCH RBC QN AUTO: 29.3 PG (ref 26–34)
MCHC RBC AUTO-ENTMCNC: 34.6 G/DL (ref 31–37)
MCV RBC AUTO: 84.7 FL (ref 80–100)
MONOCYTES NFR BLD: 0.5 K/UL (ref 0.1–0.8)
MONOCYTES NFR BLD: 11 % (ref 1–7)
MORPHOLOGY: NORMAL
NEUTROPHILS NFR BLD: 70 % (ref 36–66)
NEUTS SEG NFR BLD: 3.14 K/UL (ref 1.8–7.7)
NITRITE UR QL STRIP: NEGATIVE
NUCLEATED RED BLOOD CELLS: 2 PER 100 WBC
PH UR STRIP: 7 [PH] (ref 5–8)
PLATELET # BLD AUTO: 124 K/UL (ref 140–450)
PMV BLD AUTO: 8.4 FL (ref 6–12)
POTASSIUM SERPL-SCNC: 4.1 MMOL/L (ref 3.7–5.3)
PROT SERPL-MCNC: 6.8 G/DL (ref 6.4–8.3)
PROT UR STRIP-MCNC: NEGATIVE MG/DL
RBC # BLD AUTO: 3.86 M/UL (ref 4–5.2)
RBC #/AREA URNS HPF: ABNORMAL /HPF (ref 0–2)
SODIUM SERPL-SCNC: 136 MMOL/L (ref 135–144)
SP GR UR STRIP: 1.01 (ref 1–1.03)
UROBILINOGEN UR STRIP-ACNC: NORMAL EU/DL (ref 0–1)
WBC #/AREA URNS HPF: ABNORMAL /HPF (ref 0–5)
WBC OTHER # BLD: 4.5 K/UL (ref 3.5–11)

## 2025-03-26 PROCEDURE — 87086 URINE CULTURE/COLONY COUNT: CPT

## 2025-03-26 PROCEDURE — 96374 THER/PROPH/DIAG INJ IV PUSH: CPT

## 2025-03-26 PROCEDURE — 85025 COMPLETE CBC W/AUTO DIFF WBC: CPT

## 2025-03-26 PROCEDURE — 83690 ASSAY OF LIPASE: CPT

## 2025-03-26 PROCEDURE — 6360000002 HC RX W HCPCS: Performed by: EMERGENCY MEDICINE

## 2025-03-26 PROCEDURE — 96361 HYDRATE IV INFUSION ADD-ON: CPT

## 2025-03-26 PROCEDURE — 96375 TX/PRO/DX INJ NEW DRUG ADDON: CPT

## 2025-03-26 PROCEDURE — 2500000003 HC RX 250 WO HCPCS: Performed by: EMERGENCY MEDICINE

## 2025-03-26 PROCEDURE — 83735 ASSAY OF MAGNESIUM: CPT

## 2025-03-26 PROCEDURE — 80053 COMPREHEN METABOLIC PANEL: CPT

## 2025-03-26 PROCEDURE — 2580000003 HC RX 258: Performed by: EMERGENCY MEDICINE

## 2025-03-26 PROCEDURE — 99285 EMERGENCY DEPT VISIT HI MDM: CPT

## 2025-03-26 PROCEDURE — 81001 URINALYSIS AUTO W/SCOPE: CPT

## 2025-03-26 PROCEDURE — 6360000004 HC RX CONTRAST MEDICATION: Performed by: EMERGENCY MEDICINE

## 2025-03-26 PROCEDURE — 74177 CT ABD & PELVIS W/CONTRAST: CPT

## 2025-03-26 RX ORDER — ONDANSETRON 2 MG/ML
4 INJECTION INTRAMUSCULAR; INTRAVENOUS ONCE
Status: COMPLETED | OUTPATIENT
Start: 2025-03-26 | End: 2025-03-26

## 2025-03-26 RX ORDER — IOPAMIDOL 755 MG/ML
75 INJECTION, SOLUTION INTRAVASCULAR
Status: COMPLETED | OUTPATIENT
Start: 2025-03-26 | End: 2025-03-26

## 2025-03-26 RX ORDER — CEFPODOXIME PROXETIL 200 MG/1
200 TABLET, FILM COATED ORAL 2 TIMES DAILY
Qty: 14 TABLET | Refills: 0 | Status: SHIPPED | OUTPATIENT
Start: 2025-03-26 | End: 2025-04-02

## 2025-03-26 RX ORDER — 0.9 % SODIUM CHLORIDE 0.9 %
80 INTRAVENOUS SOLUTION INTRAVENOUS ONCE
Status: DISCONTINUED | OUTPATIENT
Start: 2025-03-26 | End: 2025-03-26 | Stop reason: HOSPADM

## 2025-03-26 RX ORDER — FENTANYL CITRATE 50 UG/ML
50 INJECTION, SOLUTION INTRAMUSCULAR; INTRAVENOUS ONCE
Status: COMPLETED | OUTPATIENT
Start: 2025-03-26 | End: 2025-03-26

## 2025-03-26 RX ORDER — SODIUM CHLORIDE 0.9 % (FLUSH) 0.9 %
10 SYRINGE (ML) INJECTION
Status: COMPLETED | OUTPATIENT
Start: 2025-03-26 | End: 2025-03-26

## 2025-03-26 RX ORDER — 0.9 % SODIUM CHLORIDE 0.9 %
1000 INTRAVENOUS SOLUTION INTRAVENOUS ONCE
Status: COMPLETED | OUTPATIENT
Start: 2025-03-26 | End: 2025-03-26

## 2025-03-26 RX ADMIN — IOPAMIDOL 75 ML: 755 INJECTION, SOLUTION INTRAVENOUS at 13:19

## 2025-03-26 RX ADMIN — FENTANYL CITRATE 50 MCG: 50 INJECTION, SOLUTION INTRAMUSCULAR; INTRAVENOUS at 12:47

## 2025-03-26 RX ADMIN — SODIUM CHLORIDE 1000 ML: 0.9 INJECTION, SOLUTION INTRAVENOUS at 12:44

## 2025-03-26 RX ADMIN — WATER 1000 MG: 1 INJECTION INTRAMUSCULAR; INTRAVENOUS; SUBCUTANEOUS at 14:58

## 2025-03-26 RX ADMIN — ONDANSETRON 4 MG: 2 INJECTION, SOLUTION INTRAMUSCULAR; INTRAVENOUS at 12:45

## 2025-03-26 RX ADMIN — Medication 80 ML: at 13:20

## 2025-03-26 RX ADMIN — SODIUM CHLORIDE, PRESERVATIVE FREE 10 ML: 5 INJECTION INTRAVENOUS at 13:20

## 2025-03-26 ASSESSMENT — ENCOUNTER SYMPTOMS
COUGH: 0
NAUSEA: 0
SHORTNESS OF BREATH: 0
DIARRHEA: 0
SORE THROAT: 0
RHINORRHEA: 0
VOMITING: 0
BACK PAIN: 1
ABDOMINAL PAIN: 1
EYE PAIN: 0

## 2025-03-26 ASSESSMENT — PAIN - FUNCTIONAL ASSESSMENT: PAIN_FUNCTIONAL_ASSESSMENT: 0-10

## 2025-03-26 ASSESSMENT — PAIN SCALES - GENERAL
PAINLEVEL_OUTOF10: 8
PAINLEVEL_OUTOF10: 8

## 2025-03-26 NOTE — ED PROVIDER NOTES
[facility] Emergency Department  [address]  [facility] [address]  Phone: [phone]  Fax: [phone]    EMERGENCY DEPARTMENT ENCOUNTER          Pt Name: Apurva Francis  MRN: [id number]  Birthdate 1955  Date of evaluation: 3/26/2025      CHIEF COMPLAINT       Chief Complaint   Patient presents with    Abdominal Pain     Midline epigastric down to below the waist line, pt reports it feels like a pulsing pain with each heart beat, had ecoli a couple weeks ago, active chemo with bone marrow stimulant for nonhodgkins lymphoma, also has lower back pain going down both legs        HISTORY OF PRESENT ILLNESS       Apurva Francis is a 69 y.o. female who presents with lower abdominal discomfort and epigastric abdominal discomfort as well.  Fairly recently she was admitted for UTI but the main reason she was admitted was because she had a fever and she is on chemotherapy for non-Hodgkin's lymphoma.  Symptoms began Monday.  She took her second chemotherapeutic treatments on the 17th of this month.  She does typically get bone pain afterwards which is normal for her but states it feels similar to when she had a UTI before.  She denies any fevers or chills.  Denies any dysuria which she did not have the first time either.  No nausea, vomiting or diarrhea at this time.  Did not have any nausea or vomiting anytime recently as well.  No other symptoms or concerns.  She states that opiates do not typically help with her pain and caused her to vomit.  She does have fairly strong opiates at home if needed as well for pain.    REVIEW OF SYSTEMS       Review of Systems   Constitutional:  Negative for chills, fatigue and fever.   HENT:  Negative for rhinorrhea and sore throat.    Eyes:  Negative for pain.   Respiratory:  Negative for cough and shortness of breath.    Cardiovascular:  Negative for chest pain.   Gastrointestinal:  Positive for abdominal pain. Negative for diarrhea, nausea and vomiting.

## 2025-03-26 NOTE — DISCHARGE INSTRUCTIONS
PLEASE RETURN TO THE EMERGENCY DEPARTMENT IMMEDIATELY if your symptoms worsen in anyway or in 1-2 days if not improved for re-evaluation.  You should immediately return to the ER for symptoms such as abdominal or back pain, fever, a feeling of passing out, light headed, dizziness, chest pain, shortness of breath, persistent nausea and/or vomiting, numbness or weakness to the arms or legs, coolness or color change of the arms or legs.      Take your medication as indicated and prescribed.  If you are given an antibiotic then, make sure you get the prescription filled and take the antibiotics until finished.  You should encourage fluids.    Please understand that at this time there is no evidence for a more serious underlying process, but that early in the process of an illness or injury, an emergency department workup can be falsely reassuring.  You should contact your family doctor within the next 24 hours for a follow up appointment    THANK YOU!!!    From [facility] and [facility]    On behalf of the Emergency Department staff at [facility], I would like to thank you for giving us the opportunity to address your health care needs and concerns.    We hope that during your visit, our service was delivered in a professional and caring manner. Please keep [facility] in mind as we walk with you down the path to your own personal wellness.     Please expect an automated text message or email from us so we can ask a few questions about your health and progress. Based on your answers, a clinician may call you back to offer help and instructions.    Please understand that early in the process of an illness or injury, an emergency department workup can be falsely reassuring.  If you notice any worsening, changing or persistent symptoms please call your family doctor or return to the ER immediately.     Tell us how we did during your visit at [email or web address]   and let us know about

## 2025-03-27 ENCOUNTER — CARE COORDINATION (OUTPATIENT)
Dept: CARE COORDINATION | Age: 70
End: 2025-03-27

## 2025-03-27 LAB
MICROORGANISM SPEC CULT: NORMAL
SPECIMEN DESCRIPTION: NORMAL

## 2025-03-27 NOTE — CARE COORDINATION
Care Transitions Note    Follow Up Call     Patient Current Location:  Home: [address] [place] [id number]    Care Transition Nurse contacted the patient by telephone. Verified name and  as identifiers.    Additional needs identified to be addressed with provider   No needs identified                 Method of communication with provider: none.    Care Summary Note: Spoke with patient for ED follow up call. Patient being followed after admission for neutropenic fever, was found to have E. Coli UTI and discharged home on ATB.  She has recently started chemo for non Hodgkins lymphoma and was discouraged that she was having issues after her first chemo treatment.  She was doing well but then this week she started to feel poorly again.  She had her second chemo treatment and the other day said her back was bothering her and she had lower abdominal/pelvic discomfort, stated it felt like her other UTI so came in for evaluation.  She was given dose of IV Rocephin for another UTI, discharged home on ATB again which she is currently taking.  She denies any f/c, n/v today but still feeling fatigued with some back and pelvic discomfort.  Reviewed new ATB and SE today, she is denying any issues currently.  Discharge instructions reviewed, she has new ATB and is taking.  Her next treatment is set for .  She denies any new needs or concerns at this time.     Plan of care updates since last contact:  none       Advance Care Planning:   Does patient have an Advance Directive: reviewed during previous call, see note. .    Medication Review:  Medications changed since last call, reviewed today.     Remote Patient Monitoring:  Offered patient enrollment in the Remote Patient Monitoring (RPM) program for in-home monitoring: Patient is not eligible for RPM program because: patient does not have qualifying diagnosis.    Assessments:  Care Transitions ED Follow Up    Care Transitions Interventions  Schedule Follow Up 
[Negative] : Genitourinary

## 2025-04-03 ENCOUNTER — CARE COORDINATION (OUTPATIENT)
Dept: CARE COORDINATION | Age: 70
End: 2025-04-03

## 2025-04-03 NOTE — CARE COORDINATION
Care Transitions Note    Follow Up Call     Patient Current Location:  Home: [facility] [address] [facility] [address]    LPN Care Coordinator contacted the patient by telephone. Verified name and  as identifiers.    Additional needs identified to be addressed with provider   No needs identified                 Method of communication with provider: none.    Care Summary Note: Writer spoke with Apurva for a follow up care transitions call. She states she is finally feeling better. She has completed her antibiotics and states she is no longer having pelvic or back pain. She is not running a fever and denies having any urinary symptoms. She states her constipation is also a little better. She states when she was having pain she went and got some low dose CBD/THC gummies for her pain which worked well. She has some concerns about her upcoming treatment that she will end up back in the ED again and she will only have one good week a month-she is scheduled for her next treatment on 25. She thanked writer for calling and denies having any other needs or concerns.     Plan of care updates since last contact:  Review of patient management of conditions/medications:         Advance Care Planning:   Does patient have an Advance Directive: reviewed during previous call, see note. .    Medication Review:  Medications changed since last call, reviewed today.     Remote Patient Monitoring:  Offered patient enrollment in the Remote Patient Monitoring (RPM) program for in-home monitoring: Patient is not eligible for RPM program because: patient does not have qualifying diagnosis.    Assessments:  Care Transitions Subsequent and Final Call    Subsequent and Final Calls  Do you have any ongoing symptoms?: No  Have your medications changed?: No  Do you have any questions related to your medications?: No  Do you currently have any active services?: No  Do you have any needs or concerns that I can assist you with?: No  Identified

## 2025-04-06 ENCOUNTER — HOSPITAL ENCOUNTER (OUTPATIENT)
Age: 70
Discharge: HOME | End: 2025-04-06
Payer: MEDICARE

## 2025-04-06 DIAGNOSIS — C83.34 DIFFUSE LARGE B-CELL LYMPHOMA OF LYMPH NODES OF AXILLA (HCC): ICD-10-CM

## 2025-04-06 LAB
ALBUMIN SERPL-MCNC: 3.8 G/DL (ref 3.5–5.2)
ALBUMIN/GLOB SERPL: 1.5 {RATIO} (ref 1–2.5)
ALP SERPL-CCNC: 97 U/L (ref 35–104)
ALT SERPL-CCNC: 19 U/L (ref 5–33)
ANION GAP SERPL CALCULATED.3IONS-SCNC: 8 MMOL/L (ref 9–17)
AST SERPL-CCNC: 28 U/L
BASOPHILS # BLD: 0.1 K/UL (ref 0–0.2)
BASOPHILS NFR BLD: 1 % (ref 0–2)
BILIRUB SERPL-MCNC: 0.3 MG/DL (ref 0.3–1.2)
BUN SERPL-MCNC: 17 MG/DL (ref 8–23)
CALCIUM SERPL-MCNC: 9.2 MG/DL (ref 8.6–10.4)
CHLORIDE SERPL-SCNC: 102 MMOL/L (ref 98–107)
CO2 SERPL-SCNC: 28 MMOL/L (ref 20–31)
CREAT SERPL-MCNC: 0.6 MG/DL (ref 0.5–0.9)
EOSINOPHIL # BLD: 0.1 K/UL (ref 0–0.4)
EOSINOPHILS RELATIVE PERCENT: 1 % (ref 1–4)
ERYTHROCYTE [DISTWIDTH] IN BLOOD BY AUTOMATED COUNT: 18 % (ref 12.5–15.4)
GFR, ESTIMATED: >90 ML/MIN/1.73M2
GLUCOSE SERPL-MCNC: 167 MG/DL (ref 70–99)
HCT VFR BLD AUTO: 31.7 % (ref 36–46)
HGB BLD-MCNC: 11.3 G/DL (ref 12–16)
LYMPHOCYTES NFR BLD: 0.6 K/UL (ref 1–4.8)
LYMPHOCYTES RELATIVE PERCENT: 7 % (ref 24–44)
MCH RBC QN AUTO: 30.6 PG (ref 26–34)
MCHC RBC AUTO-ENTMCNC: 35.5 G/DL (ref 31–37)
MCV RBC AUTO: 86.1 FL (ref 80–100)
MONOCYTES NFR BLD: 0.9 K/UL (ref 0.1–1.2)
MONOCYTES NFR BLD: 10 % (ref 2–11)
NEUTROPHILS NFR BLD: 81 % (ref 36–66)
NEUTS SEG NFR BLD: 7.5 K/UL (ref 1.8–7.7)
PLATELET # BLD AUTO: 338 K/UL (ref 140–450)
PMV BLD AUTO: 7 FL (ref 6–12)
POTASSIUM SERPL-SCNC: 4 MMOL/L (ref 3.7–5.3)
PROT SERPL-MCNC: 6.3 G/DL (ref 6.4–8.3)
RBC # BLD AUTO: 3.69 M/UL (ref 4–5.2)
SODIUM SERPL-SCNC: 138 MMOL/L (ref 135–144)
WBC OTHER # BLD: 9.2 K/UL (ref 3.5–11)

## 2025-04-06 PROCEDURE — 36415 COLL VENOUS BLD VENIPUNCTURE: CPT

## 2025-04-06 PROCEDURE — 85025 COMPLETE CBC W/AUTO DIFF WBC: CPT

## 2025-04-06 PROCEDURE — 80053 COMPREHEN METABOLIC PANEL: CPT

## 2025-04-07 ENCOUNTER — OFFICE VISIT (OUTPATIENT)
Dept: ONCOLOGY | Age: 70
End: 2025-04-07
Payer: MEDICARE

## 2025-04-07 ENCOUNTER — CLINICAL DOCUMENTATION (OUTPATIENT)
Dept: ONCOLOGY | Age: 70
End: 2025-04-07

## 2025-04-07 ENCOUNTER — HOSPITAL ENCOUNTER (OUTPATIENT)
Dept: INFUSION THERAPY | Age: 70
Discharge: HOME | End: 2025-04-07
Payer: MEDICARE

## 2025-04-07 VITALS
SYSTOLIC BLOOD PRESSURE: 132 MMHG | RESPIRATION RATE: 16 BRPM | BODY MASS INDEX: 25.16 KG/M2 | HEIGHT: 64 IN | WEIGHT: 147.4 LBS | OXYGEN SATURATION: 97 % | DIASTOLIC BLOOD PRESSURE: 77 MMHG | HEART RATE: 89 BPM

## 2025-04-07 DIAGNOSIS — C83.34 DIFFUSE LARGE B-CELL LYMPHOMA OF LYMPH NODES OF AXILLA (HCC): Primary | ICD-10-CM

## 2025-04-07 DIAGNOSIS — R59.0 LYMPHADENOPATHY, AXILLARY: ICD-10-CM

## 2025-04-07 PROCEDURE — 3078F DIAST BP <80 MM HG: CPT | Performed by: INTERNAL MEDICINE

## 2025-04-07 PROCEDURE — 36593 DECLOT VASCULAR DEVICE: CPT

## 2025-04-07 PROCEDURE — 6370000000 HC RX 637 (ALT 250 FOR IP): Performed by: INTERNAL MEDICINE

## 2025-04-07 PROCEDURE — 96417 CHEMO IV INFUS EACH ADDL SEQ: CPT

## 2025-04-07 PROCEDURE — 96415 CHEMO IV INFUSION ADDL HR: CPT

## 2025-04-07 PROCEDURE — G8399 PT W/DXA RESULTS DOCUMENT: HCPCS | Performed by: INTERNAL MEDICINE

## 2025-04-07 PROCEDURE — 1090F PRES/ABSN URINE INCON ASSESS: CPT | Performed by: INTERNAL MEDICINE

## 2025-04-07 PROCEDURE — 99214 OFFICE O/P EST MOD 30 MIN: CPT | Performed by: INTERNAL MEDICINE

## 2025-04-07 PROCEDURE — G8419 CALC BMI OUT NRM PARAM NOF/U: HCPCS | Performed by: INTERNAL MEDICINE

## 2025-04-07 PROCEDURE — 3017F COLORECTAL CA SCREEN DOC REV: CPT | Performed by: INTERNAL MEDICINE

## 2025-04-07 PROCEDURE — 99211 OFF/OP EST MAY X REQ PHY/QHP: CPT | Performed by: INTERNAL MEDICINE

## 2025-04-07 PROCEDURE — 1036F TOBACCO NON-USER: CPT | Performed by: INTERNAL MEDICINE

## 2025-04-07 PROCEDURE — G8427 DOCREV CUR MEDS BY ELIG CLIN: HCPCS | Performed by: INTERNAL MEDICINE

## 2025-04-07 PROCEDURE — 96411 CHEMO IV PUSH ADDL DRUG: CPT

## 2025-04-07 PROCEDURE — 6360000002 HC RX W HCPCS: Performed by: INTERNAL MEDICINE

## 2025-04-07 PROCEDURE — 1126F AMNT PAIN NOTED NONE PRSNT: CPT | Performed by: INTERNAL MEDICINE

## 2025-04-07 PROCEDURE — 2500000003 HC RX 250 WO HCPCS: Performed by: INTERNAL MEDICINE

## 2025-04-07 PROCEDURE — 2580000003 HC RX 258: Performed by: INTERNAL MEDICINE

## 2025-04-07 PROCEDURE — 3075F SYST BP GE 130 - 139MM HG: CPT | Performed by: INTERNAL MEDICINE

## 2025-04-07 PROCEDURE — 96361 HYDRATE IV INFUSION ADD-ON: CPT

## 2025-04-07 PROCEDURE — 96413 CHEMO IV INFUSION 1 HR: CPT

## 2025-04-07 PROCEDURE — 96367 TX/PROPH/DG ADDL SEQ IV INF: CPT

## 2025-04-07 PROCEDURE — 1123F ACP DISCUSS/DSCN MKR DOCD: CPT | Performed by: INTERNAL MEDICINE

## 2025-04-07 PROCEDURE — 96375 TX/PRO/DX INJ NEW DRUG ADDON: CPT

## 2025-04-07 PROCEDURE — 1159F MED LIST DOCD IN RCRD: CPT | Performed by: INTERNAL MEDICINE

## 2025-04-07 RX ORDER — SODIUM CHLORIDE 9 MG/ML
5-250 INJECTION, SOLUTION INTRAVENOUS PRN
Status: DISCONTINUED | OUTPATIENT
Start: 2025-04-07 | End: 2025-04-08 | Stop reason: HOSPADM

## 2025-04-07 RX ORDER — DOXORUBICIN HYDROCHLORIDE 2 MG/ML
50 INJECTION, SOLUTION INTRAVENOUS ONCE
Status: CANCELLED | OUTPATIENT
Start: 2025-04-07 | End: 2025-04-07

## 2025-04-07 RX ORDER — ACETAMINOPHEN 325 MG/1
650 TABLET ORAL ONCE
Status: CANCELLED | OUTPATIENT
Start: 2025-04-07 | End: 2025-04-07

## 2025-04-07 RX ORDER — DIPHENHYDRAMINE HYDROCHLORIDE 50 MG/ML
50 INJECTION, SOLUTION INTRAMUSCULAR; INTRAVENOUS ONCE
Status: COMPLETED | OUTPATIENT
Start: 2025-04-07 | End: 2025-04-07

## 2025-04-07 RX ORDER — ONDANSETRON 2 MG/ML
8 INJECTION INTRAMUSCULAR; INTRAVENOUS
Status: CANCELLED | OUTPATIENT
Start: 2025-04-07

## 2025-04-07 RX ORDER — FAMOTIDINE 10 MG/ML
20 INJECTION, SOLUTION INTRAVENOUS
Status: CANCELLED | OUTPATIENT
Start: 2025-04-07

## 2025-04-07 RX ORDER — EPINEPHRINE 1 MG/ML
0.3 INJECTION, SOLUTION, CONCENTRATE INTRAVENOUS PRN
Status: CANCELLED | OUTPATIENT
Start: 2025-04-07

## 2025-04-07 RX ORDER — DIPHENHYDRAMINE HYDROCHLORIDE 50 MG/ML
50 INJECTION, SOLUTION INTRAMUSCULAR; INTRAVENOUS
Status: CANCELLED | OUTPATIENT
Start: 2025-04-07

## 2025-04-07 RX ORDER — HYDROCORTISONE SODIUM SUCCINATE 100 MG/2ML
100 INJECTION INTRAMUSCULAR; INTRAVENOUS
Status: CANCELLED | OUTPATIENT
Start: 2025-04-07

## 2025-04-07 RX ORDER — ACETAMINOPHEN 325 MG/1
650 TABLET ORAL
Status: CANCELLED | OUTPATIENT
Start: 2025-04-07

## 2025-04-07 RX ORDER — HEPARIN 100 UNIT/ML
500 SYRINGE INTRAVENOUS PRN
Status: DISCONTINUED | OUTPATIENT
Start: 2025-04-07 | End: 2025-04-08 | Stop reason: HOSPADM

## 2025-04-07 RX ORDER — PREDNISONE 50 MG/1
50 TABLET ORAL DAILY
COMMUNITY
Start: 2025-03-17

## 2025-04-07 RX ORDER — PROCHLORPERAZINE EDISYLATE 5 MG/ML
5 INJECTION INTRAMUSCULAR; INTRAVENOUS
Status: CANCELLED | OUTPATIENT
Start: 2025-04-07

## 2025-04-07 RX ORDER — PALONOSETRON 0.05 MG/ML
0.25 INJECTION, SOLUTION INTRAVENOUS ONCE
Status: CANCELLED | OUTPATIENT
Start: 2025-04-07 | End: 2025-04-07

## 2025-04-07 RX ORDER — SODIUM CHLORIDE 9 MG/ML
5-250 INJECTION, SOLUTION INTRAVENOUS PRN
Status: CANCELLED | OUTPATIENT
Start: 2025-04-07

## 2025-04-07 RX ORDER — MEPERIDINE HYDROCHLORIDE 50 MG/ML
12.5 INJECTION INTRAMUSCULAR; INTRAVENOUS; SUBCUTANEOUS PRN
Status: CANCELLED | OUTPATIENT
Start: 2025-04-07

## 2025-04-07 RX ORDER — PALONOSETRON 0.05 MG/ML
0.25 INJECTION, SOLUTION INTRAVENOUS ONCE
Status: COMPLETED | OUTPATIENT
Start: 2025-04-07 | End: 2025-04-07

## 2025-04-07 RX ORDER — DOXORUBICIN HYDROCHLORIDE 2 MG/ML
50 INJECTION, SOLUTION INTRAVENOUS ONCE
Status: COMPLETED | OUTPATIENT
Start: 2025-04-07 | End: 2025-04-07

## 2025-04-07 RX ORDER — SODIUM CHLORIDE 0.9 % (FLUSH) 0.9 %
5-40 SYRINGE (ML) INJECTION PRN
Status: DISCONTINUED | OUTPATIENT
Start: 2025-04-07 | End: 2025-04-08 | Stop reason: HOSPADM

## 2025-04-07 RX ORDER — SODIUM CHLORIDE 9 MG/ML
INJECTION, SOLUTION INTRAVENOUS CONTINUOUS
Status: CANCELLED | OUTPATIENT
Start: 2025-04-07

## 2025-04-07 RX ORDER — DIPHENHYDRAMINE HYDROCHLORIDE 50 MG/ML
50 INJECTION, SOLUTION INTRAMUSCULAR; INTRAVENOUS ONCE
Status: CANCELLED | OUTPATIENT
Start: 2025-04-07 | End: 2025-04-07

## 2025-04-07 RX ORDER — HEPARIN SODIUM (PORCINE) LOCK FLUSH IV SOLN 100 UNIT/ML 100 UNIT/ML
500 SOLUTION INTRAVENOUS PRN
Status: CANCELLED | OUTPATIENT
Start: 2025-04-07

## 2025-04-07 RX ORDER — CIPROFLOXACIN 500 MG/1
500 TABLET, FILM COATED ORAL 2 TIMES DAILY
Qty: 14 TABLET | Refills: 0 | Status: SHIPPED | OUTPATIENT
Start: 2025-04-07 | End: 2025-04-14

## 2025-04-07 RX ORDER — ALBUTEROL SULFATE 90 UG/1
4 INHALANT RESPIRATORY (INHALATION) PRN
Status: CANCELLED | OUTPATIENT
Start: 2025-04-07

## 2025-04-07 RX ORDER — ACETAMINOPHEN 325 MG/1
650 TABLET ORAL ONCE
Status: COMPLETED | OUTPATIENT
Start: 2025-04-07 | End: 2025-04-07

## 2025-04-07 RX ORDER — SODIUM CHLORIDE 0.9 % (FLUSH) 0.9 %
5-40 SYRINGE (ML) INJECTION PRN
Status: CANCELLED | OUTPATIENT
Start: 2025-04-07

## 2025-04-07 RX ADMIN — SODIUM CHLORIDE 150 MG: 0.9 INJECTION, SOLUTION INTRAVENOUS at 11:15

## 2025-04-07 RX ADMIN — HEPARIN 500 UNITS: 100 SYRINGE at 14:42

## 2025-04-07 RX ADMIN — VINCRISTINE SULFATE 2 MG: 1 INJECTION, SOLUTION INTRAVENOUS at 14:29

## 2025-04-07 RX ADMIN — CYCLOPHOSPHAMIDE 1320 MG: 200 INJECTION, SOLUTION INTRAVENOUS at 13:29

## 2025-04-07 RX ADMIN — ACETAMINOPHEN 650 MG: 325 TABLET ORAL at 11:09

## 2025-04-07 RX ADMIN — SODIUM CHLORIDE 200 ML/HR: 0.9 INJECTION, SOLUTION INTRAVENOUS at 11:05

## 2025-04-07 RX ADMIN — SODIUM CHLORIDE 700 MG: 9 INJECTION, SOLUTION INTRAVENOUS at 11:45

## 2025-04-07 RX ADMIN — PALONOSETRON 0.25 MG: 0.05 INJECTION, SOLUTION INTRAVENOUS at 11:06

## 2025-04-07 RX ADMIN — DIPHENHYDRAMINE HYDROCHLORIDE 50 MG: 50 INJECTION INTRAMUSCULAR; INTRAVENOUS at 11:06

## 2025-04-07 RX ADMIN — ALTEPLASE 2 MG: 2.2 INJECTION, POWDER, LYOPHILIZED, FOR SOLUTION INTRAVENOUS at 10:00

## 2025-04-07 RX ADMIN — SODIUM CHLORIDE, PRESERVATIVE FREE 10 ML: 5 INJECTION INTRAVENOUS at 14:42

## 2025-04-07 RX ADMIN — DOXORUBICIN HYDROCHLORIDE 88 MG: 2 INJECTION, SOLUTION INTRAVENOUS at 14:16

## 2025-04-07 NOTE — PROGRESS NOTES
in mood or affect.    Hematologic: No history of bleeding tendency. No bruises or ecchymosis. No history of clotting problems.  Infectious disease: No fever, chills or frequent infections.   Endocrine: No polydipsia or polyuria. No temperature intolerance.  Neurologic: No headaches or dizziness. No weakness or numbness of the extremities. No changes in balance, coordination,  memory, mentation, behavior.   Allergic/Immunologic: No nasal congestion or hives. No repeated infections.       PHYSICAL EXAM:  The patient is not in acute distress.  Vital signs: Blood pressure 132/77, pulse 89, resp. rate 16, height 1.626 m (5' 4\"), weight 66.9 kg (147 lb 6.4 oz), SpO2 97%, not currently breastfeeding.     General appearance - well appearing, not in pain or distress  Mental status - good mood, alert and oriented  Eyes - pupils equal and reactive, extraocular eye movements intact  Ears - bilateral TM's and external ear canals normal  Nose - normal and patent, no erythema, discharge or polyps  Mouth - mucous membranes moist, pharynx normal without lesions  Neck - supple, no significant adenopathy  Lymphatics -palpable multiple right axillary lymph nodes with the largest measuring about 5 x 5 cm in the right breast tail area. Smaller today.   Chest - clear to auscultation, no wheezes, rales or rhonchi, symmetric air entry  Heart - normal rate, regular rhythm, normal S1, S2, no murmurs, rubs, clicks or gallops  Abdomen - soft, nontender, nondistended, no masses or organomegaly  Neurological - alert, oriented, normal speech, no focal findings or movement disorder noted  Musculoskeletal - no joint tenderness, deformity or swelling  Extremities - peripheral pulses normal, no pedal edema, no clubbing or cyanosis  Skin - normal coloration and turgor, no rashes, no suspicious skin lesions noted     Review of Diagnostic data:   Lab Results   Component Value Date    WBC 9.2 04/06/2025    HGB 11.3 (L) 04/06/2025    HCT 31.7 (L)

## 2025-04-07 NOTE — PROGRESS NOTES
Patient arrives ambulatory for R-CHOP C3D1  Pt denies complaints or concerns  Pt seen by Dr Ferrer , Orders to proceed with tx  Labs drawn on 4/6 and reviewed, within normal limits for tx  RN unable to get brisk blood return when med-port accessed.  Cathflo instilled for 45min and brisk blood return noted  Blood return verified pre - during - and post Joseph and Vincristine infusions  Patient tolerated tx without incident and discharged in stable condition  Next appointment 8/8 Alicja

## 2025-04-07 NOTE — PROGRESS NOTES
[facility]- MEDICAL NUTRITION THERAPY     Visit Type: Initial  Reason for Assessment: Positive Nutrition Screen At time of screen patient had reported poor appetite/intake, early satiety, and feeling not my normal self, but able to be up and about with fairly normal activities     NUTRITION RECOMMENDATIONS / MONITORING / EVALUATION  Encouraged a well-balanced diet as tolerated. Discussed diet with patient.   Will continue to monitor PO tolerance/intake, weight, and care plans. Review/discuss diet with patient as needed.     Subjective/Current Data:  Apurva Francis is a 69 y.o. female with High grade Diffuse large B-cell NHL, on chemotherapy.   Writer met with patient during infusion for nutrition evaluation. Pt reports she has early satiety is from being on Mounjaro, which patient started a few months ago to help with weight loss and diabetes (prior to being diagnosed with cancer). Pt reports she otherwise tolerates diet /eats well. Pt with many questions on foods to avoid with cancer. Pt states she typically eats a healthy diet, but has now been craving foods/snacks that are more processed/sweet. Writer encouraged a well-balanced diet as able and to allow, but limit, processed/high sugar foods. Pt reports she was/is trying to take a holistic approach, but is now finding the recommendations on taking several supplements to be a lot and very time consuming so pt has lessened amount. Writer discussed general recommendation for using caution with herbal supplements while on chemo d/t unknown interaction with treatment. Pt also reports c/o of constipation recently; states she has Miralax at home to use as needed. Writer discussed consuming adequate fiber but to ensure adequate fluid intake as well.   Weight record reviewed: noted weight of 167 lb on 10/16/24, then down to as low as 138 lb by 3/2/25. Pt relates weight loss to a combination of starting Mounjaro, losing spouse in Dec 24, and cancer diagnosis.

## 2025-04-08 ENCOUNTER — HOSPITAL ENCOUNTER (OUTPATIENT)
Dept: INFUSION THERAPY | Age: 70
Discharge: HOME | End: 2025-04-08
Payer: MEDICARE

## 2025-04-08 DIAGNOSIS — C83.34 DIFFUSE LARGE B-CELL LYMPHOMA OF LYMPH NODES OF AXILLA (HCC): Primary | ICD-10-CM

## 2025-04-08 PROCEDURE — 6360000002 HC RX W HCPCS: Performed by: INTERNAL MEDICINE

## 2025-04-08 PROCEDURE — 96372 THER/PROPH/DIAG INJ SC/IM: CPT

## 2025-04-08 RX ADMIN — PEGFILGRASTIM-JMDB 6 MG: 6 INJECTION SUBCUTANEOUS at 08:37

## 2025-04-10 ENCOUNTER — CARE COORDINATION (OUTPATIENT)
Dept: CARE COORDINATION | Age: 70
End: 2025-04-10

## 2025-04-10 NOTE — CARE COORDINATION
and Final Call    Schedule Follow Up Appointment with PCP: Completed  Subsequent and Final Calls  Do you have any ongoing symptoms?: Yes  Onset of Patient-reported symptoms: Yesterday  Patient-reported symptoms: Constipation  Have your medications changed?: No  Do you have any questions related to your medications?: No  Do you currently have any active services?: No  Do you have any needs or concerns that I can assist you with?: No  Identified Barriers: Lack of Education  Care Transitions Interventions  Other Interventions:              Upcoming Appointments:    Future Appointments         Provider Specialty Dept Phone    4/22/2025 8:30 AM (Arrive by 8:00 AM) [place] INJECTION ROOM Radiology [phone]    4/22/2025 9:30 AM (Arrive by 9:00 AM) [place] [facility] RM 2; [place] PET/CT ROOM Radiology [phone]    4/28/2025 8:45 AM Finesse Baeza MD Oncology [phone]    4/28/2025 9:00 AM [facility] PB MED ONC CHAIR 02 Infusion Therapy [phone]    4/29/2025 10:30 AM [facility] PB MED ONC FAST TRACK CHAIR 1 Infusion Therapy [phone]    8/18/2025 10:30 AM Mandi Orozco APRN - CNP Primary Care [phone]            Patient has agreed to contact primary care provider and/or specialist for any further questions, concerns, or needs.    Rand Guerra RN

## 2025-04-14 ENCOUNTER — HOSPITAL ENCOUNTER (EMERGENCY)
Age: 70
Discharge: HOME | End: 2025-04-14
Attending: EMERGENCY MEDICINE
Payer: MEDICARE

## 2025-04-14 VITALS
TEMPERATURE: 97.5 F | SYSTOLIC BLOOD PRESSURE: 144 MMHG | WEIGHT: 147 LBS | HEART RATE: 90 BPM | HEIGHT: 64 IN | OXYGEN SATURATION: 96 % | BODY MASS INDEX: 25.1 KG/M2 | DIASTOLIC BLOOD PRESSURE: 89 MMHG | RESPIRATION RATE: 17 BRPM

## 2025-04-14 DIAGNOSIS — R10.33 PERIUMBILICAL ABDOMINAL PAIN: Primary | ICD-10-CM

## 2025-04-14 DIAGNOSIS — C83.34 DIFFUSE LARGE B-CELL LYMPHOMA OF LYMPH NODES OF AXILLA (HCC): ICD-10-CM

## 2025-04-14 LAB
BILIRUB UR QL STRIP: NEGATIVE
CLARITY UR: CLEAR
COLOR UR: YELLOW
COMMENT: ABNORMAL
GLUCOSE UR STRIP-MCNC: NEGATIVE MG/DL
HGB UR QL STRIP.AUTO: NEGATIVE
KETONES UR STRIP-MCNC: ABNORMAL MG/DL
LEUKOCYTE ESTERASE UR QL STRIP: NEGATIVE
NITRITE UR QL STRIP: NEGATIVE
PH UR STRIP: 6.5 [PH] (ref 5–8)
PROT UR STRIP-MCNC: NEGATIVE MG/DL
SP GR UR STRIP: 1.01 (ref 1–1.03)
UROBILINOGEN UR STRIP-ACNC: NORMAL EU/DL (ref 0–1)

## 2025-04-14 PROCEDURE — 99283 EMERGENCY DEPT VISIT LOW MDM: CPT | Performed by: EMERGENCY MEDICINE

## 2025-04-14 PROCEDURE — 81003 URINALYSIS AUTO W/O SCOPE: CPT

## 2025-04-14 ASSESSMENT — PAIN SCALES - GENERAL: PAINLEVEL_OUTOF10: 8

## 2025-04-14 ASSESSMENT — PAIN - FUNCTIONAL ASSESSMENT: PAIN_FUNCTIONAL_ASSESSMENT: 0-10

## 2025-04-14 NOTE — ED PROVIDER NOTES
[facility] Emergency Department  [address]  [place] [address]  Phone: [phone]  Fax: [phone]      Patient Name:  Apurva Francis  Medical Record Number:  [id number]  YOB: 1955  Date of Service:  2025  Primary Care Physician:  Mandi Orozco APRN - CNP      CHIEF COMPLAINT:     No chief complaint on file.      HISTORY OF PRESENT ILLNESS:   Apurva Francis is a 69 y.o. female who presents with the complaint of concern for UTI.  The patient reports that she has diffuse large B-cell lymphoma and is currently getting chemotherapy.  She states that the last 2 times she had chemotherapy she developed a UTI afterwards.  She states that she typically develops gradual onset, constant, progressive, dull, achy, nonradiating, periumbilical pain and nausea but no vomiting.  She states that she had her last chemotherapy infusion on 2025 and last night developed gradual onset, constant, progressive, periumbilical abdominal pain and nausea.  She has not taken any medications for her symptoms and does not list any provoking or palliating factors.  Her PCP did prescribe her Cipro to take prophylactically before her most recent chemotherapy treatment but the patient was too afraid to take it after reading about the side effects.  She states that her   in 2024 and she lives alone.  She is also afraid to take her prescribed Vicodin.  The patient denies fever, chills, headache, vision changes, neck pain, back pain, chest pain, shortness of breath, dysuria, hematuria, difficulty urinating, bowel symptoms, focal weakness, numbness, tingling, recent injury or illness.  Based on chart review, the Urine culture from 3/2/25 was positive for E coli and sensitive to most antibiotics.  The urine culture from 3/26/25 was negative.    CURRENT MEDICATIONS:      Previous Medications    ALLOPURINOL (ZYLOPRIM) 300 MG TABLET    Take 1 tablet by mouth daily    ALPHA LIPOIC ACID

## 2025-04-22 ENCOUNTER — HOSPITAL ENCOUNTER (OUTPATIENT)
Dept: NUCLEAR MEDICINE | Age: 70
Discharge: HOME | End: 2025-04-24
Attending: INTERNAL MEDICINE
Payer: MEDICARE

## 2025-04-22 DIAGNOSIS — R59.0 LYMPHADENOPATHY, AXILLARY: ICD-10-CM

## 2025-04-22 DIAGNOSIS — C83.34 DIFFUSE LARGE B-CELL LYMPHOMA OF LYMPH NODES OF AXILLA (HCC): ICD-10-CM

## 2025-04-22 LAB — GLUCOSE BLD-MCNC: 133 MG/DL (ref 65–105)

## 2025-04-22 PROCEDURE — 3430000000 HC RX DIAGNOSTIC RADIOPHARMACEUTICAL: Performed by: INTERNAL MEDICINE

## 2025-04-22 PROCEDURE — 82947 ASSAY GLUCOSE BLOOD QUANT: CPT

## 2025-04-22 PROCEDURE — A9609 HC RX DIAGNOSTIC RADIOPHARMACEUTICAL: HCPCS | Performed by: INTERNAL MEDICINE

## 2025-04-22 PROCEDURE — 2500000003 HC RX 250 WO HCPCS: Performed by: INTERNAL MEDICINE

## 2025-04-22 PROCEDURE — 78815 PET IMAGE W/CT SKULL-THIGH: CPT

## 2025-04-22 RX ORDER — SODIUM CHLORIDE 0.9 % (FLUSH) 0.9 %
10 SYRINGE (ML) INJECTION PRN
Status: DISCONTINUED | OUTPATIENT
Start: 2025-04-22 | End: 2025-04-25 | Stop reason: HOSPADM

## 2025-04-22 RX ORDER — FLUDEOXYGLUCOSE F 18 200 MCI/ML
10 INJECTION, SOLUTION INTRAVENOUS
Status: COMPLETED | OUTPATIENT
Start: 2025-04-22 | End: 2025-04-22

## 2025-04-22 RX ADMIN — FLUDEOXYGLUCOSE F 18 10.73 MILLICURIE: 200 INJECTION, SOLUTION INTRAVENOUS at 08:22

## 2025-04-22 RX ADMIN — SODIUM CHLORIDE, PRESERVATIVE FREE 10 ML: 5 INJECTION INTRAVENOUS at 08:22

## 2025-04-27 ENCOUNTER — HOSPITAL ENCOUNTER (OUTPATIENT)
Age: 70
Discharge: HOME | End: 2025-04-27
Payer: MEDICARE

## 2025-04-27 DIAGNOSIS — C83.34 DIFFUSE LARGE B-CELL LYMPHOMA OF LYMPH NODES OF AXILLA (HCC): ICD-10-CM

## 2025-04-27 LAB
ALBUMIN SERPL-MCNC: 4 G/DL (ref 3.5–5.2)
ALBUMIN/GLOB SERPL: 1.6 {RATIO} (ref 1–2.5)
ALP SERPL-CCNC: 84 U/L (ref 35–104)
ALT SERPL-CCNC: 14 U/L (ref 5–33)
ANION GAP SERPL CALCULATED.3IONS-SCNC: 11 MMOL/L (ref 9–17)
AST SERPL-CCNC: 27 U/L
BASOPHILS # BLD: 0.15 K/UL (ref 0–0.2)
BASOPHILS NFR BLD: 2 % (ref 0–2)
BILIRUB SERPL-MCNC: 0.3 MG/DL (ref 0.3–1.2)
BUN SERPL-MCNC: 18 MG/DL (ref 8–23)
CALCIUM SERPL-MCNC: 9.3 MG/DL (ref 8.6–10.4)
CHLORIDE SERPL-SCNC: 105 MMOL/L (ref 98–107)
CO2 SERPL-SCNC: 26 MMOL/L (ref 20–31)
CREAT SERPL-MCNC: 0.6 MG/DL (ref 0.5–0.9)
EOSINOPHIL # BLD: 0 K/UL (ref 0–0.4)
EOSINOPHILS RELATIVE PERCENT: 0 % (ref 1–4)
ERYTHROCYTE [DISTWIDTH] IN BLOOD BY AUTOMATED COUNT: 20.8 % (ref 12.5–15.4)
GFR, ESTIMATED: >90 ML/MIN/1.73M2
GLUCOSE SERPL-MCNC: 98 MG/DL (ref 70–99)
HCT VFR BLD AUTO: 30.9 % (ref 36–46)
HGB BLD-MCNC: 10.8 G/DL (ref 12–16)
LYMPHOCYTES NFR BLD: 0.53 K/UL (ref 1–4.8)
LYMPHOCYTES RELATIVE PERCENT: 7 % (ref 24–44)
MCH RBC QN AUTO: 32 PG (ref 26–34)
MCHC RBC AUTO-ENTMCNC: 34.8 G/DL (ref 31–37)
MCV RBC AUTO: 91.8 FL (ref 80–100)
MONOCYTES NFR BLD: 0.75 K/UL (ref 0.1–0.8)
MONOCYTES NFR BLD: 10 % (ref 1–7)
MORPHOLOGY: ABNORMAL
NEUTROPHILS NFR BLD: 81 % (ref 36–66)
NEUTS SEG NFR BLD: 6.07 K/UL (ref 1.8–7.7)
PLATELET # BLD AUTO: 314 K/UL (ref 140–450)
PMV BLD AUTO: 6.8 FL (ref 6–12)
POTASSIUM SERPL-SCNC: 4.5 MMOL/L (ref 3.7–5.3)
PROT SERPL-MCNC: 6.5 G/DL (ref 6.4–8.3)
RBC # BLD AUTO: 3.37 M/UL (ref 4–5.2)
SODIUM SERPL-SCNC: 142 MMOL/L (ref 135–144)
WBC OTHER # BLD: 7.5 K/UL (ref 3.5–11)

## 2025-04-27 PROCEDURE — 85025 COMPLETE CBC W/AUTO DIFF WBC: CPT

## 2025-04-27 PROCEDURE — 36415 COLL VENOUS BLD VENIPUNCTURE: CPT

## 2025-04-27 PROCEDURE — 80053 COMPREHEN METABOLIC PANEL: CPT

## 2025-04-28 ENCOUNTER — OFFICE VISIT (OUTPATIENT)
Dept: ONCOLOGY | Age: 70
End: 2025-04-28
Payer: MEDICARE

## 2025-04-28 ENCOUNTER — HOSPITAL ENCOUNTER (OUTPATIENT)
Dept: INFUSION THERAPY | Age: 70
Discharge: HOME | End: 2025-04-28
Payer: MEDICARE

## 2025-04-28 VITALS
HEART RATE: 89 BPM | SYSTOLIC BLOOD PRESSURE: 114 MMHG | TEMPERATURE: 97 F | DIASTOLIC BLOOD PRESSURE: 79 MMHG | BODY MASS INDEX: 24.44 KG/M2 | WEIGHT: 142.4 LBS | OXYGEN SATURATION: 96 % | RESPIRATION RATE: 18 BRPM

## 2025-04-28 DIAGNOSIS — C83.34 DIFFUSE LARGE B-CELL LYMPHOMA OF LYMPH NODES OF AXILLA (HCC): Primary | ICD-10-CM

## 2025-04-28 DIAGNOSIS — C83.34 DIFFUSE LARGE B-CELL LYMPHOMA OF LYMPH NODES OF AXILLA (HCC): ICD-10-CM

## 2025-04-28 PROCEDURE — 1126F AMNT PAIN NOTED NONE PRSNT: CPT | Performed by: INTERNAL MEDICINE

## 2025-04-28 PROCEDURE — 1159F MED LIST DOCD IN RCRD: CPT | Performed by: INTERNAL MEDICINE

## 2025-04-28 PROCEDURE — 1036F TOBACCO NON-USER: CPT | Performed by: INTERNAL MEDICINE

## 2025-04-28 PROCEDURE — 2580000003 HC RX 258: Performed by: INTERNAL MEDICINE

## 2025-04-28 PROCEDURE — 96375 TX/PRO/DX INJ NEW DRUG ADDON: CPT

## 2025-04-28 PROCEDURE — 96367 TX/PROPH/DG ADDL SEQ IV INF: CPT

## 2025-04-28 PROCEDURE — 3074F SYST BP LT 130 MM HG: CPT | Performed by: INTERNAL MEDICINE

## 2025-04-28 PROCEDURE — G8420 CALC BMI NORM PARAMETERS: HCPCS | Performed by: INTERNAL MEDICINE

## 2025-04-28 PROCEDURE — 1123F ACP DISCUSS/DSCN MKR DOCD: CPT | Performed by: INTERNAL MEDICINE

## 2025-04-28 PROCEDURE — 6370000000 HC RX 637 (ALT 250 FOR IP): Performed by: INTERNAL MEDICINE

## 2025-04-28 PROCEDURE — 1090F PRES/ABSN URINE INCON ASSESS: CPT | Performed by: INTERNAL MEDICINE

## 2025-04-28 PROCEDURE — G8427 DOCREV CUR MEDS BY ELIG CLIN: HCPCS | Performed by: INTERNAL MEDICINE

## 2025-04-28 PROCEDURE — G8399 PT W/DXA RESULTS DOCUMENT: HCPCS | Performed by: INTERNAL MEDICINE

## 2025-04-28 PROCEDURE — 99211 OFF/OP EST MAY X REQ PHY/QHP: CPT | Performed by: INTERNAL MEDICINE

## 2025-04-28 PROCEDURE — 3017F COLORECTAL CA SCREEN DOC REV: CPT | Performed by: INTERNAL MEDICINE

## 2025-04-28 PROCEDURE — 96415 CHEMO IV INFUSION ADDL HR: CPT

## 2025-04-28 PROCEDURE — 6360000002 HC RX W HCPCS: Performed by: INTERNAL MEDICINE

## 2025-04-28 PROCEDURE — 96413 CHEMO IV INFUSION 1 HR: CPT

## 2025-04-28 PROCEDURE — 96417 CHEMO IV INFUS EACH ADDL SEQ: CPT

## 2025-04-28 PROCEDURE — 99214 OFFICE O/P EST MOD 30 MIN: CPT | Performed by: INTERNAL MEDICINE

## 2025-04-28 PROCEDURE — 3078F DIAST BP <80 MM HG: CPT | Performed by: INTERNAL MEDICINE

## 2025-04-28 PROCEDURE — 96411 CHEMO IV PUSH ADDL DRUG: CPT

## 2025-04-28 PROCEDURE — 2500000003 HC RX 250 WO HCPCS: Performed by: INTERNAL MEDICINE

## 2025-04-28 RX ORDER — HEPARIN SODIUM (PORCINE) LOCK FLUSH IV SOLN 100 UNIT/ML 100 UNIT/ML
500 SOLUTION INTRAVENOUS PRN
Status: CANCELLED | OUTPATIENT
Start: 2025-04-28

## 2025-04-28 RX ORDER — ACETAMINOPHEN 325 MG/1
650 TABLET ORAL ONCE
Status: COMPLETED | OUTPATIENT
Start: 2025-04-28 | End: 2025-04-28

## 2025-04-28 RX ORDER — EPINEPHRINE 1 MG/ML
0.3 INJECTION, SOLUTION, CONCENTRATE INTRAVENOUS PRN
Status: CANCELLED | OUTPATIENT
Start: 2025-04-28

## 2025-04-28 RX ORDER — SODIUM CHLORIDE 9 MG/ML
INJECTION, SOLUTION INTRAVENOUS CONTINUOUS
Status: CANCELLED | OUTPATIENT
Start: 2025-04-28

## 2025-04-28 RX ORDER — ALBUTEROL SULFATE 90 UG/1
4 INHALANT RESPIRATORY (INHALATION) PRN
Status: CANCELLED | OUTPATIENT
Start: 2025-04-28

## 2025-04-28 RX ORDER — ACETAMINOPHEN 325 MG/1
650 TABLET ORAL
Status: CANCELLED | OUTPATIENT
Start: 2025-04-28

## 2025-04-28 RX ORDER — PROCHLORPERAZINE EDISYLATE 5 MG/ML
5 INJECTION INTRAMUSCULAR; INTRAVENOUS
Status: CANCELLED | OUTPATIENT
Start: 2025-04-28

## 2025-04-28 RX ORDER — SODIUM CHLORIDE 9 MG/ML
5-250 INJECTION, SOLUTION INTRAVENOUS PRN
Status: CANCELLED | OUTPATIENT
Start: 2025-04-28

## 2025-04-28 RX ORDER — ONDANSETRON 2 MG/ML
8 INJECTION INTRAMUSCULAR; INTRAVENOUS
Status: CANCELLED | OUTPATIENT
Start: 2025-04-28

## 2025-04-28 RX ORDER — DIPHENHYDRAMINE HYDROCHLORIDE 50 MG/ML
50 INJECTION, SOLUTION INTRAMUSCULAR; INTRAVENOUS
Status: CANCELLED | OUTPATIENT
Start: 2025-04-28

## 2025-04-28 RX ORDER — HEPARIN 100 UNIT/ML
500 SYRINGE INTRAVENOUS PRN
Status: DISCONTINUED | OUTPATIENT
Start: 2025-04-28 | End: 2025-04-29 | Stop reason: HOSPADM

## 2025-04-28 RX ORDER — FAMOTIDINE 10 MG/ML
20 INJECTION, SOLUTION INTRAVENOUS
Status: CANCELLED | OUTPATIENT
Start: 2025-04-28

## 2025-04-28 RX ORDER — MEPERIDINE HYDROCHLORIDE 50 MG/ML
12.5 INJECTION INTRAMUSCULAR; INTRAVENOUS; SUBCUTANEOUS PRN
Status: CANCELLED | OUTPATIENT
Start: 2025-04-28

## 2025-04-28 RX ORDER — DOXORUBICIN HYDROCHLORIDE 2 MG/ML
50 INJECTION, SOLUTION INTRAVENOUS ONCE
Status: CANCELLED | OUTPATIENT
Start: 2025-04-28 | End: 2025-04-28

## 2025-04-28 RX ORDER — SODIUM CHLORIDE 9 MG/ML
5-250 INJECTION, SOLUTION INTRAVENOUS PRN
Status: DISCONTINUED | OUTPATIENT
Start: 2025-04-28 | End: 2025-04-29 | Stop reason: HOSPADM

## 2025-04-28 RX ORDER — SODIUM CHLORIDE 0.9 % (FLUSH) 0.9 %
5-40 SYRINGE (ML) INJECTION PRN
Status: DISCONTINUED | OUTPATIENT
Start: 2025-04-28 | End: 2025-04-29 | Stop reason: HOSPADM

## 2025-04-28 RX ORDER — PALONOSETRON 0.05 MG/ML
0.25 INJECTION, SOLUTION INTRAVENOUS ONCE
Status: CANCELLED | OUTPATIENT
Start: 2025-04-28 | End: 2025-04-28

## 2025-04-28 RX ORDER — SODIUM CHLORIDE 0.9 % (FLUSH) 0.9 %
5-40 SYRINGE (ML) INJECTION PRN
Status: CANCELLED | OUTPATIENT
Start: 2025-04-28

## 2025-04-28 RX ORDER — DIPHENHYDRAMINE HYDROCHLORIDE 50 MG/ML
50 INJECTION, SOLUTION INTRAMUSCULAR; INTRAVENOUS ONCE
Status: COMPLETED | OUTPATIENT
Start: 2025-04-28 | End: 2025-04-28

## 2025-04-28 RX ORDER — DIPHENHYDRAMINE HYDROCHLORIDE 50 MG/ML
50 INJECTION, SOLUTION INTRAMUSCULAR; INTRAVENOUS ONCE
Status: CANCELLED | OUTPATIENT
Start: 2025-04-28 | End: 2025-04-28

## 2025-04-28 RX ORDER — HYDROCORTISONE SODIUM SUCCINATE 100 MG/2ML
100 INJECTION INTRAMUSCULAR; INTRAVENOUS
Status: CANCELLED | OUTPATIENT
Start: 2025-04-28

## 2025-04-28 RX ORDER — PALONOSETRON 0.05 MG/ML
0.25 INJECTION, SOLUTION INTRAVENOUS ONCE
Status: COMPLETED | OUTPATIENT
Start: 2025-04-28 | End: 2025-04-28

## 2025-04-28 RX ORDER — DOXORUBICIN HYDROCHLORIDE 2 MG/ML
50 INJECTION, SOLUTION INTRAVENOUS ONCE
Status: COMPLETED | OUTPATIENT
Start: 2025-04-28 | End: 2025-04-28

## 2025-04-28 RX ORDER — ACETAMINOPHEN 325 MG/1
650 TABLET ORAL ONCE
Status: CANCELLED | OUTPATIENT
Start: 2025-04-28 | End: 2025-04-28

## 2025-04-28 RX ADMIN — SODIUM CHLORIDE 150 MG: 0.9 INJECTION, SOLUTION INTRAVENOUS at 10:11

## 2025-04-28 RX ADMIN — PALONOSETRON 0.25 MG: 0.05 INJECTION, SOLUTION INTRAVENOUS at 09:57

## 2025-04-28 RX ADMIN — VINCRISTINE SULFATE 2 MG: 1 INJECTION, SOLUTION INTRAVENOUS at 13:32

## 2025-04-28 RX ADMIN — CYCLOPHOSPHAMIDE 1320 MG: 200 INJECTION, SOLUTION INTRAVENOUS at 12:53

## 2025-04-28 RX ADMIN — DOXORUBICIN HYDROCHLORIDE 88 MG: 2 INJECTION, SOLUTION INTRAVENOUS at 12:41

## 2025-04-28 RX ADMIN — HEPARIN 500 UNITS: 100 SYRINGE at 13:45

## 2025-04-28 RX ADMIN — ACETAMINOPHEN 650 MG: 325 TABLET ORAL at 09:57

## 2025-04-28 RX ADMIN — SODIUM CHLORIDE 700 MG: 9 INJECTION, SOLUTION INTRAVENOUS at 10:59

## 2025-04-28 RX ADMIN — SODIUM CHLORIDE 200 ML/HR: 0.9 INJECTION, SOLUTION INTRAVENOUS at 09:57

## 2025-04-28 RX ADMIN — SODIUM CHLORIDE, PRESERVATIVE FREE 10 ML: 5 INJECTION INTRAVENOUS at 13:45

## 2025-04-28 RX ADMIN — DIPHENHYDRAMINE HYDROCHLORIDE 50 MG: 50 INJECTION INTRAMUSCULAR; INTRAVENOUS at 09:57

## 2025-04-28 NOTE — PROGRESS NOTES
Patient arrives ambulatory for R_CHOP C4D1  Pt denies complaints or concerns  Pt seen by Dr Ferrer , Orders to proceed with tx  Labs drawn via med port and reviewed, within normal limits for tx  Patient tolerated tx without incident and discharged in stable condition  Next appointment 4/29 fulphilia injection

## 2025-04-29 ENCOUNTER — HOSPITAL ENCOUNTER (OUTPATIENT)
Dept: INFUSION THERAPY | Age: 70
Discharge: HOME | End: 2025-04-29
Payer: MEDICARE

## 2025-04-29 VITALS — TEMPERATURE: 97.9 F

## 2025-04-29 DIAGNOSIS — C83.34 DIFFUSE LARGE B-CELL LYMPHOMA OF LYMPH NODES OF AXILLA (HCC): Primary | ICD-10-CM

## 2025-04-29 PROCEDURE — 96372 THER/PROPH/DIAG INJ SC/IM: CPT

## 2025-04-29 PROCEDURE — 6360000002 HC RX W HCPCS: Performed by: INTERNAL MEDICINE

## 2025-04-29 RX ADMIN — PEGFILGRASTIM-JMDB 6 MG: 6 INJECTION SUBCUTANEOUS at 10:28

## 2025-04-29 NOTE — PROGRESS NOTES
Patient here C4D2 Fulphila injection  Arrives ambulatory.  Denies any new complaints.  Injection given without incident  Patient discharged in stable condition.  Returns 5/19/25 for MD visit and C5D1

## 2025-04-29 NOTE — PROGRESS NOTES
_  Chief Complaint   Patient presents with    Follow-up     Review status of disease     Other     Occasional abdominal pain (hx of diverticulitis)     Results     PET     DIAGNOSIS:        High grade Diffuse large B-cell NHL. Negative for MYC rearrangement.   Extensive right axillary lymphadenopathy.   CURRENT THERAPY:         Work up in progress  Started R-CHOP 2/24/25  BRIEF CASE HISTORY:      Ms. Apurva Francis is a very pleasant 69 y.o. female with history of multiple co morbidities as listed.  Patient is referred for evaluation and further management of axillary lymphadenopathy.  Patient states she felt a lump in the right axillary area October 2024.  Lump was consistent with a enlarging lymph node.  She had no trauma or skin infections.  Recently she had some discomfort and mild pain in the axillary lymph node area.  She denies any lumps or bumps in the breast.  No skin infections or cellulitis.  No fever or night sweats.  No other areas of lymphadenopathy.  No weight loss or decreased appetite.  The patient had diagnostic mammogram and ultrasound and she was found to have several enlarged lymph nodes in the right axilla with the largest measuring 4.9 cm.  Patient scheduled for lymph node biopsy January 16, 2024.  The previous mammogram from June 2023 was negative.  Patient denies smoking or alcohol drinking.   INTERIM HISTORY:    Seen for follow up NHL. She had axillary LN biopsy. Results as above. Clinically continues to have rt axillary LN enlargement despite 3 cycles of chemotherapy.  PET/CT scan showed no increased activity  No fever or night sweats. No weight loss.          PAST MEDICAL HISTORY: has a past medical history of Diabetes mellitus (HCC), Fibromyalgia, GERD (gastroesophageal reflux disease), and Lymphoma (HCC).    PAST SURGICAL HISTORY: has a past surgical history that includes Carpal tunnel release; Ulnar

## 2025-05-07 ENCOUNTER — TELEPHONE (OUTPATIENT)
Age: 70
End: 2025-05-07

## 2025-05-07 ENCOUNTER — HOSPITAL ENCOUNTER (OUTPATIENT)
Age: 70
Setting detail: SPECIMEN
Discharge: HOME | End: 2025-05-07

## 2025-05-07 DIAGNOSIS — C83.34 DIFFUSE LARGE B-CELL LYMPHOMA OF LYMPH NODES OF AXILLA (HCC): Primary | ICD-10-CM

## 2025-05-07 DIAGNOSIS — C83.34 DIFFUSE LARGE B-CELL LYMPHOMA OF LYMPH NODES OF AXILLA (HCC): ICD-10-CM

## 2025-05-07 LAB
ALBUMIN SERPL-MCNC: 3.7 G/DL (ref 3.5–5.2)
ALBUMIN/GLOB SERPL: 1.5 {RATIO} (ref 1–2.5)
ALP SERPL-CCNC: 89 U/L (ref 35–104)
ALT SERPL-CCNC: 21 U/L (ref 10–35)
ANION GAP SERPL CALCULATED.3IONS-SCNC: 11 MMOL/L (ref 9–16)
AST SERPL-CCNC: 25 U/L (ref 10–35)
BASOPHILS # BLD: 0.04 K/UL (ref 0–0.2)
BASOPHILS NFR BLD: 1 % (ref 0–2)
BILIRUB SERPL-MCNC: 0.4 MG/DL (ref 0–1.2)
BUN SERPL-MCNC: 12 MG/DL (ref 8–23)
CALCIUM SERPL-MCNC: 9.4 MG/DL (ref 8.6–10.4)
CHLORIDE SERPL-SCNC: 103 MMOL/L (ref 98–107)
CO2 SERPL-SCNC: 27 MMOL/L (ref 20–31)
CREAT SERPL-MCNC: 0.6 MG/DL (ref 0.6–0.9)
EOSINOPHIL # BLD: 0.04 K/UL (ref 0–0.44)
EOSINOPHILS RELATIVE PERCENT: 1 % (ref 1–4)
ERYTHROCYTE [DISTWIDTH] IN BLOOD BY AUTOMATED COUNT: 15.8 % (ref 11.8–14.4)
GFR, ESTIMATED: >90 ML/MIN/1.73M2
GLUCOSE SERPL-MCNC: 112 MG/DL (ref 74–99)
HCT VFR BLD AUTO: 27.6 % (ref 36.3–47.1)
HGB BLD-MCNC: 9.4 G/DL (ref 11.9–15.1)
IMM GRANULOCYTES # BLD AUTO: 0.07 K/UL (ref 0–0.3)
IMM GRANULOCYTES NFR BLD: 2 %
LYMPHOCYTES NFR BLD: 0.46 K/UL (ref 1.1–3.7)
LYMPHOCYTES RELATIVE PERCENT: 13 % (ref 24–43)
MCH RBC QN AUTO: 31.6 PG (ref 25.2–33.5)
MCHC RBC AUTO-ENTMCNC: 34.1 G/DL (ref 28.4–34.8)
MCV RBC AUTO: 92.9 FL (ref 82.6–102.9)
MONOCYTES NFR BLD: 0.56 K/UL (ref 0.1–1.2)
MONOCYTES NFR BLD: 16 % (ref 3–12)
MORPHOLOGY: ABNORMAL
MORPHOLOGY: ABNORMAL
NEUTROPHILS NFR BLD: 67 % (ref 36–65)
NEUTS SEG NFR BLD: 2.33 K/UL (ref 1.5–8.1)
NRBC BLD-RTO: 0 PER 100 WBC
PLATELET # BLD AUTO: 75 K/UL (ref 138–453)
PMV BLD AUTO: 12.3 FL (ref 8.1–13.5)
POTASSIUM SERPL-SCNC: 4.2 MMOL/L (ref 3.7–5.3)
PROT SERPL-MCNC: 6.1 G/DL (ref 6.6–8.7)
RBC # BLD AUTO: 2.97 M/UL (ref 3.95–5.11)
SODIUM SERPL-SCNC: 141 MMOL/L (ref 136–145)
WBC OTHER # BLD: 3.5 K/UL (ref 3.5–11.3)

## 2025-05-07 NOTE — TELEPHONE ENCOUNTER
Name: Apurva Francis  : 1955  MRN: [id number]    Oncology Navigation Follow-Up Note    Contact Type:  Telephone    Notes: Writer spoke with pt who reports she is doing \"not too bad.\" She does report pain is improving and is no longer needing pain medications. She does report extreme shortness of breath with activity and fatigue. Pt stated she gets winded just from walking across her bedroom. Denied fevers,cough or chest pains during her shortness of breath. She does feel like her heart rate increases but no palpitations. Writer updated triage nurses of pts concerns. Instructed to have pt come in for blood work. Pt was updated. Discussed that in future pt to contact triage for symptoms from treatment.     Pt also voiced concerns about \"slipping into a depression\" She recently lost her  and his experiencing a lot of heartache in addition to her cancer diagnosis. We discussed support options and pt is interested in counseling. A referral will be placed.       Electronically signed by Susan Tellez RN on 2025 at 10:46 AM

## 2025-05-09 ENCOUNTER — HOSPITAL ENCOUNTER (EMERGENCY)
Age: 70
Discharge: HOME | End: 2025-05-09
Attending: STUDENT IN AN ORGANIZED HEALTH CARE EDUCATION/TRAINING PROGRAM
Payer: MEDICARE

## 2025-05-09 ENCOUNTER — APPOINTMENT (OUTPATIENT)
Dept: CT IMAGING | Age: 70
End: 2025-05-09
Payer: MEDICARE

## 2025-05-09 ENCOUNTER — TELEPHONE (OUTPATIENT)
Dept: INFUSION THERAPY | Age: 70
End: 2025-05-09

## 2025-05-09 ENCOUNTER — TELEPHONE (OUTPATIENT)
Age: 70
End: 2025-05-09

## 2025-05-09 VITALS
HEART RATE: 91 BPM | BODY MASS INDEX: 23.9 KG/M2 | DIASTOLIC BLOOD PRESSURE: 72 MMHG | SYSTOLIC BLOOD PRESSURE: 135 MMHG | WEIGHT: 140 LBS | RESPIRATION RATE: 11 BRPM | OXYGEN SATURATION: 96 % | HEIGHT: 64 IN | TEMPERATURE: 99 F

## 2025-05-09 DIAGNOSIS — R06.00 DYSPNEA, UNSPECIFIED TYPE: Primary | ICD-10-CM

## 2025-05-09 LAB
ANION GAP SERPL CALCULATED.3IONS-SCNC: 13 MMOL/L (ref 9–16)
BASOPHILS # BLD: 0 K/UL (ref 0–0.2)
BASOPHILS NFR BLD: 0 % (ref 0–2)
BILIRUB UR QL STRIP: NEGATIVE
BNP SERPL-MCNC: 98 PG/ML (ref 0–125)
BUN SERPL-MCNC: 11 MG/DL (ref 8–23)
CALCIUM SERPL-MCNC: 9.5 MG/DL (ref 8.6–10.4)
CHLORIDE SERPL-SCNC: 102 MMOL/L (ref 98–107)
CLARITY UR: CLEAR
CO2 SERPL-SCNC: 24 MMOL/L (ref 20–31)
COLOR UR: YELLOW
COMMENT: NORMAL
CREAT SERPL-MCNC: 0.6 MG/DL (ref 0.6–0.9)
EOSINOPHIL # BLD: 0 K/UL (ref 0–0.4)
EOSINOPHILS RELATIVE PERCENT: 0 % (ref 1–4)
ERYTHROCYTE [DISTWIDTH] IN BLOOD BY AUTOMATED COUNT: 17.4 % (ref 12.5–15.4)
GFR, ESTIMATED: >90 ML/MIN/1.73M2
GLUCOSE SERPL-MCNC: 161 MG/DL (ref 74–99)
GLUCOSE UR STRIP-MCNC: NEGATIVE MG/DL
HCT VFR BLD AUTO: 28.4 % (ref 36–46)
HGB BLD-MCNC: 9.8 G/DL (ref 12–16)
HGB UR QL STRIP.AUTO: NEGATIVE
KETONES UR STRIP-MCNC: NEGATIVE MG/DL
LEUKOCYTE ESTERASE UR QL STRIP: NEGATIVE
LYMPHOCYTES NFR BLD: 0.78 K/UL (ref 1–4.8)
LYMPHOCYTES RELATIVE PERCENT: 9 % (ref 24–44)
MAGNESIUM SERPL-MCNC: 1.7 MG/DL (ref 1.6–2.4)
MCH RBC QN AUTO: 31.6 PG (ref 26–34)
MCHC RBC AUTO-ENTMCNC: 34.6 G/DL (ref 31–37)
MCV RBC AUTO: 91.5 FL (ref 80–100)
METAMYELOCYTES ABSOLUTE COUNT: 0.17 K/UL
METAMYELOCYTES: 2 %
MONOCYTES NFR BLD: 0.7 K/UL (ref 0.1–0.8)
MONOCYTES NFR BLD: 8 % (ref 1–7)
MORPHOLOGY: ABNORMAL
NEUTROPHILS NFR BLD: 81 % (ref 36–66)
NEUTS SEG NFR BLD: 7.05 K/UL (ref 1.8–7.7)
NITRITE UR QL STRIP: NEGATIVE
PH UR STRIP: 7.5 [PH] (ref 5–8)
PLATELET # BLD AUTO: 144 K/UL (ref 140–450)
PMV BLD AUTO: 9 FL (ref 6–12)
POTASSIUM SERPL-SCNC: 3.7 MMOL/L (ref 3.7–5.3)
PROT UR STRIP-MCNC: NEGATIVE MG/DL
RBC # BLD AUTO: 3.1 M/UL (ref 4–5.2)
SODIUM SERPL-SCNC: 139 MMOL/L (ref 136–145)
SP GR UR STRIP: 1.01 (ref 1–1.03)
TROPONIN I SERPL HS-MCNC: 14 NG/L (ref 0–14)
UROBILINOGEN UR STRIP-ACNC: NORMAL EU/DL (ref 0–1)
WBC OTHER # BLD: 8.7 K/UL (ref 3.5–11)

## 2025-05-09 PROCEDURE — 6360000004 HC RX CONTRAST MEDICATION: Performed by: NURSE PRACTITIONER

## 2025-05-09 PROCEDURE — 84484 ASSAY OF TROPONIN QUANT: CPT

## 2025-05-09 PROCEDURE — 83735 ASSAY OF MAGNESIUM: CPT

## 2025-05-09 PROCEDURE — 2500000003 HC RX 250 WO HCPCS: Performed by: NURSE PRACTITIONER

## 2025-05-09 PROCEDURE — 99285 EMERGENCY DEPT VISIT HI MDM: CPT | Performed by: STUDENT IN AN ORGANIZED HEALTH CARE EDUCATION/TRAINING PROGRAM

## 2025-05-09 PROCEDURE — 93005 ELECTROCARDIOGRAM TRACING: CPT | Performed by: STUDENT IN AN ORGANIZED HEALTH CARE EDUCATION/TRAINING PROGRAM

## 2025-05-09 PROCEDURE — 85025 COMPLETE CBC W/AUTO DIFF WBC: CPT

## 2025-05-09 PROCEDURE — 81003 URINALYSIS AUTO W/O SCOPE: CPT

## 2025-05-09 PROCEDURE — 71260 CT THORAX DX C+: CPT

## 2025-05-09 PROCEDURE — 83880 ASSAY OF NATRIURETIC PEPTIDE: CPT

## 2025-05-09 PROCEDURE — 80048 BASIC METABOLIC PNL TOTAL CA: CPT

## 2025-05-09 RX ORDER — 0.9 % SODIUM CHLORIDE 0.9 %
80 INTRAVENOUS SOLUTION INTRAVENOUS ONCE
Status: DISCONTINUED | OUTPATIENT
Start: 2025-05-09 | End: 2025-05-09 | Stop reason: HOSPADM

## 2025-05-09 RX ORDER — SODIUM CHLORIDE 0.9 % (FLUSH) 0.9 %
10 SYRINGE (ML) INJECTION
Status: COMPLETED | OUTPATIENT
Start: 2025-05-09 | End: 2025-05-09

## 2025-05-09 RX ORDER — IOPAMIDOL 755 MG/ML
75 INJECTION, SOLUTION INTRAVASCULAR
Status: COMPLETED | OUTPATIENT
Start: 2025-05-09 | End: 2025-05-09

## 2025-05-09 RX ADMIN — IOPAMIDOL 75 ML: 755 INJECTION, SOLUTION INTRAVENOUS at 17:01

## 2025-05-09 RX ADMIN — SODIUM CHLORIDE, PRESERVATIVE FREE 10 ML: 5 INJECTION INTRAVENOUS at 17:01

## 2025-05-09 RX ADMIN — Medication 80 ML: at 17:01

## 2025-05-09 ASSESSMENT — PAIN - FUNCTIONAL ASSESSMENT: PAIN_FUNCTIONAL_ASSESSMENT: NONE - DENIES PAIN

## 2025-05-09 NOTE — DISCHARGE INSTRUCTIONS
We evaluated you today for shortness of breath.  We did not find any pulmonary embolism.  Your hemoglobin was stable at 9.8.  Your heart enzymes were not elevated.  No signs of infection at this time.  Please follow closely with your oncology team and your family doctor.  Return to the ER for any worsening symptoms, chest pain, shortness of breath, coughing, coughing up blood, fevers or any other concerns peer

## 2025-05-09 NOTE — PROGRESS NOTES
Spiritual Health History and Assessment/Progress Note  [facility]    (P) Spiritual/Emotional Needs,  ,  ,      Name: Apurva Francis MRN: [id number]    Age: 69 y.o.     Sex: female   Language: English   Alevism: Jain   <principal problem not specified>     Date: 5/9/2025            Total Time Calculated: (P) 10 min              Spiritual Assessment continued in [facility] EMERGENCY DEPARTMENT        Referral/Consult From: (P) Rounding   Encounter Overview/Reason: (P) Spiritual/Emotional Needs  Service Provided For: (P) Patient  Patient shared the reason she came to the ED. Pt reported the positive developments, including being \"cancer free.\" Pt shared how her mental health has been impacted by her grief and illness and the steps she has taken to address her concerns. Pt expressed gratitude for the spiritual support and insights she received. Pt is hopeful about sharing her gifts in the future. Pt acknowledged challenges and possibilities due to side effects and voiced hopes for a positive outcome. Pt asked writer to keep sending her positive thoughts.  Writer offered supportive presence and active listening. Writer inquired about Pt's coping and needs. Writer affirmed Pt's strengths. Writer celebrated Pt's gifts and positive news. Writer assured Pt of her positive thoughts and support.     Lela, Belief, Meaning:   Patient has beliefs or practices that help with coping during difficult times  Family/Friends No family/friends present      Importance and Influence:  Patient has no beliefs influential to healthcare decision-making identified during this visit  Family/Friends No family/friends present    Community:  Patient feels well-supported. Support system includes: Friends and Other: Spiritual Community  Family/Friends No family/friends present    Assessment and Plan of Care:     Patient Interventions include: Facilitated expression of thoughts and feelings, Explored spiritual

## 2025-05-09 NOTE — TELEPHONE ENCOUNTER
Name: Apurva Francis  : 1955  MRN: [id number]    Oncology Navigation Follow-Up Note    Contact Type:  Telephone    Notes: Writer spoke with pt who reports her shortness of breath is the same. Having difficulty walking across a room without being winded. She describes SOB as \"severe\" Dr Coleman updated, recent labs reviewed. Dr Coleman is concerned pt may have a PE, he would like her to present to the ER for further evaluation. Writer updated pt on this. Pt stated she will go to a [facility] ER today       Electronically signed by Susan Tellez RN on 2025 at 11:26 AM

## 2025-05-09 NOTE — ED PROVIDER NOTES
[facility] Emergency Department  [address]  [place] [address]  Phone: [phone]  Fax: [phone]      Attending Physician Attestation    I performed a history and physical examination of the patient and discussed management with the mid level provider. I reviewed the mid level provider's note and agree with the documented findings and plan of care. Any areas of disagreement are noted on the chart. I was personally present for the key portions of any procedures. I have documented in the chart those procedures where I was not present during the key portions. I have reviewed the emergency nurses triage note. I agree with the chief complaint, past medical history, past surgical history, allergies, medications, social and family history as documented unless otherwise noted below. Documentation of the HPI, Physical Exam and Medical Decision Making performed by mid level providers is based on my personal performance of the HPI, PE and MDM. For Physician Assistant/ Nurse Practitioner cases/documentation I have personally evaluated this patient and have completed at least one if not all key elements of the E/M (history, physical exam, and MDM). Additional findings are as noted.      Chief Complaint   Patient presents with    Shortness of Breath     On going for the past couple of weeks, worse with movement.       INITIAL VITALS:  height is 1.626 m (5' 4\") and weight is 63.5 kg (140 lb). Her oral temperature is 99 °F (37.2 °C). Her blood pressure is 135/72 and her pulse is 91. Her respiration is 11 and oxygen saturation is 96%.       DIAGNOSTIC RESULTS       RADIOLOGY:   Non-plain film images such as CT, Ultrasound and MRI are read by the radiologist. Plain radiographic images are visualized and the radiologist interpretations are reviewed as follows:     CT CHEST PULMONARY EMBOLISM W CONTRAST   Final Result   No evidence of pulmonary embolism or acute pulmonary abnormality.       
 [facility] EMERGENCY DEPARTMENT  EMERGENCY DEPARTMENT ENCOUNTER      Pt Name: Apurva Francis  MRN: [id number]  Birthdate 1955  Date of evaluation: 5/9/2025  Provider: DEISY Wilson CNP  6:34 PM    CHIEF COMPLAINT       Chief Complaint   Patient presents with    Shortness of Breath     On going for the past couple of weeks, worse with movement.         HISTORY OF PRESENT ILLNESS    Apurva Francis is a 69 y.o. female who presents to the emergency department for evaluation of shortness of breath.  Patient states that she has had a lot of fatigue and shortness of breath ever since her last chemo which was about a week and a half ago.  She is treats for her non-Hodgkin's lymphoma.  She had labs done with her oncologist after discussing the fatigue and did not have any anemia.  With this, her oncology team called her and asked her to come into the ER for evaluation of a pulmonary embolism.  She has no chest pain.  She has not recently been ill.  She denies any fevers.  No nausea no vomiting no abdominal pain    HPI    Nursing Notes were reviewed.    REVIEW OF SYSTEMS       Review of Systems   All other systems reviewed and are negative.      Except as noted above the remainder of the review of systems was reviewed and negative.       PAST MEDICAL HISTORY     Past Medical History:   Diagnosis Date    Diabetes mellitus (HCC)     Fibromyalgia     GERD (gastroesophageal reflux disease)     Lymphoma (HCC)     right axilla         SURGICAL HISTORY       Past Surgical History:   Procedure Laterality Date    CARPAL TUNNEL RELEASE      FINGER TRIGGER RELEASE Right 12/07/2018    ring    FINGER TRIGGER RELEASE N/A 12/7/2018    RIGHT RING FINGER TRIGGER RELEASE [facility]    IR PORT PLACEMENT > 5 YEARS  2/4/2025    IR PORT PLACEMENT > 5 YEARS 2/4/2025 [facility] SPECIAL PROCEDURES    TUBAL LIGATION      ULNAR TUNNEL RELEASE Bilateral     US BIOPSY LYMPH NODE N/A 
2

## 2025-05-09 NOTE — TELEPHONE ENCOUNTER
Medical clearance from the [facility] was singed and faxed back to [phone] with a confirmation, copy will be put in scan bin to be scanned into chart

## 2025-05-10 LAB
EKG ATRIAL RATE: 95 BPM
EKG P AXIS: 44 DEGREES
EKG P-R INTERVAL: 124 MS
EKG Q-T INTERVAL: 374 MS
EKG QRS DURATION: 72 MS
EKG QTC CALCULATION (BAZETT): 469 MS
EKG R AXIS: 42 DEGREES
EKG T AXIS: 48 DEGREES
EKG VENTRICULAR RATE: 95 BPM

## 2025-05-10 PROCEDURE — 93010 ELECTROCARDIOGRAM REPORT: CPT | Performed by: INTERNAL MEDICINE

## 2025-05-14 ENCOUNTER — TELEPHONE (OUTPATIENT)
Age: 70
End: 2025-05-14

## 2025-05-14 NOTE — TELEPHONE ENCOUNTER
Counseling referral received for patient.  called patient to schedule appointment. Patient requesting to be seen 5/21 at 11:00am.

## 2025-05-18 ENCOUNTER — HOSPITAL ENCOUNTER (OUTPATIENT)
Age: 70
Discharge: HOME | End: 2025-05-18
Payer: MEDICARE

## 2025-05-18 DIAGNOSIS — C83.34 DIFFUSE LARGE B-CELL LYMPHOMA OF LYMPH NODES OF AXILLA (HCC): ICD-10-CM

## 2025-05-18 LAB
ALBUMIN SERPL-MCNC: 3.8 G/DL (ref 3.5–5.2)
ALBUMIN/GLOB SERPL: 1.4 {RATIO} (ref 1–2.5)
ALP SERPL-CCNC: 74 U/L (ref 35–104)
ALT SERPL-CCNC: 10 U/L (ref 10–35)
ANION GAP SERPL CALCULATED.3IONS-SCNC: 11 MMOL/L (ref 9–16)
AST SERPL-CCNC: 29 U/L (ref 10–35)
BASOPHILS # BLD: 0.1 K/UL (ref 0–0.2)
BASOPHILS NFR BLD: 1 % (ref 0–2)
BILIRUB SERPL-MCNC: 0.6 MG/DL (ref 0–1.2)
BUN SERPL-MCNC: 12 MG/DL (ref 8–23)
CALCIUM SERPL-MCNC: 9.7 MG/DL (ref 8.6–10.4)
CHLORIDE SERPL-SCNC: 102 MMOL/L (ref 98–107)
CO2 SERPL-SCNC: 26 MMOL/L (ref 20–31)
CREAT SERPL-MCNC: 0.6 MG/DL (ref 0.6–0.9)
EOSINOPHIL # BLD: 0.1 K/UL (ref 0–0.4)
EOSINOPHILS RELATIVE PERCENT: 1 % (ref 1–4)
ERYTHROCYTE [DISTWIDTH] IN BLOOD BY AUTOMATED COUNT: 15.9 % (ref 12.5–15.4)
GFR, ESTIMATED: >90 ML/MIN/1.73M2
GLUCOSE SERPL-MCNC: 102 MG/DL (ref 74–99)
HCT VFR BLD AUTO: 27.9 % (ref 36–46)
HGB BLD-MCNC: 9.4 G/DL (ref 12–16)
LYMPHOCYTES NFR BLD: 0.5 K/UL (ref 1–4.8)
LYMPHOCYTES RELATIVE PERCENT: 6 % (ref 24–44)
MCH RBC QN AUTO: 30.9 PG (ref 26–34)
MCHC RBC AUTO-ENTMCNC: 33.8 G/DL (ref 31–37)
MCV RBC AUTO: 91.5 FL (ref 80–100)
MONOCYTES NFR BLD: 1 K/UL (ref 0.1–1.2)
MONOCYTES NFR BLD: 12 % (ref 2–11)
NEUTROPHILS NFR BLD: 80 % (ref 36–66)
NEUTS SEG NFR BLD: 6.5 K/UL (ref 1.8–7.7)
PLATELET # BLD AUTO: 459 K/UL (ref 140–450)
PMV BLD AUTO: 7.1 FL (ref 6–12)
POTASSIUM SERPL-SCNC: 4.2 MMOL/L (ref 3.7–5.3)
PROT SERPL-MCNC: 6.6 G/DL (ref 6.6–8.7)
RBC # BLD AUTO: 3.06 M/UL (ref 4–5.2)
SODIUM SERPL-SCNC: 139 MMOL/L (ref 136–145)
WBC OTHER # BLD: 8.1 K/UL (ref 3.5–11)

## 2025-05-18 PROCEDURE — 85025 COMPLETE CBC W/AUTO DIFF WBC: CPT

## 2025-05-18 PROCEDURE — 80053 COMPREHEN METABOLIC PANEL: CPT

## 2025-05-18 PROCEDURE — 36415 COLL VENOUS BLD VENIPUNCTURE: CPT

## 2025-05-19 ENCOUNTER — OFFICE VISIT (OUTPATIENT)
Age: 70
End: 2025-05-19
Payer: MEDICARE

## 2025-05-19 ENCOUNTER — HOSPITAL ENCOUNTER (OUTPATIENT)
Dept: INFUSION THERAPY | Age: 70
Discharge: HOME | End: 2025-05-19
Payer: MEDICARE

## 2025-05-19 ENCOUNTER — CLINICAL DOCUMENTATION (OUTPATIENT)
Age: 70
End: 2025-05-19

## 2025-05-19 ENCOUNTER — TELEPHONE (OUTPATIENT)
Age: 70
End: 2025-05-19

## 2025-05-19 VITALS
HEART RATE: 91 BPM | WEIGHT: 137.2 LBS | TEMPERATURE: 96.7 F | OXYGEN SATURATION: 99 % | RESPIRATION RATE: 18 BRPM | DIASTOLIC BLOOD PRESSURE: 66 MMHG | SYSTOLIC BLOOD PRESSURE: 137 MMHG | BODY MASS INDEX: 23.55 KG/M2

## 2025-05-19 DIAGNOSIS — C83.34 DIFFUSE LARGE B-CELL LYMPHOMA OF LYMPH NODES OF AXILLA (HCC): ICD-10-CM

## 2025-05-19 DIAGNOSIS — C83.34 DIFFUSE LARGE B-CELL LYMPHOMA OF LYMPH NODES OF AXILLA (HCC): Primary | ICD-10-CM

## 2025-05-19 PROCEDURE — 1036F TOBACCO NON-USER: CPT | Performed by: INTERNAL MEDICINE

## 2025-05-19 PROCEDURE — 1159F MED LIST DOCD IN RCRD: CPT | Performed by: INTERNAL MEDICINE

## 2025-05-19 PROCEDURE — 96409 CHEMO IV PUSH SNGL DRUG: CPT

## 2025-05-19 PROCEDURE — 3078F DIAST BP <80 MM HG: CPT | Performed by: INTERNAL MEDICINE

## 2025-05-19 PROCEDURE — 96417 CHEMO IV INFUS EACH ADDL SEQ: CPT

## 2025-05-19 PROCEDURE — G8420 CALC BMI NORM PARAMETERS: HCPCS | Performed by: INTERNAL MEDICINE

## 2025-05-19 PROCEDURE — 6370000000 HC RX 637 (ALT 250 FOR IP): Performed by: INTERNAL MEDICINE

## 2025-05-19 PROCEDURE — 2580000003 HC RX 258: Performed by: INTERNAL MEDICINE

## 2025-05-19 PROCEDURE — 99214 OFFICE O/P EST MOD 30 MIN: CPT | Performed by: INTERNAL MEDICINE

## 2025-05-19 PROCEDURE — 96411 CHEMO IV PUSH ADDL DRUG: CPT

## 2025-05-19 PROCEDURE — 96415 CHEMO IV INFUSION ADDL HR: CPT

## 2025-05-19 PROCEDURE — 1090F PRES/ABSN URINE INCON ASSESS: CPT | Performed by: INTERNAL MEDICINE

## 2025-05-19 PROCEDURE — G8399 PT W/DXA RESULTS DOCUMENT: HCPCS | Performed by: INTERNAL MEDICINE

## 2025-05-19 PROCEDURE — 96413 CHEMO IV INFUSION 1 HR: CPT

## 2025-05-19 PROCEDURE — 1126F AMNT PAIN NOTED NONE PRSNT: CPT | Performed by: INTERNAL MEDICINE

## 2025-05-19 PROCEDURE — 2500000003 HC RX 250 WO HCPCS: Performed by: INTERNAL MEDICINE

## 2025-05-19 PROCEDURE — 3075F SYST BP GE 130 - 139MM HG: CPT | Performed by: INTERNAL MEDICINE

## 2025-05-19 PROCEDURE — 6360000002 HC RX W HCPCS: Performed by: INTERNAL MEDICINE

## 2025-05-19 PROCEDURE — 96367 TX/PROPH/DG ADDL SEQ IV INF: CPT

## 2025-05-19 PROCEDURE — 3017F COLORECTAL CA SCREEN DOC REV: CPT | Performed by: INTERNAL MEDICINE

## 2025-05-19 PROCEDURE — 1123F ACP DISCUSS/DSCN MKR DOCD: CPT | Performed by: INTERNAL MEDICINE

## 2025-05-19 PROCEDURE — 96375 TX/PRO/DX INJ NEW DRUG ADDON: CPT

## 2025-05-19 PROCEDURE — G8427 DOCREV CUR MEDS BY ELIG CLIN: HCPCS | Performed by: INTERNAL MEDICINE

## 2025-05-19 RX ORDER — PALONOSETRON 0.05 MG/ML
0.25 INJECTION, SOLUTION INTRAVENOUS ONCE
Status: CANCELLED | OUTPATIENT
Start: 2025-05-19 | End: 2025-05-19

## 2025-05-19 RX ORDER — DIPHENHYDRAMINE HYDROCHLORIDE 50 MG/ML
50 INJECTION, SOLUTION INTRAMUSCULAR; INTRAVENOUS
OUTPATIENT
Start: 2025-06-09

## 2025-05-19 RX ORDER — FAMOTIDINE 10 MG/ML
20 INJECTION, SOLUTION INTRAVENOUS
OUTPATIENT
Start: 2025-06-09

## 2025-05-19 RX ORDER — ONDANSETRON 2 MG/ML
8 INJECTION INTRAMUSCULAR; INTRAVENOUS
Status: CANCELLED | OUTPATIENT
Start: 2025-05-19

## 2025-05-19 RX ORDER — SODIUM CHLORIDE 9 MG/ML
5-250 INJECTION, SOLUTION INTRAVENOUS PRN
Status: CANCELLED | OUTPATIENT
Start: 2025-05-19

## 2025-05-19 RX ORDER — HEPARIN 100 UNIT/ML
500 SYRINGE INTRAVENOUS PRN
Status: DISCONTINUED | OUTPATIENT
Start: 2025-05-19 | End: 2025-05-20 | Stop reason: HOSPADM

## 2025-05-19 RX ORDER — ACETAMINOPHEN 325 MG/1
650 TABLET ORAL ONCE
Status: CANCELLED | OUTPATIENT
Start: 2025-05-19 | End: 2025-05-19

## 2025-05-19 RX ORDER — SODIUM CHLORIDE 0.9 % (FLUSH) 0.9 %
5-40 SYRINGE (ML) INJECTION PRN
OUTPATIENT
Start: 2025-06-09

## 2025-05-19 RX ORDER — PALONOSETRON 0.05 MG/ML
0.25 INJECTION, SOLUTION INTRAVENOUS ONCE
OUTPATIENT
Start: 2025-06-09 | End: 2025-06-09

## 2025-05-19 RX ORDER — ONDANSETRON 2 MG/ML
8 INJECTION INTRAMUSCULAR; INTRAVENOUS
OUTPATIENT
Start: 2025-06-09

## 2025-05-19 RX ORDER — DOXORUBICIN HYDROCHLORIDE 2 MG/ML
50 INJECTION, SOLUTION INTRAVENOUS ONCE
Status: CANCELLED | OUTPATIENT
Start: 2025-05-19 | End: 2025-05-19

## 2025-05-19 RX ORDER — PROCHLORPERAZINE EDISYLATE 5 MG/ML
5 INJECTION INTRAMUSCULAR; INTRAVENOUS
OUTPATIENT
Start: 2025-06-09

## 2025-05-19 RX ORDER — HYDROCORTISONE SODIUM SUCCINATE 100 MG/2ML
100 INJECTION INTRAMUSCULAR; INTRAVENOUS
Status: CANCELLED | OUTPATIENT
Start: 2025-05-19

## 2025-05-19 RX ORDER — SODIUM CHLORIDE 0.9 % (FLUSH) 0.9 %
5-40 SYRINGE (ML) INJECTION PRN
Status: DISCONTINUED | OUTPATIENT
Start: 2025-05-19 | End: 2025-05-20 | Stop reason: HOSPADM

## 2025-05-19 RX ORDER — EPINEPHRINE 1 MG/ML
0.3 INJECTION, SOLUTION, CONCENTRATE INTRAVENOUS PRN
Status: CANCELLED | OUTPATIENT
Start: 2025-05-19

## 2025-05-19 RX ORDER — DIPHENHYDRAMINE HYDROCHLORIDE 50 MG/ML
50 INJECTION, SOLUTION INTRAMUSCULAR; INTRAVENOUS ONCE
OUTPATIENT
Start: 2025-06-09 | End: 2025-06-09

## 2025-05-19 RX ORDER — SODIUM CHLORIDE 9 MG/ML
5-250 INJECTION, SOLUTION INTRAVENOUS PRN
OUTPATIENT
Start: 2025-06-09

## 2025-05-19 RX ORDER — DOXORUBICIN HYDROCHLORIDE 2 MG/ML
50 INJECTION, SOLUTION INTRAVENOUS ONCE
OUTPATIENT
Start: 2025-06-09 | End: 2025-06-09

## 2025-05-19 RX ORDER — SODIUM CHLORIDE 9 MG/ML
INJECTION, SOLUTION INTRAVENOUS CONTINUOUS
Status: CANCELLED | OUTPATIENT
Start: 2025-05-19

## 2025-05-19 RX ORDER — MEPERIDINE HYDROCHLORIDE 50 MG/ML
12.5 INJECTION INTRAMUSCULAR; INTRAVENOUS; SUBCUTANEOUS PRN
OUTPATIENT
Start: 2025-06-09

## 2025-05-19 RX ORDER — PREDNISONE 50 MG/1
50 TABLET ORAL 2 TIMES DAILY
Qty: 20 TABLET | Refills: 0 | Status: SHIPPED | OUTPATIENT
Start: 2025-05-19

## 2025-05-19 RX ORDER — FAMOTIDINE 10 MG/ML
20 INJECTION, SOLUTION INTRAVENOUS
Status: CANCELLED | OUTPATIENT
Start: 2025-05-19

## 2025-05-19 RX ORDER — MEPERIDINE HYDROCHLORIDE 50 MG/ML
12.5 INJECTION INTRAMUSCULAR; INTRAVENOUS; SUBCUTANEOUS PRN
Status: CANCELLED | OUTPATIENT
Start: 2025-05-19

## 2025-05-19 RX ORDER — ACETAMINOPHEN 325 MG/1
650 TABLET ORAL ONCE
Status: COMPLETED | OUTPATIENT
Start: 2025-05-19 | End: 2025-05-19

## 2025-05-19 RX ORDER — DIPHENHYDRAMINE HYDROCHLORIDE 50 MG/ML
50 INJECTION, SOLUTION INTRAMUSCULAR; INTRAVENOUS ONCE
Status: CANCELLED | OUTPATIENT
Start: 2025-05-19 | End: 2025-05-19

## 2025-05-19 RX ORDER — DOXORUBICIN HYDROCHLORIDE 2 MG/ML
50 INJECTION, SOLUTION INTRAVENOUS ONCE
Status: COMPLETED | OUTPATIENT
Start: 2025-05-19 | End: 2025-05-19

## 2025-05-19 RX ORDER — EPINEPHRINE 1 MG/ML
0.3 INJECTION, SOLUTION, CONCENTRATE INTRAVENOUS PRN
OUTPATIENT
Start: 2025-06-09

## 2025-05-19 RX ORDER — HEPARIN SODIUM (PORCINE) LOCK FLUSH IV SOLN 100 UNIT/ML 100 UNIT/ML
500 SOLUTION INTRAVENOUS PRN
OUTPATIENT
Start: 2025-06-09

## 2025-05-19 RX ORDER — SODIUM CHLORIDE 9 MG/ML
5-250 INJECTION, SOLUTION INTRAVENOUS PRN
Status: DISCONTINUED | OUTPATIENT
Start: 2025-05-19 | End: 2025-05-20 | Stop reason: HOSPADM

## 2025-05-19 RX ORDER — ACETAMINOPHEN 325 MG/1
650 TABLET ORAL
Status: CANCELLED | OUTPATIENT
Start: 2025-05-19

## 2025-05-19 RX ORDER — HYDROCORTISONE SODIUM SUCCINATE 100 MG/2ML
100 INJECTION INTRAMUSCULAR; INTRAVENOUS
OUTPATIENT
Start: 2025-06-09

## 2025-05-19 RX ORDER — ALBUTEROL SULFATE 90 UG/1
4 INHALANT RESPIRATORY (INHALATION) PRN
OUTPATIENT
Start: 2025-06-09

## 2025-05-19 RX ORDER — SODIUM CHLORIDE 9 MG/ML
INJECTION, SOLUTION INTRAVENOUS CONTINUOUS
OUTPATIENT
Start: 2025-06-09

## 2025-05-19 RX ORDER — PALONOSETRON 0.05 MG/ML
0.25 INJECTION, SOLUTION INTRAVENOUS ONCE
Status: COMPLETED | OUTPATIENT
Start: 2025-05-19 | End: 2025-05-19

## 2025-05-19 RX ORDER — ALBUTEROL SULFATE 90 UG/1
4 INHALANT RESPIRATORY (INHALATION) PRN
Status: CANCELLED | OUTPATIENT
Start: 2025-05-19

## 2025-05-19 RX ORDER — ACETAMINOPHEN 325 MG/1
650 TABLET ORAL
OUTPATIENT
Start: 2025-06-09

## 2025-05-19 RX ORDER — ACETAMINOPHEN 325 MG/1
650 TABLET ORAL ONCE
OUTPATIENT
Start: 2025-06-09 | End: 2025-06-09

## 2025-05-19 RX ORDER — DIPHENHYDRAMINE HYDROCHLORIDE 50 MG/ML
50 INJECTION, SOLUTION INTRAMUSCULAR; INTRAVENOUS ONCE
Status: COMPLETED | OUTPATIENT
Start: 2025-05-19 | End: 2025-05-19

## 2025-05-19 RX ORDER — PROCHLORPERAZINE EDISYLATE 5 MG/ML
5 INJECTION INTRAMUSCULAR; INTRAVENOUS
Status: CANCELLED | OUTPATIENT
Start: 2025-05-19

## 2025-05-19 RX ORDER — DIPHENHYDRAMINE HYDROCHLORIDE 50 MG/ML
50 INJECTION, SOLUTION INTRAMUSCULAR; INTRAVENOUS
Status: CANCELLED | OUTPATIENT
Start: 2025-05-19

## 2025-05-19 RX ADMIN — CYCLOPHOSPHAMIDE 1320 MG: 200 INJECTION, SOLUTION INTRAVENOUS at 13:48

## 2025-05-19 RX ADMIN — SODIUM CHLORIDE, PRESERVATIVE FREE 10 ML: 5 INJECTION INTRAVENOUS at 08:49

## 2025-05-19 RX ADMIN — ACETAMINOPHEN 650 MG: 325 TABLET ORAL at 10:36

## 2025-05-19 RX ADMIN — DOXORUBICIN HYDROCHLORIDE 88 MG: 2 INJECTION, SOLUTION INTRAVENOUS at 13:23

## 2025-05-19 RX ADMIN — DIPHENHYDRAMINE HYDROCHLORIDE 50 MG: 50 INJECTION INTRAMUSCULAR; INTRAVENOUS at 10:37

## 2025-05-19 RX ADMIN — SODIUM CHLORIDE 150 MG: 9 INJECTION, SOLUTION INTRAVENOUS at 10:47

## 2025-05-19 RX ADMIN — VINCRISTINE SULFATE 2 MG: 1 INJECTION, SOLUTION INTRAVENOUS at 13:30

## 2025-05-19 RX ADMIN — SODIUM CHLORIDE 700 MG: 9 INJECTION, SOLUTION INTRAVENOUS at 11:33

## 2025-05-19 RX ADMIN — SODIUM CHLORIDE, PRESERVATIVE FREE 10 ML: 5 INJECTION INTRAVENOUS at 14:30

## 2025-05-19 RX ADMIN — PALONOSETRON 0.25 MG: 0.05 INJECTION, SOLUTION INTRAVENOUS at 10:44

## 2025-05-19 RX ADMIN — HEPARIN 500 UNITS: 100 SYRINGE at 14:30

## 2025-05-19 RX ADMIN — SODIUM CHLORIDE 200 ML/HR: 0.9 INJECTION, SOLUTION INTRAVENOUS at 10:36

## 2025-05-19 NOTE — PROGRESS NOTES
_  Chief Complaint   Patient presents with    Follow-up     Review status of disease     Other     Fatigue   Neuropathy in fingers      DIAGNOSIS:        High grade Diffuse large B-cell NHL. Negative for MYC rearrangement.   Extensive right axillary lymphadenopathy.   CURRENT THERAPY:         Work up in progress  Started R-CHOP 2/24/25  BRIEF CASE HISTORY:      Ms. Apurva Francis is a very pleasant 69 y.o. female with history of multiple co morbidities as listed.  Patient is referred for evaluation and further management of axillary lymphadenopathy.  Patient states she felt a lump in the right axillary area October 2024.  Lump was consistent with a enlarging lymph node.  She had no trauma or skin infections.  Recently she had some discomfort and mild pain in the axillary lymph node area.  She denies any lumps or bumps in the breast.  No skin infections or cellulitis.  No fever or night sweats.  No other areas of lymphadenopathy.  No weight loss or decreased appetite.  The patient had diagnostic mammogram and ultrasound and she was found to have several enlarged lymph nodes in the right axilla with the largest measuring 4.9 cm.  Patient scheduled for lymph node biopsy January 16, 2024.  The previous mammogram from June 2023 was negative.  Patient denies smoking or alcohol drinking.   INTERIM HISTORY:    Seen for follow up NHL. She had axillary LN biopsy.  No palpable lymph nodes.  .  PET/CT scan showed no increased activity  No fever or night sweats. No weight loss.          PAST MEDICAL HISTORY: has a past medical history of Diabetes mellitus (HCC), Fibromyalgia, GERD (gastroesophageal reflux disease), and Lymphoma (HCC).    PAST SURGICAL HISTORY: has a past surgical history that includes Carpal tunnel release; Ulnar tunnel release (Bilateral); Tubal ligation; Finger trigger release (Right, 12/07/2018); Finger trigger release (N/A,

## 2025-05-19 NOTE — PROGRESS NOTES
Pt here for C.5D.1 R-CHOP  Arrives ambulatory.  Denies any new complaints.  Pt was seen by Dr. Perez, order rec'd to proceed with tx.  Tx complete without incident.  Pt d/c'd in stable condition.  Returns 5/20 for fulphila.

## 2025-05-19 NOTE — TELEPHONE ENCOUNTER
Instructions   from Dr. Finesse Baeza MD    Proceed with treatment as ordered (labs reviewed).   RV 3 weeks         Tx as scheduled  RV 6/9/25 at 8 am with tx to follow

## 2025-05-19 NOTE — PROGRESS NOTES
[facility]- MEDICAL NUTRITION THERAPY     Visit Type:Follow-up    NUTRITION RECOMMENDATIONS / MONITORING / EVALUATION  Encouraged small, frequent, nutrient dense intake as tolerated. Suggested scheduled intake and use of oral nutrition supplements as needed.   Will continue to monitor PO tolerance/intake, weight, and care plans.    Subjective/Current Data:  Apurva Francis is a 69 y.o. female with High grade Diffuse large B-cell NHL, on chemotherapy.   Writer met with patient for nutrition follow-up. Pt c/o ongoing loss of appetite and fatigue, especially after last treatment. Pt states normally appetite improves after a week or so, but did not after last treatment. Pt states taste is also off. Tries to eat small frequent snacks throughout the day vs big meals. Pt reports yesterdays intake included only 1 rib and few bites of key lime pie. States she has tried Premier Protein and Orgain supplements and does not like the aftertaste. Pt willing to make her own protein smoothies at home; states she has protein powder she can add to homemade yogurt, fruits, vegetables. Pt reports she continues to avoid processed foods, but now allowing herself to have some sweets. Pt states weight on home scale is now 134 lb (office scale wt today was 137 lb). Pt believes some wt loss and decreased appetite/intake could also be related to being on Mounjaro.     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

## 2025-05-20 ENCOUNTER — HOSPITAL ENCOUNTER (OUTPATIENT)
Dept: INFUSION THERAPY | Age: 70
Discharge: HOME | End: 2025-05-20
Payer: MEDICARE

## 2025-05-20 DIAGNOSIS — C83.34 DIFFUSE LARGE B-CELL LYMPHOMA OF LYMPH NODES OF AXILLA (HCC): Primary | ICD-10-CM

## 2025-05-20 PROCEDURE — 96372 THER/PROPH/DIAG INJ SC/IM: CPT

## 2025-05-20 PROCEDURE — 6360000002 HC RX W HCPCS: Performed by: INTERNAL MEDICINE

## 2025-05-20 RX ADMIN — PEGFILGRASTIM-JMDB 6 MG: 6 INJECTION SUBCUTANEOUS at 11:39

## 2025-05-20 NOTE — PROGRESS NOTES
Pt arrives ambulatory for Fulphila  Injection  Pt denies complaints or concerns  Injection complete without incident and patient discharged in stable condition  Next appt  6/9 for MD visit and C6D1 (last treatment)

## 2025-05-21 ENCOUNTER — OFFICE VISIT (OUTPATIENT)
Age: 70
End: 2025-05-21
Payer: MEDICARE

## 2025-05-21 DIAGNOSIS — F43.21 ADJUSTMENT DISORDER WITH DEPRESSED MOOD: Primary | ICD-10-CM

## 2025-05-21 PROCEDURE — 1123F ACP DISCUSS/DSCN MKR DOCD: CPT | Performed by: SOCIAL WORKER

## 2025-05-21 PROCEDURE — 1036F TOBACCO NON-USER: CPT | Performed by: SOCIAL WORKER

## 2025-05-21 PROCEDURE — 90837 PSYTX W PT 60 MINUTES: CPT | Performed by: SOCIAL WORKER

## 2025-05-21 NOTE — PROGRESS NOTES
Oncology Psychosocial Diagnostic Assessment  ILDA Denny, OSW-C   5/21/2025  10:54 AM  Apurva Francis  1955  [id number]    Length of session:  62 minutes (10:58-12:00)    Pt was provided informed consent for Oncology Psychosocial Counseling. Discussed with patient model of service to include the limits of confidentiality (i.e. abuse reporting, suicide intervention, etc.) and short-term intervention focused approach.  Pt indicated understanding.    REFERRAL SOURCE:  Susan  Nurse Navigator  CANCER DIAGNOSIS:  Large B Cell Non Hodgkins Lymphoma  ONCOLOGY TREATMENT PLAN (PAST, CURRENT, FUTURE):  Diagnosed in January, finishing infusions in June, has been told she is cured and finishing treatment as scheduled  PRESENTING PROBLEM:  \"Haven't grieved \"  \"Loss of self and control\"  LIVING SITUATION, FAMILY HX AND PERTINENT INFORMATION:  Lives alone with three dogs in home she shared with  for last ten plus years  SOCIAL, PEER SUPPORT, FRIENDSHIPS, MEANINGFUL ACTIVITY, COMMUNITY SUPPORT:  Good support from family and friends, utilizes [employer], has strong belief system and many hobbies/activities she enjoys doing (Reiki master, jewelry making, art classes, etc.). Son and grandson live in town but do not drive so support is limited.   BELIEF SYSTEMS:  Recently \"out the window\" but identifies as Presybeterian and that she is on her own spiritual journey, no specific Advent/denomination. Strong connection to energies, sounds, vibrations. Reiki master training. \"Things happen for a reason.\" Believes her aleks should have better prepared her for recent events.   LOSS, TRAUMA PAST & PRESENT (ACES in flowsheets):   of 33+ years passed away in December 2024 and then patient was diagnosed with cancer next month. Death was unexpected but patient states in hindsight there were \"signs\" and it was his time to go.   STRENGTHS AND LIMITATIONS:  Good support system, independent, active, open

## 2025-05-27 PROBLEM — F43.21 ADJUSTMENT DISORDER WITH DEPRESSED MOOD: Status: ACTIVE | Noted: 2025-05-27

## 2025-05-27 ASSESSMENT — PATIENT HEALTH QUESTIONNAIRE - PHQ9
3. TROUBLE FALLING OR STAYING ASLEEP: NOT AT ALL
9. THOUGHTS THAT YOU WOULD BE BETTER OFF DEAD, OR OF HURTING YOURSELF: NOT AT ALL
7. TROUBLE CONCENTRATING ON THINGS, SUCH AS READING THE NEWSPAPER OR WATCHING TELEVISION: SEVERAL DAYS
8. MOVING OR SPEAKING SO SLOWLY THAT OTHER PEOPLE COULD HAVE NOTICED. OR THE OPPOSITE, BEING SO FIGETY OR RESTLESS THAT YOU HAVE BEEN MOVING AROUND A LOT MORE THAN USUAL: NOT AT ALL
4. FEELING TIRED OR HAVING LITTLE ENERGY: MORE THAN HALF THE DAYS
10. IF YOU CHECKED OFF ANY PROBLEMS, HOW DIFFICULT HAVE THESE PROBLEMS MADE IT FOR YOU TO DO YOUR WORK, TAKE CARE OF THINGS AT HOME, OR GET ALONG WITH OTHER PEOPLE: SOMEWHAT DIFFICULT
SUM OF ALL RESPONSES TO PHQ QUESTIONS 1-9: 9
SUM OF ALL RESPONSES TO PHQ QUESTIONS 1-9: 9
2. FEELING DOWN, DEPRESSED OR HOPELESS: NEARLY EVERY DAY
5. POOR APPETITE OR OVEREATING: MORE THAN HALF THE DAYS
SUM OF ALL RESPONSES TO PHQ QUESTIONS 1-9: 9
1. LITTLE INTEREST OR PLEASURE IN DOING THINGS: SEVERAL DAYS
6. FEELING BAD ABOUT YOURSELF - OR THAT YOU ARE A FAILURE OR HAVE LET YOURSELF OR YOUR FAMILY DOWN: NOT AT ALL
SUM OF ALL RESPONSES TO PHQ QUESTIONS 1-9: 9

## 2025-05-27 ASSESSMENT — ANXIETY QUESTIONNAIRES
1. FEELING NERVOUS, ANXIOUS, OR ON EDGE: SEVERAL DAYS
5. BEING SO RESTLESS THAT IT IS HARD TO SIT STILL: NOT AT ALL
GAD7 TOTAL SCORE: 1
4. TROUBLE RELAXING: NOT AT ALL
2. NOT BEING ABLE TO STOP OR CONTROL WORRYING: NOT AT ALL
IF YOU CHECKED OFF ANY PROBLEMS ON THIS QUESTIONNAIRE, HOW DIFFICULT HAVE THESE PROBLEMS MADE IT FOR YOU TO DO YOUR WORK, TAKE CARE OF THINGS AT HOME, OR GET ALONG WITH OTHER PEOPLE: NOT DIFFICULT AT ALL
3. WORRYING TOO MUCH ABOUT DIFFERENT THINGS: NOT AT ALL
7. FEELING AFRAID AS IF SOMETHING AWFUL MIGHT HAPPEN: NOT AT ALL
6. BECOMING EASILY ANNOYED OR IRRITABLE: NOT AT ALL

## 2025-06-04 ENCOUNTER — OFFICE VISIT (OUTPATIENT)
Age: 70
End: 2025-06-04
Payer: MEDICARE

## 2025-06-04 DIAGNOSIS — F43.21 ADJUSTMENT DISORDER WITH DEPRESSED MOOD: Primary | ICD-10-CM

## 2025-06-04 PROCEDURE — 1036F TOBACCO NON-USER: CPT | Performed by: SOCIAL WORKER

## 2025-06-04 PROCEDURE — 90837 PSYTX W PT 60 MINUTES: CPT | Performed by: SOCIAL WORKER

## 2025-06-04 PROCEDURE — 1123F ACP DISCUSS/DSCN MKR DOCD: CPT | Performed by: SOCIAL WORKER

## 2025-06-04 NOTE — PROGRESS NOTES
remembers her late . Apurva states she is taking time to work on herself and states she is \"allowing things to happen as they happen.\" Patient trying to be patient with her healing process and doesn't want to \"force\" herself into decisions about her future. Patient taking things day by day and allowing herself her typical physical recovery following her last treatment. Apurva discussed how her physical, emotional and spiritual recovery go hand in hand. Patient has many questions for oncologist at next appointment including follow up schedule, symptom length, family member's non cancerous lump.     THERAPEUTIC INTERVENTIONS:  establish rapport discussed potential treatments for depressive symptoms discussed potential barriers to care motivational interviewing encouraged patient to utilize support services at [facility] psychoeducation re: grief discussed self care (sleep, nutrition, rewarding activities, exercise, social support, etc.)    PLAN:   Apurva to continue to utilize established coping skills and be patient with herself as she completes treatment and returns to \"more normal\" activity. Patient to discuss questions with care team at upcoming appointment.     Apurva to identify current coping mechanisms and build upon those skills to better alleviate depressive symptoms. Patient to identify values and meaningful activities that facilitate a purposeful life beyond her grief and treatment.     Were changes or additions made to the treatment plan today?  YES []   NO [x]    SCHEDULED TO RETURN:   to check in at last treatment, return appointment to be determined by patient.

## 2025-06-08 ENCOUNTER — HOSPITAL ENCOUNTER (OUTPATIENT)
Age: 70
Discharge: HOME | End: 2025-06-08
Payer: MEDICARE

## 2025-06-08 DIAGNOSIS — C83.34 DIFFUSE LARGE B-CELL LYMPHOMA OF LYMPH NODES OF AXILLA (HCC): ICD-10-CM

## 2025-06-08 LAB
ALBUMIN SERPL-MCNC: 3.7 G/DL (ref 3.5–5.2)
ALBUMIN/GLOB SERPL: 1.5 {RATIO} (ref 1–2.5)
ALP SERPL-CCNC: 88 U/L (ref 35–104)
ALT SERPL-CCNC: 14 U/L (ref 10–35)
ANION GAP SERPL CALCULATED.3IONS-SCNC: 12 MMOL/L (ref 9–16)
AST SERPL-CCNC: 29 U/L (ref 10–35)
BASOPHILS # BLD: 0.07 K/UL (ref 0–0.2)
BASOPHILS NFR BLD: 1 % (ref 0–2)
BILIRUB SERPL-MCNC: 0.4 MG/DL (ref 0–1.2)
BUN SERPL-MCNC: 12 MG/DL (ref 8–23)
CALCIUM SERPL-MCNC: 8.8 MG/DL (ref 8.6–10.4)
CHLORIDE SERPL-SCNC: 105 MMOL/L (ref 98–107)
CO2 SERPL-SCNC: 25 MMOL/L (ref 20–31)
CREAT SERPL-MCNC: 0.6 MG/DL (ref 0.6–0.9)
EOSINOPHIL # BLD: 0.07 K/UL (ref 0–0.4)
EOSINOPHILS RELATIVE PERCENT: 1 % (ref 1–4)
ERYTHROCYTE [DISTWIDTH] IN BLOOD BY AUTOMATED COUNT: 16.8 % (ref 11.8–14.4)
GFR, ESTIMATED: >90 ML/MIN/1.73M2
GLUCOSE SERPL-MCNC: 191 MG/DL (ref 74–99)
HCT VFR BLD AUTO: 29.5 % (ref 36.3–47.1)
HGB BLD-MCNC: 9.5 G/DL (ref 11.9–15.1)
IMM GRANULOCYTES # BLD AUTO: 0.07 K/UL (ref 0–0.3)
IMM GRANULOCYTES NFR BLD: 1 %
LYMPHOCYTES NFR BLD: 0.51 K/UL (ref 1–4.8)
LYMPHOCYTES RELATIVE PERCENT: 7 % (ref 24–44)
MCH RBC QN AUTO: 31.7 PG (ref 25.2–33.5)
MCHC RBC AUTO-ENTMCNC: 32.2 G/DL (ref 28.4–34.8)
MCV RBC AUTO: 98.3 FL (ref 82.6–102.9)
MONOCYTES NFR BLD: 0.95 K/UL (ref 0.1–0.8)
MONOCYTES NFR BLD: 13 % (ref 1–7)
MORPHOLOGY: ABNORMAL
MORPHOLOGY: ABNORMAL
NEUTROPHILS NFR BLD: 77 % (ref 36–66)
NEUTS SEG NFR BLD: 5.63 K/UL (ref 1.8–7.7)
NRBC BLD-RTO: 0 PER 100 WBC
PLATELET # BLD AUTO: 292 K/UL (ref 138–453)
PMV BLD AUTO: 10.2 FL (ref 8.1–13.5)
POTASSIUM SERPL-SCNC: 4.6 MMOL/L (ref 3.7–5.3)
PROT SERPL-MCNC: 6.1 G/DL (ref 6.6–8.7)
RBC # BLD AUTO: 3 M/UL (ref 3.95–5.11)
SODIUM SERPL-SCNC: 142 MMOL/L (ref 136–145)
WBC OTHER # BLD: 7.3 K/UL (ref 3.5–11.3)

## 2025-06-08 PROCEDURE — 36415 COLL VENOUS BLD VENIPUNCTURE: CPT

## 2025-06-08 PROCEDURE — 85025 COMPLETE CBC W/AUTO DIFF WBC: CPT

## 2025-06-08 PROCEDURE — 80053 COMPREHEN METABOLIC PANEL: CPT

## 2025-06-09 ENCOUNTER — TELEPHONE (OUTPATIENT)
Age: 70
End: 2025-06-09

## 2025-06-09 ENCOUNTER — HOSPITAL ENCOUNTER (OUTPATIENT)
Dept: INFUSION THERAPY | Age: 70
Discharge: HOME | End: 2025-06-09
Payer: MEDICARE

## 2025-06-09 ENCOUNTER — OFFICE VISIT (OUTPATIENT)
Age: 70
End: 2025-06-09
Payer: MEDICARE

## 2025-06-09 ENCOUNTER — CLINICAL DOCUMENTATION (OUTPATIENT)
Age: 70
End: 2025-06-09

## 2025-06-09 VITALS
DIASTOLIC BLOOD PRESSURE: 74 MMHG | SYSTOLIC BLOOD PRESSURE: 130 MMHG | WEIGHT: 143 LBS | BODY MASS INDEX: 24.41 KG/M2 | HEIGHT: 64 IN | HEART RATE: 93 BPM | OXYGEN SATURATION: 98 %

## 2025-06-09 DIAGNOSIS — C83.34 DIFFUSE LARGE B-CELL LYMPHOMA OF LYMPH NODES OF AXILLA (HCC): Primary | ICD-10-CM

## 2025-06-09 PROCEDURE — 6360000002 HC RX W HCPCS: Performed by: INTERNAL MEDICINE

## 2025-06-09 PROCEDURE — 2580000003 HC RX 258: Performed by: INTERNAL MEDICINE

## 2025-06-09 PROCEDURE — 96415 CHEMO IV INFUSION ADDL HR: CPT

## 2025-06-09 PROCEDURE — 99214 OFFICE O/P EST MOD 30 MIN: CPT | Performed by: INTERNAL MEDICINE

## 2025-06-09 PROCEDURE — 3078F DIAST BP <80 MM HG: CPT | Performed by: INTERNAL MEDICINE

## 2025-06-09 PROCEDURE — G8399 PT W/DXA RESULTS DOCUMENT: HCPCS | Performed by: INTERNAL MEDICINE

## 2025-06-09 PROCEDURE — 6370000000 HC RX 637 (ALT 250 FOR IP): Performed by: INTERNAL MEDICINE

## 2025-06-09 PROCEDURE — 96367 TX/PROPH/DG ADDL SEQ IV INF: CPT

## 2025-06-09 PROCEDURE — 1090F PRES/ABSN URINE INCON ASSESS: CPT | Performed by: INTERNAL MEDICINE

## 2025-06-09 PROCEDURE — 1036F TOBACCO NON-USER: CPT | Performed by: INTERNAL MEDICINE

## 2025-06-09 PROCEDURE — G8420 CALC BMI NORM PARAMETERS: HCPCS | Performed by: INTERNAL MEDICINE

## 2025-06-09 PROCEDURE — 96417 CHEMO IV INFUS EACH ADDL SEQ: CPT

## 2025-06-09 PROCEDURE — 96411 CHEMO IV PUSH ADDL DRUG: CPT

## 2025-06-09 PROCEDURE — 3017F COLORECTAL CA SCREEN DOC REV: CPT | Performed by: INTERNAL MEDICINE

## 2025-06-09 PROCEDURE — 1159F MED LIST DOCD IN RCRD: CPT | Performed by: INTERNAL MEDICINE

## 2025-06-09 PROCEDURE — 96413 CHEMO IV INFUSION 1 HR: CPT

## 2025-06-09 PROCEDURE — 1123F ACP DISCUSS/DSCN MKR DOCD: CPT | Performed by: INTERNAL MEDICINE

## 2025-06-09 PROCEDURE — 3075F SYST BP GE 130 - 139MM HG: CPT | Performed by: INTERNAL MEDICINE

## 2025-06-09 PROCEDURE — 96375 TX/PRO/DX INJ NEW DRUG ADDON: CPT

## 2025-06-09 PROCEDURE — G8427 DOCREV CUR MEDS BY ELIG CLIN: HCPCS | Performed by: INTERNAL MEDICINE

## 2025-06-09 RX ORDER — PALONOSETRON 0.05 MG/ML
0.25 INJECTION, SOLUTION INTRAVENOUS ONCE
Status: COMPLETED | OUTPATIENT
Start: 2025-06-09 | End: 2025-06-09

## 2025-06-09 RX ORDER — DIPHENHYDRAMINE HYDROCHLORIDE 50 MG/ML
50 INJECTION, SOLUTION INTRAMUSCULAR; INTRAVENOUS ONCE
Status: COMPLETED | OUTPATIENT
Start: 2025-06-09 | End: 2025-06-09

## 2025-06-09 RX ORDER — SODIUM CHLORIDE 9 MG/ML
5-250 INJECTION, SOLUTION INTRAVENOUS PRN
Status: DISCONTINUED | OUTPATIENT
Start: 2025-06-09 | End: 2025-06-10 | Stop reason: HOSPADM

## 2025-06-09 RX ORDER — ACETAMINOPHEN 325 MG/1
650 TABLET ORAL ONCE
Status: COMPLETED | OUTPATIENT
Start: 2025-06-09 | End: 2025-06-09

## 2025-06-09 RX ORDER — DOXORUBICIN HYDROCHLORIDE 2 MG/ML
50 INJECTION, SOLUTION INTRAVENOUS ONCE
Status: COMPLETED | OUTPATIENT
Start: 2025-06-09 | End: 2025-06-09

## 2025-06-09 RX ADMIN — ACETAMINOPHEN 650 MG: 325 TABLET ORAL at 08:53

## 2025-06-09 RX ADMIN — DOXORUBICIN HYDROCHLORIDE 88 MG: 2 INJECTION, SOLUTION INTRAVENOUS at 11:47

## 2025-06-09 RX ADMIN — DIPHENHYDRAMINE HYDROCHLORIDE 50 MG: 50 INJECTION INTRAMUSCULAR; INTRAVENOUS at 08:54

## 2025-06-09 RX ADMIN — SODIUM CHLORIDE 700 MG: 9 INJECTION, SOLUTION INTRAVENOUS at 10:06

## 2025-06-09 RX ADMIN — VINCRISTINE SULFATE 2 MG: 1 INJECTION, SOLUTION INTRAVENOUS at 11:54

## 2025-06-09 RX ADMIN — SODIUM CHLORIDE 150 MG: 0.9 INJECTION, SOLUTION INTRAVENOUS at 08:58

## 2025-06-09 RX ADMIN — PALONOSETRON 0.25 MG: 0.05 INJECTION, SOLUTION INTRAVENOUS at 08:54

## 2025-06-09 RX ADMIN — CYCLOPHOSPHAMIDE 1320 MG: 200 INJECTION, SOLUTION INTRAVENOUS at 12:06

## 2025-06-09 RX ADMIN — SODIUM CHLORIDE 150 ML/HR: 0.9 INJECTION, SOLUTION INTRAVENOUS at 08:53

## 2025-06-09 NOTE — PROGRESS NOTES
[facility] Oncology Nutrition Note:  Chart reviewed and attempted to meet with patient for brief nutrition follow-up. Pt sound asleep in infusion cubicle. Weight record reviewed: wt is up from last visit. Noted last treatment is today. Writer will attempt to follow-up with patient at a later time/date.

## 2025-06-09 NOTE — PROGRESS NOTES
Pt here for C6D1 RCHOP. Pt seen by Dr Perez prior to tx, refer to his note. Labs drawn yesterday and results reviewed. Pt was treated without incident and d/c'd in stable condition. Pt will return tomorrow 6-10-25 for C6D2 Fulphila.

## 2025-06-09 NOTE — PATIENT INSTRUCTIONS
Proceed with treatment as ordered (labs reviewed). Last treatment today  RV about 3-4 weeks   PET/CT scan before RV

## 2025-06-09 NOTE — PROGRESS NOTES
_  Chief Complaint   Patient presents with    3 week f/u     PORT-cbc,cmp -     DIAGNOSIS:        High grade Diffuse large B-cell NHL. Negative for MYC rearrangement.   Extensive right axillary lymphadenopathy.   CURRENT THERAPY:         Work up in progress  Started R-CHOP 2/24/25  BRIEF CASE HISTORY:      Ms. Apurva Francis is a very pleasant 69 y.o. female with history of multiple co morbidities as listed.  Patient is referred for evaluation and further management of axillary lymphadenopathy.  Patient states she felt a lump in the right axillary area October 2024.  Lump was consistent with a enlarging lymph node.  She had no trauma or skin infections.  Recently she had some discomfort and mild pain in the axillary lymph node area.  She denies any lumps or bumps in the breast.  No skin infections or cellulitis.  No fever or night sweats.  No other areas of lymphadenopathy.  No weight loss or decreased appetite.  The patient had diagnostic mammogram and ultrasound and she was found to have several enlarged lymph nodes in the right axilla with the largest measuring 4.9 cm.  Patient scheduled for lymph node biopsy January 16, 2024.  The previous mammogram from June 2023 was negative.  Patient denies smoking or alcohol drinking.   INTERIM HISTORY:    Seen for follow up NHL. She had axillary LN biopsy.  No palpable lymph nodes.  .  PET/CT scan showed no increased activity  No fever or night sweats. No weight loss.          PAST MEDICAL HISTORY: has a past medical history of Diabetes mellitus (HCC), Fibromyalgia, GERD (gastroesophageal reflux disease), and Lymphoma (HCC).    PAST SURGICAL HISTORY: has a past surgical history that includes Carpal tunnel release; Ulnar tunnel release (Bilateral); Tubal ligation; Finger trigger release (Right, 12/07/2018); Finger trigger release (N/A, 12/7/2018); US BIOPSY LYMPH NODE (N/A, 1/16/2025); and

## 2025-06-10 ENCOUNTER — HOSPITAL ENCOUNTER (OUTPATIENT)
Dept: INFUSION THERAPY | Age: 70
Discharge: HOME | End: 2025-06-10
Payer: MEDICARE

## 2025-06-10 VITALS — TEMPERATURE: 97.9 F

## 2025-06-10 DIAGNOSIS — C83.34 DIFFUSE LARGE B-CELL LYMPHOMA OF LYMPH NODES OF AXILLA (HCC): Primary | ICD-10-CM

## 2025-06-10 PROCEDURE — 96372 THER/PROPH/DIAG INJ SC/IM: CPT

## 2025-06-10 PROCEDURE — 6360000002 HC RX W HCPCS: Performed by: INTERNAL MEDICINE

## 2025-06-10 RX ADMIN — PEGFILGRASTIM-JMDB 6 MG: 6 INJECTION SUBCUTANEOUS at 11:29

## 2025-06-10 NOTE — PROGRESS NOTES
Patient here for C6D2 fulphila.  Arrives ambulatory.  Denies any new complaints.  Treatment complete without incident.  Patient discharged in stable condition.  Returns 7/7/25 for MD visit.

## 2025-06-27 ENCOUNTER — HOSPITAL ENCOUNTER (OUTPATIENT)
Dept: NUCLEAR MEDICINE | Age: 70
Discharge: HOME | End: 2025-06-29
Attending: INTERNAL MEDICINE
Payer: MEDICARE

## 2025-06-27 DIAGNOSIS — C83.34 DIFFUSE LARGE B-CELL LYMPHOMA OF LYMPH NODES OF AXILLA (HCC): ICD-10-CM

## 2025-06-27 LAB — GLUCOSE BLD-MCNC: 142 MG/DL (ref 65–105)

## 2025-06-27 PROCEDURE — 82947 ASSAY GLUCOSE BLOOD QUANT: CPT

## 2025-06-27 PROCEDURE — A9609 HC RX DIAGNOSTIC RADIOPHARMACEUTICAL: HCPCS | Performed by: INTERNAL MEDICINE

## 2025-06-27 PROCEDURE — 78815 PET IMAGE W/CT SKULL-THIGH: CPT

## 2025-06-27 PROCEDURE — 2500000003 HC RX 250 WO HCPCS: Performed by: INTERNAL MEDICINE

## 2025-06-27 PROCEDURE — 3430000000 HC RX DIAGNOSTIC RADIOPHARMACEUTICAL: Performed by: INTERNAL MEDICINE

## 2025-06-27 RX ORDER — SODIUM CHLORIDE 0.9 % (FLUSH) 0.9 %
10 SYRINGE (ML) INJECTION
Status: COMPLETED | OUTPATIENT
Start: 2025-06-27 | End: 2025-06-27

## 2025-06-27 RX ORDER — FLUDEOXYGLUCOSE F 18 200 MCI/ML
10 INJECTION, SOLUTION INTRAVENOUS
Status: COMPLETED | OUTPATIENT
Start: 2025-06-27 | End: 2025-06-27

## 2025-06-27 RX ADMIN — FLUDEOXYGLUCOSE F 18 9.31 MILLICURIE: 200 INJECTION, SOLUTION INTRAVENOUS at 08:00

## 2025-06-27 RX ADMIN — SODIUM CHLORIDE, PRESERVATIVE FREE 10 ML: 5 INJECTION INTRAVENOUS at 08:00

## 2025-07-07 ENCOUNTER — TELEPHONE (OUTPATIENT)
Age: 70
End: 2025-07-07

## 2025-07-07 ENCOUNTER — OFFICE VISIT (OUTPATIENT)
Age: 70
End: 2025-07-07
Payer: MEDICARE

## 2025-07-07 ENCOUNTER — HOSPITAL ENCOUNTER (OUTPATIENT)
Dept: INFUSION THERAPY | Age: 70
Discharge: HOME | End: 2025-07-07
Payer: MEDICARE

## 2025-07-07 VITALS
WEIGHT: 138.2 LBS | DIASTOLIC BLOOD PRESSURE: 84 MMHG | BODY MASS INDEX: 23.72 KG/M2 | OXYGEN SATURATION: 97 % | RESPIRATION RATE: 14 BRPM | TEMPERATURE: 96.7 F | HEART RATE: 83 BPM | SYSTOLIC BLOOD PRESSURE: 138 MMHG

## 2025-07-07 DIAGNOSIS — R59.0 LYMPHADENOPATHY, AXILLARY: ICD-10-CM

## 2025-07-07 DIAGNOSIS — C83.34 DIFFUSE LARGE B-CELL LYMPHOMA OF LYMPH NODES OF AXILLA (HCC): Primary | ICD-10-CM

## 2025-07-07 LAB
ALBUMIN SERPL-MCNC: 4.2 G/DL (ref 3.5–5.2)
ALBUMIN/GLOB SERPL: 1.8 {RATIO} (ref 1–2.5)
ALP SERPL-CCNC: 71 U/L (ref 35–104)
ALT SERPL-CCNC: 19 U/L (ref 10–35)
ANION GAP SERPL CALCULATED.3IONS-SCNC: 10 MMOL/L (ref 9–16)
AST SERPL-CCNC: 43 U/L (ref 10–35)
BASOPHILS # BLD: 0 K/UL (ref 0–0.2)
BASOPHILS NFR BLD: 0 % (ref 0–2)
BILIRUB SERPL-MCNC: 0.5 MG/DL (ref 0–1.2)
BUN SERPL-MCNC: 14 MG/DL (ref 8–23)
CALCIUM SERPL-MCNC: 9.4 MG/DL (ref 8.6–10.4)
CHLORIDE SERPL-SCNC: 104 MMOL/L (ref 98–107)
CO2 SERPL-SCNC: 26 MMOL/L (ref 20–31)
CREAT SERPL-MCNC: 0.7 MG/DL (ref 0.6–0.9)
EOSINOPHIL # BLD: 0.12 K/UL (ref 0–0.4)
EOSINOPHILS RELATIVE PERCENT: 2 % (ref 1–4)
ERYTHROCYTE [DISTWIDTH] IN BLOOD BY AUTOMATED COUNT: 16.3 % (ref 12.5–15.4)
GFR, ESTIMATED: >90 ML/MIN/1.73M2
GLUCOSE SERPL-MCNC: 177 MG/DL (ref 74–99)
HCT VFR BLD AUTO: 33.9 % (ref 36–46)
HGB BLD-MCNC: 11.5 G/DL (ref 12–16)
LYMPHOCYTES NFR BLD: 0.43 K/UL (ref 1–4.8)
LYMPHOCYTES RELATIVE PERCENT: 7 % (ref 24–44)
MCH RBC QN AUTO: 31.1 PG (ref 26–34)
MCHC RBC AUTO-ENTMCNC: 33.7 G/DL (ref 31–37)
MCV RBC AUTO: 92.2 FL (ref 80–100)
MONOCYTES NFR BLD: 0.31 K/UL (ref 0.1–0.8)
MONOCYTES NFR BLD: 5 % (ref 1–7)
MORPHOLOGY: ABNORMAL
NEUTROPHILS NFR BLD: 86 % (ref 36–66)
NEUTS SEG NFR BLD: 5.24 K/UL (ref 1.8–7.7)
PLATELET # BLD AUTO: 310 K/UL (ref 140–450)
PMV BLD AUTO: 7.6 FL (ref 6–12)
POTASSIUM SERPL-SCNC: 4.4 MMOL/L (ref 3.7–5.3)
PROT SERPL-MCNC: 6.6 G/DL (ref 6.6–8.7)
RBC # BLD AUTO: 3.68 M/UL (ref 4–5.2)
SODIUM SERPL-SCNC: 140 MMOL/L (ref 136–145)
WBC OTHER # BLD: 6.1 K/UL (ref 3.5–11)

## 2025-07-07 PROCEDURE — 1090F PRES/ABSN URINE INCON ASSESS: CPT | Performed by: INTERNAL MEDICINE

## 2025-07-07 PROCEDURE — G8399 PT W/DXA RESULTS DOCUMENT: HCPCS | Performed by: INTERNAL MEDICINE

## 2025-07-07 PROCEDURE — 1126F AMNT PAIN NOTED NONE PRSNT: CPT | Performed by: INTERNAL MEDICINE

## 2025-07-07 PROCEDURE — 3075F SYST BP GE 130 - 139MM HG: CPT | Performed by: INTERNAL MEDICINE

## 2025-07-07 PROCEDURE — 36415 COLL VENOUS BLD VENIPUNCTURE: CPT

## 2025-07-07 PROCEDURE — 99214 OFFICE O/P EST MOD 30 MIN: CPT | Performed by: INTERNAL MEDICINE

## 2025-07-07 PROCEDURE — 1123F ACP DISCUSS/DSCN MKR DOCD: CPT | Performed by: INTERNAL MEDICINE

## 2025-07-07 PROCEDURE — G8427 DOCREV CUR MEDS BY ELIG CLIN: HCPCS | Performed by: INTERNAL MEDICINE

## 2025-07-07 PROCEDURE — G8420 CALC BMI NORM PARAMETERS: HCPCS | Performed by: INTERNAL MEDICINE

## 2025-07-07 PROCEDURE — 1036F TOBACCO NON-USER: CPT | Performed by: INTERNAL MEDICINE

## 2025-07-07 PROCEDURE — 85025 COMPLETE CBC W/AUTO DIFF WBC: CPT

## 2025-07-07 PROCEDURE — 3017F COLORECTAL CA SCREEN DOC REV: CPT | Performed by: INTERNAL MEDICINE

## 2025-07-07 PROCEDURE — 80053 COMPREHEN METABOLIC PANEL: CPT

## 2025-07-07 PROCEDURE — 3079F DIAST BP 80-89 MM HG: CPT | Performed by: INTERNAL MEDICINE

## 2025-07-07 RX ORDER — SODIUM CHLORIDE 0.9 % (FLUSH) 0.9 %
5-40 SYRINGE (ML) INJECTION PRN
OUTPATIENT
Start: 2025-07-07

## 2025-07-07 RX ORDER — SODIUM CHLORIDE 9 MG/ML
25 INJECTION, SOLUTION INTRAVENOUS PRN
OUTPATIENT
Start: 2025-07-07

## 2025-07-07 RX ORDER — HEPARIN 100 UNIT/ML
500 SYRINGE INTRAVENOUS PRN
OUTPATIENT
Start: 2025-07-07

## 2025-07-07 RX ORDER — SODIUM CHLORIDE 0.9 % (FLUSH) 0.9 %
5-40 SYRINGE (ML) INJECTION PRN
Status: DISCONTINUED | OUTPATIENT
Start: 2025-07-07 | End: 2025-07-08 | Stop reason: HOSPADM

## 2025-07-07 NOTE — PROGRESS NOTES
Date    WBC 7.3 06/08/2025    HGB 9.5 (L) 06/08/2025    HCT 29.5 (L) 06/08/2025    MCV 98.3 06/08/2025     06/08/2025       Chemistry        Component Value Date/Time     06/08/2025 1038    K 4.6 06/08/2025 1038     06/08/2025 1038    CO2 25 06/08/2025 1038    BUN 12 06/08/2025 1038    CREATININE 0.6 06/08/2025 1038        Component Value Date/Time    CALCIUM 8.8 06/08/2025 1038    ALKPHOS 88 06/08/2025 1038    AST 29 06/08/2025 1038    ALT 14 06/08/2025 1038    BILITOT 0.4 06/08/2025 1038          @[facility]@      IMPRESSION:   High grade Diffuse large B-cell NHL. Negative for MYC rearrangement.   Extensive right axillary lymphadenopathy.      PLAN: Records, labs and images were reviewed and discussed with the patient. I explained to the patient the nature of this problem with axillary lymphadenopathy and possible underlying cause and management plan.    Explained the results of the lymph node biopsy.  Explained the nature of this lymphoma and management plan.  NCCN guidelines reviewed.  Completed 6 cycles of R-CHOP .   Recovering well from surgery.  Explained results of PET/CT scan. Will refer to radiation oncology due to bulky lymphoma and possible residual.  Will continue Norco as needed for pain control.   Patient's questions were answered to the best of her satisfaction and she verbalized full understanding and agreement.                            Finesse Perez MD                          [facility]                            This note is created with the assistance of a speech recognition program.  While intending to generate a document that actually reflects the content of the visit, the document can still have some errors including those of syntax and sound a like substitutions which may escape proof reading.  It such instances, actual meaning can be extrapolated by contextual diversion.

## 2025-07-07 NOTE — TELEPHONE ENCOUNTER
Instructions   from Dr. Finesse Baeza MD    Refer to radiation oncology  RV 2 months     Dr. Means 7-8-2025 @ 10am  RV 9-8-2025 @ 1:45pm  NO AVS printed

## 2025-07-08 ENCOUNTER — CLINICAL DOCUMENTATION (OUTPATIENT)
Age: 70
End: 2025-07-08

## 2025-07-08 ENCOUNTER — HOSPITAL ENCOUNTER (OUTPATIENT)
Dept: RADIATION ONCOLOGY | Age: 70
Discharge: HOME | End: 2025-07-08
Payer: MEDICARE

## 2025-07-08 VITALS
TEMPERATURE: 97.8 F | RESPIRATION RATE: 16 BRPM | SYSTOLIC BLOOD PRESSURE: 131 MMHG | BODY MASS INDEX: 24.51 KG/M2 | DIASTOLIC BLOOD PRESSURE: 89 MMHG | HEART RATE: 82 BPM | WEIGHT: 142.8 LBS | OXYGEN SATURATION: 97 %

## 2025-07-08 PROCEDURE — 99213 OFFICE O/P EST LOW 20 MIN: CPT | Performed by: RADIOLOGY

## 2025-07-08 NOTE — PROGRESS NOTES
[facility]- MEDICAL NUTRITION THERAPY     Visit Type: follow-up     NUTRITION RECOMMENDATIONS / MONITORING / EVALUATION  Encouraged a well-balanced, nutrient dense diet as tolerated.   Will periodically f/u with pt re: PO tolerance, adequacy of intake, weight, and care plans.     Subjective/Current Data:  Apurva Francis is a 69 y.o. female with h/o high grade Diffuse large B-cell NHL, on chemotherapy.   Pt reports taste, appetite, and intake has improved since being off chemotherapy, but states she lost weight despite this. Does not use any type of oral nutrition supplement in addition to meals; prefers not to use commercial shakes. Pt states she is planning to start following a meal plan by Dr Connelly, which is intended for cancer patient's/cancer prevention.      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 Pt states her weight is 138 lb 3.2 oz (62.7 kg)   Ideal Body Weight: 120 lb (54.5 kg)  BMI: 24.51 kg/m2 (Normal Weight)     Wt Readings from Last 20 Encounters:   07/08/25 64.8 kg (142 lb 12.8 oz)   07/07/25 62.7 kg (138 lb 3.2 oz)   06/09/25 64.9 kg (143 lb)   05/19/25 62.2 kg (137 lb 3.2 oz)   05/09/25 63.5 kg (140 lb)   04/28/25 64.6 kg (142 lb 6.4 oz)   04/14/25 66.7 kg (147 lb)

## 2025-07-08 NOTE — PROGRESS NOTES
Referring Physician: Dr. Perez      Vitals:    07/08/25 1002   BP: 131/89   Pulse: 82   Resp: 16   Temp: 97.8 °F (36.6 °C)   SpO2: 97%    :     Pain Assessment: None - Denies Pain          Wt Readings from Last 1 Encounters:   07/08/25 64.8 kg (142 lb 12.8 oz)        Body mass index is 24.51 kg/m².              Smoking Status:    [] Smoker - PPD:   [x] Nonsmoker - Quit Date: 2005              [] Never a smoker      No chief complaint on file.       Cancer Staging   No matching staging information was found for the patient.      Prior Radiation Therapy? No   If yes, site treated:   Facility:                             Date:    Concurrent Chemo/radiation? No   If yes, start date:    Prior Hormonal Therapy or Contraceptive Medications?  No   If yes,  Medication:                                Duration:    Head and Neck Cancer? No     Pacemaker/Defibrillator/ICD:  No    Do you have any musculoskeletal diseases, Lupus or arthritic conditions?  No   If yes, are you currently taking Methotrexate?  []  Yes  [x]  No             Current Outpatient Medications   Medication Sig Dispense Refill    predniSONE (DELTASONE) 50 MG tablet Take 1 tablet by mouth 2 times daily For 5 days repeat every 3 weeks (Patient not taking: Reported on 7/8/2025) 20 tablet 0    allopurinol (ZYLOPRIM) 300 MG tablet Take 1 tablet by mouth daily (Patient not taking: Reported on 5/19/2025) 30 tablet 0    ibuprofen (ADVIL;MOTRIN) 200 MG tablet Take 1 tablet by mouth every 6 hours as needed for Pain      lidocaine-prilocaine (EMLA) 2.5-2.5 % cream Apply topically to port site 45-60 minutes prior to needle poke as needed. 1 each 2    prochlorperazine (COMPAZINE) 10 MG tablet Take 1 tablet by mouth every 6 hours as needed (for nausea or vomiting) (Patient not taking: Reported on 7/8/2025) 120 tablet 3    ondansetron (ZOFRAN-ODT) 8 MG TBDP disintegrating tablet Take 1 tablet by mouth every 8 hours as needed for Nausea or Vomiting 90 tablet 3

## 2025-07-29 ENCOUNTER — HOSPITAL ENCOUNTER (OUTPATIENT)
Dept: RADIATION ONCOLOGY | Age: 70
Discharge: HOME | End: 2025-07-29
Payer: MEDICARE

## 2025-07-29 ENCOUNTER — TELEPHONE (OUTPATIENT)
Age: 70
End: 2025-07-29

## 2025-07-29 PROCEDURE — 77334 RADIATION TREATMENT AID(S): CPT | Performed by: RADIOLOGY

## 2025-07-29 PROCEDURE — 77290 THER RAD SIMULAJ FIELD CPLX: CPT | Performed by: RADIOLOGY

## 2025-07-29 RX ORDER — BETAMETHASONE VALERATE 1.2 MG/G
CREAM TOPICAL
Qty: 45 G | Refills: 1 | Status: SHIPPED | OUTPATIENT
Start: 2025-07-29

## 2025-07-29 NOTE — PROGRESS NOTES
Radiation Treatment Site/Plan/Fractions:  Right axilla/LN - 15 daily treatments      Concurrent Chemotherapy/Immunotherapy:  Chemo completed in June      Cardiac Device:  N/A      Transportation Concerns:  None      Nursing Referrals and Reasons:  None currently      Contrast Given:  None          Miscellaneous Information:  Pt ambulatory on arrival to the teach/SIM appointment and is accompanied by her friend Erin.  Pt stating she is a little nervous about having treatment, emotional support provided.  What to expect as well as side effects reviewed.  Pt and friend verbalized understanding.  Pt instructed to moisturize twice daily and betamethasone was sent to the patient's pharmacy.   Pt denies any issues at this time for housing, transportation or finances.  Will continue plan of care.

## 2025-07-29 NOTE — DISCHARGE INSTRUCTIONS
What to expect next!   Simulation Scan      Prior to your radiation you will need a simulation CT scan. This scan is where they will precisely identify the area on your body where you will receive radiation. Positioning is extremely important, and your body will be positioned carefully. You will be in the same position during every treatment, and you will need to remain still during the treatments. Your doctor may order a mold or a mask (for head and neck treatment only) to help reproduce your treatment set up. You will also receive tattoos during this appointment. These tattoos are very important. The therapists use these tattoos to line your body up on the treatment table. Information from the CT scan is used to precisely locate the treatment fields and create a \"map\" for the physician to design the treatment. The CT scanner is specially designed to work with the other equipment in the department and is not a replacement for other diagnostic scans you may have received.   After simulation, details from the procedure are forwarded to medical radiation dosimetrists and medical physicists. These professionals perform highly technical calculations that will be used to set up the treatment machine (linear accelerator). The dosimetrist and physicist work closely with your radiation oncologist to develop the treatment plan, a process that typically takes 7-10 business days. Once the planning is completed, a therapist will call you with a start date. During that call your appointment time will also be determined.                Body Mold               Radiation Tattoo    Daily Treatments       You will be placed on the treatment table in the same position as you were when you had your simulation scan. Once in place, a set of X-ray films will be taken to ensure that the treatment will be delivered the same way as it was simulated. Once the films and positioning are confirmed, a treatment will be delivered. Factors that

## 2025-08-01 ENCOUNTER — HOSPITAL ENCOUNTER (OUTPATIENT)
Dept: RADIATION ONCOLOGY | Age: 70
Discharge: HOME | End: 2025-08-01
Payer: MEDICARE

## 2025-08-01 PROCEDURE — 77295 3-D RADIOTHERAPY PLAN: CPT | Performed by: RADIOLOGY

## 2025-08-01 PROCEDURE — 77300 RADIATION THERAPY DOSE PLAN: CPT | Performed by: RADIOLOGY

## 2025-08-01 PROCEDURE — 77334 RADIATION TREATMENT AID(S): CPT | Performed by: RADIOLOGY

## 2025-08-13 ENCOUNTER — HOSPITAL ENCOUNTER (OUTPATIENT)
Dept: RADIATION ONCOLOGY | Age: 70
Discharge: HOME | End: 2025-08-13
Payer: MEDICARE

## 2025-08-13 VITALS
RESPIRATION RATE: 16 BRPM | WEIGHT: 143.6 LBS | OXYGEN SATURATION: 95 % | TEMPERATURE: 96.9 F | HEART RATE: 86 BPM | SYSTOLIC BLOOD PRESSURE: 163 MMHG | DIASTOLIC BLOOD PRESSURE: 82 MMHG | BODY MASS INDEX: 24.65 KG/M2

## 2025-08-13 PROCEDURE — 77412 RADIATION TX DELIVERY LVL 3: CPT | Performed by: RADIOLOGY

## 2025-08-13 PROCEDURE — 77336 RADIATION PHYSICS CONSULT: CPT | Performed by: RADIOLOGY

## 2025-08-13 PROCEDURE — 77280 THER RAD SIMULAJ FIELD SMPL: CPT | Performed by: RADIOLOGY

## 2025-08-14 ENCOUNTER — HOSPITAL ENCOUNTER (OUTPATIENT)
Dept: RADIATION ONCOLOGY | Age: 70
Discharge: HOME | End: 2025-08-14
Payer: MEDICARE

## 2025-08-14 ENCOUNTER — TELEPHONE (OUTPATIENT)
Age: 70
End: 2025-08-14

## 2025-08-14 DIAGNOSIS — E11.9 TYPE 2 DIABETES MELLITUS WITHOUT COMPLICATION, WITHOUT LONG-TERM CURRENT USE OF INSULIN (HCC): Primary | ICD-10-CM

## 2025-08-14 PROCEDURE — 77412 RADIATION TX DELIVERY LVL 3: CPT | Performed by: RADIOLOGY

## 2025-08-14 RX ORDER — HYDROCHLOROTHIAZIDE 12.5 MG/1
1 CAPSULE ORAL
Qty: 9 EACH | Refills: 3 | Status: SHIPPED | OUTPATIENT
Start: 2025-08-14

## 2025-08-15 ENCOUNTER — HOSPITAL ENCOUNTER (OUTPATIENT)
Dept: RADIATION ONCOLOGY | Age: 70
Discharge: HOME | End: 2025-08-15
Payer: MEDICARE

## 2025-08-15 PROCEDURE — 77412 RADIATION TX DELIVERY LVL 3: CPT | Performed by: RADIOLOGY

## 2025-08-18 ENCOUNTER — HOSPITAL ENCOUNTER (OUTPATIENT)
Age: 70
Setting detail: SPECIMEN
Discharge: HOME | End: 2025-08-18

## 2025-08-18 ENCOUNTER — HOSPITAL ENCOUNTER (OUTPATIENT)
Dept: RADIATION ONCOLOGY | Age: 70
Discharge: HOME | End: 2025-08-18
Payer: MEDICARE

## 2025-08-18 ENCOUNTER — HOSPITAL ENCOUNTER (OUTPATIENT)
Dept: INFUSION THERAPY | Age: 70
Discharge: HOME | End: 2025-08-18
Payer: MEDICARE

## 2025-08-18 ENCOUNTER — OFFICE VISIT (OUTPATIENT)
Dept: PRIMARY CARE CLINIC | Age: 70
End: 2025-08-18

## 2025-08-18 VITALS
SYSTOLIC BLOOD PRESSURE: 120 MMHG | DIASTOLIC BLOOD PRESSURE: 68 MMHG | HEART RATE: 89 BPM | WEIGHT: 142 LBS | RESPIRATION RATE: 16 BRPM | OXYGEN SATURATION: 98 % | BODY MASS INDEX: 24.37 KG/M2

## 2025-08-18 DIAGNOSIS — E78.5 HYPERLIPIDEMIA, UNSPECIFIED HYPERLIPIDEMIA TYPE: ICD-10-CM

## 2025-08-18 DIAGNOSIS — C83.34 DIFFUSE LARGE B-CELL LYMPHOMA OF LYMPH NODES OF AXILLA (HCC): Primary | ICD-10-CM

## 2025-08-18 DIAGNOSIS — E55.9 VITAMIN D DEFICIENCY: ICD-10-CM

## 2025-08-18 DIAGNOSIS — C83.34 DIFFUSE LARGE B-CELL LYMPHOMA OF LYMPH NODES OF AXILLA (HCC): ICD-10-CM

## 2025-08-18 DIAGNOSIS — E11.9 TYPE 2 DIABETES MELLITUS WITHOUT COMPLICATION, WITHOUT LONG-TERM CURRENT USE OF INSULIN (HCC): Primary | ICD-10-CM

## 2025-08-18 DIAGNOSIS — E53.8 VITAMIN B12 DEFICIENCY: ICD-10-CM

## 2025-08-18 LAB
FOLATE SERPL-MCNC: 8.4 NG/ML (ref 4.8–24.2)
HBA1C MFR BLD: 5.3 %
VIT B12 SERPL-MCNC: 353 PG/ML (ref 232–1245)

## 2025-08-18 PROCEDURE — 2500000003 HC RX 250 WO HCPCS: Performed by: INTERNAL MEDICINE

## 2025-08-18 PROCEDURE — 6360000002 HC RX W HCPCS: Performed by: INTERNAL MEDICINE

## 2025-08-18 PROCEDURE — 96523 IRRIG DRUG DELIVERY DEVICE: CPT

## 2025-08-18 PROCEDURE — 77412 RADIATION TX DELIVERY LVL 3: CPT | Performed by: RADIOLOGY

## 2025-08-18 RX ORDER — SODIUM CHLORIDE 0.9 % (FLUSH) 0.9 %
5-40 SYRINGE (ML) INJECTION PRN
Status: DISCONTINUED | OUTPATIENT
Start: 2025-08-18 | End: 2025-08-19 | Stop reason: HOSPADM

## 2025-08-18 RX ORDER — HEPARIN 100 UNIT/ML
500 SYRINGE INTRAVENOUS PRN
OUTPATIENT
Start: 2025-08-18

## 2025-08-18 RX ORDER — HEPARIN 100 UNIT/ML
500 SYRINGE INTRAVENOUS PRN
Status: DISCONTINUED | OUTPATIENT
Start: 2025-08-18 | End: 2025-08-19 | Stop reason: HOSPADM

## 2025-08-18 RX ORDER — SODIUM CHLORIDE 0.9 % (FLUSH) 0.9 %
5-40 SYRINGE (ML) INJECTION PRN
OUTPATIENT
Start: 2025-08-18

## 2025-08-18 RX ORDER — SODIUM CHLORIDE 9 MG/ML
25 INJECTION, SOLUTION INTRAVENOUS PRN
OUTPATIENT
Start: 2025-08-18

## 2025-08-18 RX ADMIN — SODIUM CHLORIDE, PRESERVATIVE FREE 10 ML: 5 INJECTION INTRAVENOUS at 14:40

## 2025-08-18 RX ADMIN — HEPARIN 500 UNITS: 100 SYRINGE at 14:40

## 2025-08-18 SDOH — ECONOMIC STABILITY: FOOD INSECURITY: WITHIN THE PAST 12 MONTHS, THE FOOD YOU BOUGHT JUST DIDN'T LAST AND YOU DIDN'T HAVE MONEY TO GET MORE.: NEVER TRUE

## 2025-08-18 SDOH — ECONOMIC STABILITY: FOOD INSECURITY: WITHIN THE PAST 12 MONTHS, YOU WORRIED THAT YOUR FOOD WOULD RUN OUT BEFORE YOU GOT MONEY TO BUY MORE.: NEVER TRUE

## 2025-08-18 ASSESSMENT — PATIENT HEALTH QUESTIONNAIRE - PHQ9
2. FEELING DOWN, DEPRESSED OR HOPELESS: NOT AT ALL
SUM OF ALL RESPONSES TO PHQ QUESTIONS 1-9: 0
1. LITTLE INTEREST OR PLEASURE IN DOING THINGS: NOT AT ALL
SUM OF ALL RESPONSES TO PHQ QUESTIONS 1-9: 0

## 2025-08-18 ASSESSMENT — ENCOUNTER SYMPTOMS
BACK PAIN: 0
ABDOMINAL PAIN: 0
COUGH: 0
SHORTNESS OF BREATH: 0

## 2025-08-19 ENCOUNTER — HOSPITAL ENCOUNTER (OUTPATIENT)
Dept: RADIATION ONCOLOGY | Age: 70
Discharge: HOME | End: 2025-08-19
Payer: MEDICARE

## 2025-08-19 PROCEDURE — 77412 RADIATION TX DELIVERY LVL 3: CPT | Performed by: RADIOLOGY

## 2025-08-20 ENCOUNTER — HOSPITAL ENCOUNTER (OUTPATIENT)
Dept: RADIATION ONCOLOGY | Age: 70
Discharge: HOME | End: 2025-08-20
Payer: MEDICARE

## 2025-08-20 ENCOUNTER — TELEPHONE (OUTPATIENT)
Age: 70
End: 2025-08-20

## 2025-08-20 ENCOUNTER — CLINICAL DOCUMENTATION (OUTPATIENT)
Age: 70
End: 2025-08-20

## 2025-08-20 VITALS
WEIGHT: 139.9 LBS | HEART RATE: 89 BPM | TEMPERATURE: 97.8 F | DIASTOLIC BLOOD PRESSURE: 81 MMHG | BODY MASS INDEX: 24.01 KG/M2 | SYSTOLIC BLOOD PRESSURE: 147 MMHG | OXYGEN SATURATION: 97 % | RESPIRATION RATE: 16 BRPM

## 2025-08-20 PROCEDURE — 77412 RADIATION TX DELIVERY LVL 3: CPT | Performed by: RADIOLOGY

## 2025-08-20 PROCEDURE — 77336 RADIATION PHYSICS CONSULT: CPT | Performed by: RADIOLOGY

## 2025-08-20 ASSESSMENT — PAIN SCALES - GENERAL: PAINLEVEL_OUTOF10: 0

## 2025-08-21 ENCOUNTER — HOSPITAL ENCOUNTER (OUTPATIENT)
Dept: RADIATION ONCOLOGY | Age: 70
Discharge: HOME | End: 2025-08-21
Payer: MEDICARE

## 2025-08-21 PROCEDURE — 77412 RADIATION TX DELIVERY LVL 3: CPT | Performed by: RADIOLOGY

## 2025-08-22 ENCOUNTER — HOSPITAL ENCOUNTER (OUTPATIENT)
Dept: RADIATION ONCOLOGY | Age: 70
Discharge: HOME | End: 2025-08-22
Payer: MEDICARE

## 2025-08-22 PROCEDURE — 77412 RADIATION TX DELIVERY LVL 3: CPT | Performed by: RADIOLOGY

## 2025-08-25 ENCOUNTER — HOSPITAL ENCOUNTER (OUTPATIENT)
Dept: RADIATION ONCOLOGY | Age: 70
Discharge: HOME | End: 2025-08-25
Payer: MEDICARE

## 2025-08-25 PROCEDURE — 77412 RADIATION TX DELIVERY LVL 3: CPT | Performed by: RADIOLOGY

## 2025-08-26 ENCOUNTER — HOSPITAL ENCOUNTER (OUTPATIENT)
Dept: RADIATION ONCOLOGY | Age: 70
Discharge: HOME | End: 2025-08-26
Payer: MEDICARE

## 2025-08-26 PROCEDURE — 77412 RADIATION TX DELIVERY LVL 3: CPT | Performed by: RADIOLOGY

## 2025-08-27 ENCOUNTER — HOSPITAL ENCOUNTER (OUTPATIENT)
Dept: RADIATION ONCOLOGY | Age: 70
Discharge: HOME | End: 2025-08-27
Payer: MEDICARE

## 2025-08-27 VITALS
DIASTOLIC BLOOD PRESSURE: 83 MMHG | BODY MASS INDEX: 24.36 KG/M2 | RESPIRATION RATE: 16 BRPM | HEART RATE: 74 BPM | OXYGEN SATURATION: 97 % | SYSTOLIC BLOOD PRESSURE: 128 MMHG | WEIGHT: 141.9 LBS | TEMPERATURE: 97.1 F

## 2025-08-27 PROCEDURE — 77412 RADIATION TX DELIVERY LVL 3: CPT | Performed by: RADIOLOGY

## 2025-08-27 PROCEDURE — 77336 RADIATION PHYSICS CONSULT: CPT | Performed by: RADIOLOGY

## 2025-08-27 ASSESSMENT — PAIN SCALES - GENERAL: PAINLEVEL_OUTOF10: 0

## 2025-08-28 ENCOUNTER — HOSPITAL ENCOUNTER (OUTPATIENT)
Dept: RADIATION ONCOLOGY | Age: 70
Discharge: HOME | End: 2025-08-28
Payer: MEDICARE

## 2025-08-28 PROCEDURE — 77412 RADIATION TX DELIVERY LVL 3: CPT | Performed by: RADIOLOGY

## 2025-08-29 ENCOUNTER — HOSPITAL ENCOUNTER (OUTPATIENT)
Dept: RADIATION ONCOLOGY | Age: 70
Discharge: HOME | End: 2025-08-29
Payer: MEDICARE

## 2025-08-29 PROCEDURE — 77412 RADIATION TX DELIVERY LVL 3: CPT | Performed by: RADIOLOGY

## 2025-09-02 ENCOUNTER — HOSPITAL ENCOUNTER (OUTPATIENT)
Dept: RADIATION ONCOLOGY | Age: 70
Discharge: HOME | End: 2025-09-02
Payer: MEDICARE

## 2025-09-02 PROCEDURE — 77412 RADIATION TX DELIVERY LVL 3: CPT | Performed by: RADIOLOGY

## 2025-09-03 ENCOUNTER — HOSPITAL ENCOUNTER (OUTPATIENT)
Dept: RADIATION ONCOLOGY | Age: 70
Discharge: HOME | End: 2025-09-03
Payer: MEDICARE

## 2025-09-03 VITALS
TEMPERATURE: 98 F | HEART RATE: 86 BPM | OXYGEN SATURATION: 97 % | SYSTOLIC BLOOD PRESSURE: 125 MMHG | RESPIRATION RATE: 16 BRPM | DIASTOLIC BLOOD PRESSURE: 82 MMHG | BODY MASS INDEX: 25.25 KG/M2 | WEIGHT: 147.1 LBS

## 2025-09-03 PROCEDURE — 77412 RADIATION TX DELIVERY LVL 3: CPT | Performed by: RADIOLOGY
